# Patient Record
Sex: FEMALE | Race: WHITE | Employment: FULL TIME | URBAN - METROPOLITAN AREA
[De-identification: names, ages, dates, MRNs, and addresses within clinical notes are randomized per-mention and may not be internally consistent; named-entity substitution may affect disease eponyms.]

---

## 2018-07-11 ENCOUNTER — HOSPITAL ENCOUNTER (OUTPATIENT)
Dept: MRI IMAGING | Age: 58
Discharge: HOME | End: 2018-07-11
Attending: INTERNAL MEDICINE
Payer: COMMERCIAL

## 2018-07-11 ENCOUNTER — HOSPITAL ENCOUNTER (OUTPATIENT)
Dept: ULTRASOUND IMAGING | Age: 58
Discharge: HOME | End: 2018-07-11
Attending: INTERNAL MEDICINE
Payer: COMMERCIAL

## 2018-07-11 DIAGNOSIS — M48.00 SPINAL STENOSIS: ICD-10-CM

## 2018-07-11 DIAGNOSIS — R15.9 URINARY AND FECAL INCONTINENCE: ICD-10-CM

## 2018-07-11 DIAGNOSIS — R74.8 ELEVATED LIVER ENZYMES: ICD-10-CM

## 2018-07-11 DIAGNOSIS — R32 URINARY AND FECAL INCONTINENCE: ICD-10-CM

## 2018-07-11 DIAGNOSIS — R79.89 ABNORMAL LIVER FUNCTION TEST: ICD-10-CM

## 2018-07-11 PROCEDURE — 74011250636 HC RX REV CODE- 250/636

## 2018-07-11 PROCEDURE — A9575 INJ GADOTERATE MEGLUMI 0.1ML: HCPCS

## 2018-07-11 PROCEDURE — 76705 ECHO EXAM OF ABDOMEN: CPT

## 2018-07-11 PROCEDURE — 72158 MRI LUMBAR SPINE W/O & W/DYE: CPT

## 2018-07-11 RX ORDER — GADOTERATE MEGLUMINE 376.9 MG/ML
20 INJECTION INTRAVENOUS
Status: COMPLETED | OUTPATIENT
Start: 2018-07-11 | End: 2018-07-11

## 2018-07-11 RX ADMIN — GADOTERATE MEGLUMINE 16 ML: 376.9 INJECTION INTRAVENOUS at 19:45

## 2018-08-03 ENCOUNTER — HOSPITAL ENCOUNTER (OUTPATIENT)
Dept: MAMMOGRAPHY | Age: 58
Discharge: HOME | End: 2018-08-03
Attending: INTERNAL MEDICINE
Payer: COMMERCIAL

## 2018-08-03 DIAGNOSIS — Z12.31 VISIT FOR SCREENING MAMMOGRAM: ICD-10-CM

## 2018-08-03 PROCEDURE — 77063 BREAST TOMOSYNTHESIS BI: CPT

## 2018-09-07 ENCOUNTER — HOSPITAL ENCOUNTER (OUTPATIENT)
Dept: PREADMISSION TESTING | Age: 58
Discharge: HOME | End: 2018-09-07
Payer: COMMERCIAL

## 2018-09-07 VITALS
OXYGEN SATURATION: 98 % | HEART RATE: 80 BPM | BODY MASS INDEX: 26.56 KG/M2 | WEIGHT: 159.4 LBS | SYSTOLIC BLOOD PRESSURE: 124 MMHG | HEIGHT: 65 IN | TEMPERATURE: 98.3 F | RESPIRATION RATE: 16 BRPM | DIASTOLIC BLOOD PRESSURE: 76 MMHG

## 2018-09-07 LAB
ANION GAP SERPL CALC-SCNC: 12 MMOL/L (ref 5–15)
BASOPHILS # BLD: 0.1 K/UL (ref 0–0.1)
BASOPHILS NFR BLD: 1 % (ref 0–1)
BUN SERPL-MCNC: 10 MG/DL (ref 6–20)
BUN/CREAT SERPL: 13 (ref 12–20)
CALCIUM SERPL-MCNC: 8.8 MG/DL (ref 8.5–10.1)
CHLORIDE SERPL-SCNC: 99 MMOL/L (ref 97–108)
CO2 SERPL-SCNC: 30 MMOL/L (ref 21–32)
CREAT SERPL-MCNC: 0.77 MG/DL (ref 0.55–1.02)
DIFFERENTIAL METHOD BLD: ABNORMAL
EOSINOPHIL # BLD: 0.2 K/UL (ref 0–0.4)
EOSINOPHIL NFR BLD: 3 % (ref 0–7)
ERYTHROCYTE [DISTWIDTH] IN BLOOD BY AUTOMATED COUNT: 13.4 % (ref 11.5–14.5)
GLUCOSE SERPL-MCNC: 93 MG/DL (ref 65–100)
HCT VFR BLD AUTO: 40.6 % (ref 35–47)
HGB BLD-MCNC: 13.6 G/DL (ref 11.5–16)
IMM GRANULOCYTES # BLD: 0.1 K/UL (ref 0–0.04)
IMM GRANULOCYTES NFR BLD AUTO: 1 % (ref 0–0.5)
LYMPHOCYTES # BLD: 2.1 K/UL (ref 0.8–3.5)
LYMPHOCYTES NFR BLD: 27 % (ref 12–49)
MCH RBC QN AUTO: 29.8 PG (ref 26–34)
MCHC RBC AUTO-ENTMCNC: 33.5 G/DL (ref 30–36.5)
MCV RBC AUTO: 88.8 FL (ref 80–99)
MONOCYTES # BLD: 0.6 K/UL (ref 0–1)
MONOCYTES NFR BLD: 8 % (ref 5–13)
NEUTS SEG # BLD: 4.9 K/UL (ref 1.8–8)
NEUTS SEG NFR BLD: 61 % (ref 32–75)
NRBC # BLD: 0 K/UL (ref 0–0.01)
NRBC BLD-RTO: 0 PER 100 WBC
PLATELET # BLD AUTO: 354 K/UL (ref 150–400)
PMV BLD AUTO: 9.7 FL (ref 8.9–12.9)
POTASSIUM SERPL-SCNC: 3.7 MMOL/L (ref 3.5–5.1)
RBC # BLD AUTO: 4.57 M/UL (ref 3.8–5.2)
SODIUM SERPL-SCNC: 141 MMOL/L (ref 136–145)
WBC # BLD AUTO: 8 K/UL (ref 3.6–11)

## 2018-09-07 PROCEDURE — 80048 BASIC METABOLIC PNL TOTAL CA: CPT | Performed by: NEUROLOGICAL SURGERY

## 2018-09-07 PROCEDURE — 36415 COLL VENOUS BLD VENIPUNCTURE: CPT | Performed by: NEUROLOGICAL SURGERY

## 2018-09-07 PROCEDURE — 85025 COMPLETE CBC W/AUTO DIFF WBC: CPT | Performed by: NEUROLOGICAL SURGERY

## 2018-09-07 PROCEDURE — 93005 ELECTROCARDIOGRAM TRACING: CPT

## 2018-09-07 RX ORDER — NAPROXEN 500 MG/1
500 TABLET ORAL 2 TIMES DAILY WITH MEALS
COMMUNITY
End: 2018-09-22

## 2018-09-07 RX ORDER — LEVOTHYROXINE SODIUM 50 UG/1
50 TABLET ORAL
COMMUNITY

## 2018-09-07 RX ORDER — TRAMADOL HYDROCHLORIDE 300 MG/1
1 TABLET, FILM COATED, EXTENDED RELEASE ORAL
COMMUNITY
End: 2018-09-22

## 2018-09-07 RX ORDER — VENLAFAXINE HYDROCHLORIDE 150 MG/1
150 TABLET, EXTENDED RELEASE ORAL DAILY
COMMUNITY
End: 2019-05-28

## 2018-09-07 RX ORDER — LOSARTAN POTASSIUM AND HYDROCHLOROTHIAZIDE 25; 100 MG/1; MG/1
1 TABLET ORAL DAILY
COMMUNITY

## 2018-09-07 RX ORDER — ZOLPIDEM TARTRATE 12.5 MG/1
12.5 TABLET, FILM COATED, EXTENDED RELEASE ORAL
COMMUNITY

## 2018-09-07 RX ORDER — POTASSIUM CHLORIDE 750 MG/1
10 TABLET, FILM COATED, EXTENDED RELEASE ORAL
COMMUNITY
End: 2018-09-27 | Stop reason: SDUPTHER

## 2018-09-07 RX ORDER — ZAFIRLUKAST 20 MG/1
20 TABLET, FILM COATED ORAL 2 TIMES DAILY
COMMUNITY

## 2018-09-07 NOTE — PERIOP NOTES
PATIENT GIVEN SURGICAL SITE INFECTION FAQ HANDOUT. PRE-OP INSTRUCTIONS REVIEWED AND PATIENT VERBALIZES UNDERSTANDING OF INSTRUCTIONS. PATIENT HAS BEEN GIVEN THE OPPORTUNITY TO ASK ADDITIONAL QUESTIONS.

## 2018-09-08 LAB
ATRIAL RATE: 72 BPM
BACTERIA SPEC CULT: NORMAL
BACTERIA SPEC CULT: NORMAL
CALCULATED P AXIS, ECG09: 57 DEGREES
CALCULATED R AXIS, ECG10: 52 DEGREES
CALCULATED T AXIS, ECG11: 85 DEGREES
DIAGNOSIS, 93000: NORMAL
P-R INTERVAL, ECG05: 160 MS
Q-T INTERVAL, ECG07: 398 MS
QRS DURATION, ECG06: 92 MS
QTC CALCULATION (BEZET), ECG08: 435 MS
SERVICE CMNT-IMP: NORMAL
VENTRICULAR RATE, ECG03: 72 BPM

## 2018-09-17 ENCOUNTER — ANESTHESIA EVENT (OUTPATIENT)
Dept: SURGERY | Age: 58
DRG: 460 | End: 2018-09-17
Payer: COMMERCIAL

## 2018-09-18 ENCOUNTER — HOSPITAL ENCOUNTER (INPATIENT)
Age: 58
LOS: 4 days | Discharge: HOME HEALTH | DRG: 460 | End: 2018-09-22
Attending: NEUROLOGICAL SURGERY | Admitting: NEUROLOGICAL SURGERY
Payer: COMMERCIAL

## 2018-09-18 ENCOUNTER — ANESTHESIA (OUTPATIENT)
Dept: SURGERY | Age: 58
DRG: 460 | End: 2018-09-18
Payer: COMMERCIAL

## 2018-09-18 ENCOUNTER — APPOINTMENT (OUTPATIENT)
Dept: GENERAL RADIOLOGY | Age: 58
DRG: 460 | End: 2018-09-18
Attending: NEUROLOGICAL SURGERY
Payer: COMMERCIAL

## 2018-09-18 DIAGNOSIS — M48.062 LUMBAR STENOSIS WITH NEUROGENIC CLAUDICATION: Primary | ICD-10-CM

## 2018-09-18 LAB
GLUCOSE BLD STRIP.AUTO-MCNC: 109 MG/DL (ref 65–100)
SERVICE CMNT-IMP: ABNORMAL

## 2018-09-18 PROCEDURE — 77030003029 HC SUT VCRL J&J -B: Performed by: NEUROLOGICAL SURGERY

## 2018-09-18 PROCEDURE — 77030002933 HC SUT MCRYL J&J -A: Performed by: NEUROLOGICAL SURGERY

## 2018-09-18 PROCEDURE — 74011000272 HC RX REV CODE- 272: Performed by: NEUROLOGICAL SURGERY

## 2018-09-18 PROCEDURE — C1713 ANCHOR/SCREW BN/BN,TIS/BN: HCPCS | Performed by: NEUROLOGICAL SURGERY

## 2018-09-18 PROCEDURE — 74011250636 HC RX REV CODE- 250/636

## 2018-09-18 PROCEDURE — 74011250637 HC RX REV CODE- 250/637: Performed by: NEUROLOGICAL SURGERY

## 2018-09-18 PROCEDURE — 77030013079 HC BLNKT BAIR HGGR 3M -A: Performed by: NURSE ANESTHETIST, CERTIFIED REGISTERED

## 2018-09-18 PROCEDURE — 77030019908 HC STETH ESOPH SIMS -A: Performed by: NURSE ANESTHETIST, CERTIFIED REGISTERED

## 2018-09-18 PROCEDURE — 77030008684 HC TU ET CUF COVD -B: Performed by: NURSE ANESTHETIST, CERTIFIED REGISTERED

## 2018-09-18 PROCEDURE — 77030010813: Performed by: NEUROLOGICAL SURGERY

## 2018-09-18 PROCEDURE — 77030006476: Performed by: NEUROLOGICAL SURGERY

## 2018-09-18 PROCEDURE — 77030029099 HC BN WAX SSPC -A: Performed by: NEUROLOGICAL SURGERY

## 2018-09-18 PROCEDURE — 77030030107 HC BLD BN MILL DISP STRY -C: Performed by: NEUROLOGICAL SURGERY

## 2018-09-18 PROCEDURE — 65270000029 HC RM PRIVATE

## 2018-09-18 PROCEDURE — 74011250636 HC RX REV CODE- 250/636: Performed by: ANESTHESIOLOGY

## 2018-09-18 PROCEDURE — 74011000250 HC RX REV CODE- 250: Performed by: NEUROLOGICAL SURGERY

## 2018-09-18 PROCEDURE — 77030032490 HC SLV COMPR SCD KNE COVD -B: Performed by: NEUROLOGICAL SURGERY

## 2018-09-18 PROCEDURE — 77030020782 HC GWN BAIR PAWS FLX 3M -B

## 2018-09-18 PROCEDURE — 74011250636 HC RX REV CODE- 250/636: Performed by: NEUROLOGICAL SURGERY

## 2018-09-18 PROCEDURE — 76210000000 HC OR PH I REC 2 TO 2.5 HR: Performed by: NEUROLOGICAL SURGERY

## 2018-09-18 PROCEDURE — 77030034850: Performed by: NEUROLOGICAL SURGERY

## 2018-09-18 PROCEDURE — 76010000178 HC OR TIME 5.5 TO 6 HR INTENSV-TIER 1: Performed by: NEUROLOGICAL SURGERY

## 2018-09-18 PROCEDURE — 76060000042 HC ANESTHESIA 5.5 TO 6 HR: Performed by: NEUROLOGICAL SURGERY

## 2018-09-18 PROCEDURE — 0SG00AJ FUSION OF LUMBAR VERTEBRAL JOINT WITH INTERBODY FUSION DEVICE, POSTERIOR APPROACH, ANTERIOR COLUMN, OPEN APPROACH: ICD-10-PCS | Performed by: NEUROLOGICAL SURGERY

## 2018-09-18 PROCEDURE — 76001 XR FLUOROSCOPY OVER 60 MINUTES: CPT

## 2018-09-18 PROCEDURE — 77030012035 HC APPL SEAL MICROMYST KT INLC -C: Performed by: NEUROLOGICAL SURGERY

## 2018-09-18 PROCEDURE — 74011000250 HC RX REV CODE- 250

## 2018-09-18 PROCEDURE — 77030004391 HC BUR FLUT MEDT -C: Performed by: NEUROLOGICAL SURGERY

## 2018-09-18 PROCEDURE — 0SB20ZZ EXCISION OF LUMBAR VERTEBRAL DISC, OPEN APPROACH: ICD-10-PCS | Performed by: NEUROLOGICAL SURGERY

## 2018-09-18 PROCEDURE — 77030013951 HC SEAL TISS ADH DURASL KT INLC -G1: Performed by: NEUROLOGICAL SURGERY

## 2018-09-18 PROCEDURE — 77030012406 HC DRN WND PENRS BARD -A: Performed by: NEUROLOGICAL SURGERY

## 2018-09-18 PROCEDURE — 77030002946 HC SUT NRLN J&J -B: Performed by: NEUROLOGICAL SURGERY

## 2018-09-18 PROCEDURE — 82962 GLUCOSE BLOOD TEST: CPT

## 2018-09-18 PROCEDURE — 77030012890

## 2018-09-18 PROCEDURE — 77030003152 HC GRFT COLGN DURAL INLC -H: Performed by: NEUROLOGICAL SURGERY

## 2018-09-18 PROCEDURE — 77030038600 HC TU BPLR IRR DISP STRY -B: Performed by: NEUROLOGICAL SURGERY

## 2018-09-18 PROCEDURE — 77030026438 HC STYL ET INTUB CARD -A: Performed by: NURSE ANESTHETIST, CERTIFIED REGISTERED

## 2018-09-18 PROCEDURE — 77030014647 HC SEAL FBRN TISSL BAXT -D: Performed by: NEUROLOGICAL SURGERY

## 2018-09-18 DEVICE — IMPLANTABLE DEVICE: Type: IMPLANTABLE DEVICE | Site: SPINE LUMBAR | Status: FUNCTIONAL

## 2018-09-18 DEVICE — DURAGEN® PLUS DURAL REGENERATION MATRIX, 2 IN X 2 IN (5 CM X 5 CM)
Type: IMPLANTABLE DEVICE | Site: SPINE LUMBAR | Status: FUNCTIONAL
Brand: DURAGEN® PLUS

## 2018-09-18 DEVICE — ENDCAP SPNL PEDCL THORLUM TI LOK FOR 3D HD CLICK: Type: IMPLANTABLE DEVICE | Site: SPINE LUMBAR | Status: FUNCTIONAL

## 2018-09-18 RX ORDER — PROPOFOL 10 MG/ML
INJECTION, EMULSION INTRAVENOUS AS NEEDED
Status: DISCONTINUED | OUTPATIENT
Start: 2018-09-18 | End: 2018-09-18 | Stop reason: HOSPADM

## 2018-09-18 RX ORDER — ATORVASTATIN CALCIUM 40 MG/1
40 TABLET, FILM COATED ORAL
Status: DISCONTINUED | OUTPATIENT
Start: 2018-09-18 | End: 2018-09-22 | Stop reason: HOSPADM

## 2018-09-18 RX ORDER — MIDAZOLAM HYDROCHLORIDE 1 MG/ML
1 INJECTION, SOLUTION INTRAMUSCULAR; INTRAVENOUS AS NEEDED
Status: DISCONTINUED | OUTPATIENT
Start: 2018-09-18 | End: 2018-09-18 | Stop reason: HOSPADM

## 2018-09-18 RX ORDER — ONDANSETRON 2 MG/ML
INJECTION INTRAMUSCULAR; INTRAVENOUS AS NEEDED
Status: DISCONTINUED | OUTPATIENT
Start: 2018-09-18 | End: 2018-09-18 | Stop reason: HOSPADM

## 2018-09-18 RX ORDER — HYDROMORPHONE HYDROCHLORIDE 2 MG/ML
0.2 INJECTION, SOLUTION INTRAMUSCULAR; INTRAVENOUS; SUBCUTANEOUS
Status: DISCONTINUED | OUTPATIENT
Start: 2018-09-18 | End: 2018-09-18 | Stop reason: HOSPADM

## 2018-09-18 RX ORDER — DIPHENHYDRAMINE HCL 25 MG
25 CAPSULE ORAL
Status: DISCONTINUED | OUTPATIENT
Start: 2018-09-18 | End: 2018-09-22 | Stop reason: HOSPADM

## 2018-09-18 RX ORDER — HYDROMORPHONE HYDROCHLORIDE 2 MG/ML
INJECTION, SOLUTION INTRAMUSCULAR; INTRAVENOUS; SUBCUTANEOUS
Status: COMPLETED
Start: 2018-09-18 | End: 2018-09-18

## 2018-09-18 RX ORDER — SODIUM CHLORIDE 0.9 % (FLUSH) 0.9 %
5-10 SYRINGE (ML) INJECTION AS NEEDED
Status: DISCONTINUED | OUTPATIENT
Start: 2018-09-18 | End: 2018-09-18 | Stop reason: HOSPADM

## 2018-09-18 RX ORDER — ONDANSETRON 2 MG/ML
4 INJECTION INTRAMUSCULAR; INTRAVENOUS
Status: DISPENSED | OUTPATIENT
Start: 2018-09-18 | End: 2018-09-19

## 2018-09-18 RX ORDER — OXYCODONE HYDROCHLORIDE 5 MG/1
5-10 TABLET ORAL
Status: DISCONTINUED | OUTPATIENT
Start: 2018-09-18 | End: 2018-09-20

## 2018-09-18 RX ORDER — DEXAMETHASONE SODIUM PHOSPHATE 4 MG/ML
INJECTION, SOLUTION INTRA-ARTICULAR; INTRALESIONAL; INTRAMUSCULAR; INTRAVENOUS; SOFT TISSUE AS NEEDED
Status: DISCONTINUED | OUTPATIENT
Start: 2018-09-18 | End: 2018-09-18 | Stop reason: HOSPADM

## 2018-09-18 RX ORDER — SODIUM CHLORIDE, SODIUM LACTATE, POTASSIUM CHLORIDE, CALCIUM CHLORIDE 600; 310; 30; 20 MG/100ML; MG/100ML; MG/100ML; MG/100ML
100 INJECTION, SOLUTION INTRAVENOUS CONTINUOUS
Status: DISCONTINUED | OUTPATIENT
Start: 2018-09-18 | End: 2018-09-18 | Stop reason: HOSPADM

## 2018-09-18 RX ORDER — CEFAZOLIN SODIUM/WATER 2 G/20 ML
2 SYRINGE (ML) INTRAVENOUS EVERY 8 HOURS
Status: COMPLETED | OUTPATIENT
Start: 2018-09-18 | End: 2018-09-19

## 2018-09-18 RX ORDER — DIAZEPAM 10 MG/2ML
INJECTION INTRAMUSCULAR
Status: COMPLETED
Start: 2018-09-18 | End: 2018-09-18

## 2018-09-18 RX ORDER — PANTOPRAZOLE SODIUM 40 MG/1
40 TABLET, DELAYED RELEASE ORAL DAILY
Status: DISCONTINUED | OUTPATIENT
Start: 2018-09-19 | End: 2018-09-22 | Stop reason: HOSPADM

## 2018-09-18 RX ORDER — HYDROMORPHONE HYDROCHLORIDE 2 MG/ML
0.2 INJECTION, SOLUTION INTRAMUSCULAR; INTRAVENOUS; SUBCUTANEOUS
Status: COMPLETED | OUTPATIENT
Start: 2018-09-18 | End: 2018-09-18

## 2018-09-18 RX ORDER — ESTRADIOL 1 MG/1
0.5 TABLET ORAL DAILY
Status: DISCONTINUED | OUTPATIENT
Start: 2018-09-19 | End: 2018-09-22 | Stop reason: HOSPADM

## 2018-09-18 RX ORDER — LEVOTHYROXINE SODIUM 50 UG/1
50 TABLET ORAL
Status: DISCONTINUED | OUTPATIENT
Start: 2018-09-23 | End: 2018-09-22 | Stop reason: HOSPADM

## 2018-09-18 RX ORDER — MONTELUKAST SODIUM 10 MG/1
10 TABLET ORAL
Status: DISCONTINUED | OUTPATIENT
Start: 2018-09-18 | End: 2018-09-22 | Stop reason: HOSPADM

## 2018-09-18 RX ORDER — SODIUM CHLORIDE, SODIUM LACTATE, POTASSIUM CHLORIDE, CALCIUM CHLORIDE 600; 310; 30; 20 MG/100ML; MG/100ML; MG/100ML; MG/100ML
100 INJECTION, SOLUTION INTRAVENOUS CONTINUOUS
Status: DISPENSED | OUTPATIENT
Start: 2018-09-18 | End: 2018-09-19

## 2018-09-18 RX ORDER — NALOXONE HYDROCHLORIDE 0.4 MG/ML
0.4 INJECTION, SOLUTION INTRAMUSCULAR; INTRAVENOUS; SUBCUTANEOUS AS NEEDED
Status: DISCONTINUED | OUTPATIENT
Start: 2018-09-18 | End: 2018-09-22 | Stop reason: HOSPADM

## 2018-09-18 RX ORDER — ACETAMINOPHEN 325 MG/1
650 TABLET ORAL EVERY 6 HOURS
Status: DISCONTINUED | OUTPATIENT
Start: 2018-09-18 | End: 2018-09-22 | Stop reason: HOSPADM

## 2018-09-18 RX ORDER — DIAZEPAM 10 MG/2ML
5 INJECTION INTRAMUSCULAR ONCE
Status: COMPLETED | OUTPATIENT
Start: 2018-09-18 | End: 2018-09-18

## 2018-09-18 RX ORDER — SODIUM CHLORIDE 0.9 % (FLUSH) 0.9 %
5-10 SYRINGE (ML) INJECTION AS NEEDED
Status: DISCONTINUED | OUTPATIENT
Start: 2018-09-18 | End: 2018-09-22 | Stop reason: HOSPADM

## 2018-09-18 RX ORDER — MELATONIN
5000 DAILY
Status: DISCONTINUED | OUTPATIENT
Start: 2018-09-19 | End: 2018-09-22 | Stop reason: HOSPADM

## 2018-09-18 RX ORDER — FENTANYL CITRATE 50 UG/ML
INJECTION, SOLUTION INTRAMUSCULAR; INTRAVENOUS AS NEEDED
Status: DISCONTINUED | OUTPATIENT
Start: 2018-09-18 | End: 2018-09-18 | Stop reason: HOSPADM

## 2018-09-18 RX ORDER — DEXMEDETOMIDINE HYDROCHLORIDE 4 UG/ML
INJECTION, SOLUTION INTRAVENOUS AS NEEDED
Status: DISCONTINUED | OUTPATIENT
Start: 2018-09-18 | End: 2018-09-18 | Stop reason: HOSPADM

## 2018-09-18 RX ORDER — FENTANYL CITRATE 50 UG/ML
50 INJECTION, SOLUTION INTRAMUSCULAR; INTRAVENOUS AS NEEDED
Status: DISCONTINUED | OUTPATIENT
Start: 2018-09-18 | End: 2018-09-18 | Stop reason: HOSPADM

## 2018-09-18 RX ORDER — SODIUM CHLORIDE 0.9 % (FLUSH) 0.9 %
5-10 SYRINGE (ML) INJECTION EVERY 8 HOURS
Status: DISCONTINUED | OUTPATIENT
Start: 2018-09-18 | End: 2018-09-18 | Stop reason: HOSPADM

## 2018-09-18 RX ORDER — BISACODYL 5 MG
5 TABLET, DELAYED RELEASE (ENTERIC COATED) ORAL DAILY PRN
Status: DISCONTINUED | OUTPATIENT
Start: 2018-09-18 | End: 2018-09-22 | Stop reason: HOSPADM

## 2018-09-18 RX ORDER — DIPHENHYDRAMINE HYDROCHLORIDE 50 MG/ML
12.5 INJECTION, SOLUTION INTRAMUSCULAR; INTRAVENOUS AS NEEDED
Status: DISCONTINUED | OUTPATIENT
Start: 2018-09-18 | End: 2018-09-18 | Stop reason: HOSPADM

## 2018-09-18 RX ORDER — CEFAZOLIN SODIUM/WATER 2 G/20 ML
2 SYRINGE (ML) INTRAVENOUS
Status: COMPLETED | OUTPATIENT
Start: 2018-09-18 | End: 2018-09-18

## 2018-09-18 RX ORDER — DIAZEPAM 5 MG/1
5 TABLET ORAL
Status: DISCONTINUED | OUTPATIENT
Start: 2018-09-18 | End: 2018-09-22 | Stop reason: HOSPADM

## 2018-09-18 RX ORDER — ZOLPIDEM TARTRATE 10 MG/1
10 TABLET ORAL
Status: DISCONTINUED | OUTPATIENT
Start: 2018-09-18 | End: 2018-09-22 | Stop reason: HOSPADM

## 2018-09-18 RX ORDER — SODIUM CHLORIDE 0.9 % (FLUSH) 0.9 %
5-10 SYRINGE (ML) INJECTION EVERY 8 HOURS
Status: DISCONTINUED | OUTPATIENT
Start: 2018-09-19 | End: 2018-09-22 | Stop reason: HOSPADM

## 2018-09-18 RX ORDER — VENLAFAXINE HYDROCHLORIDE 150 MG/1
150 CAPSULE, EXTENDED RELEASE ORAL
Status: DISCONTINUED | OUTPATIENT
Start: 2018-09-19 | End: 2018-09-22 | Stop reason: HOSPADM

## 2018-09-18 RX ORDER — GLYCOPYRROLATE 0.2 MG/ML
INJECTION INTRAMUSCULAR; INTRAVENOUS AS NEEDED
Status: DISCONTINUED | OUTPATIENT
Start: 2018-09-18 | End: 2018-09-18 | Stop reason: HOSPADM

## 2018-09-18 RX ORDER — FENTANYL CITRATE 50 UG/ML
25 INJECTION, SOLUTION INTRAMUSCULAR; INTRAVENOUS
Status: DISCONTINUED | OUTPATIENT
Start: 2018-09-18 | End: 2018-09-18 | Stop reason: HOSPADM

## 2018-09-18 RX ORDER — MORPHINE SULFATE 4 MG/ML
INJECTION, SOLUTION INTRAMUSCULAR; INTRAVENOUS AS NEEDED
Status: DISCONTINUED | OUTPATIENT
Start: 2018-09-18 | End: 2018-09-18 | Stop reason: HOSPADM

## 2018-09-18 RX ORDER — SODIUM CHLORIDE, SODIUM LACTATE, POTASSIUM CHLORIDE, CALCIUM CHLORIDE 600; 310; 30; 20 MG/100ML; MG/100ML; MG/100ML; MG/100ML
INJECTION, SOLUTION INTRAVENOUS
Status: DISCONTINUED | OUTPATIENT
Start: 2018-09-18 | End: 2018-09-18 | Stop reason: HOSPADM

## 2018-09-18 RX ORDER — MIDAZOLAM HYDROCHLORIDE 1 MG/ML
INJECTION, SOLUTION INTRAMUSCULAR; INTRAVENOUS AS NEEDED
Status: DISCONTINUED | OUTPATIENT
Start: 2018-09-18 | End: 2018-09-18 | Stop reason: HOSPADM

## 2018-09-18 RX ORDER — BUPIVACAINE HYDROCHLORIDE AND EPINEPHRINE 5; 5 MG/ML; UG/ML
INJECTION, SOLUTION EPIDURAL; INTRACAUDAL; PERINEURAL AS NEEDED
Status: DISCONTINUED | OUTPATIENT
Start: 2018-09-18 | End: 2018-09-18 | Stop reason: HOSPADM

## 2018-09-18 RX ORDER — GABAPENTIN 300 MG/1
300 CAPSULE ORAL 2 TIMES DAILY
Status: DISCONTINUED | OUTPATIENT
Start: 2018-09-18 | End: 2018-09-22 | Stop reason: HOSPADM

## 2018-09-18 RX ORDER — HYDROMORPHONE HYDROCHLORIDE 4 MG/1
4 TABLET ORAL
Status: DISCONTINUED | OUTPATIENT
Start: 2018-09-18 | End: 2018-09-19

## 2018-09-18 RX ORDER — PHENYLEPHRINE HCL IN 0.9% NACL 0.4MG/10ML
SYRINGE (ML) INTRAVENOUS AS NEEDED
Status: DISCONTINUED | OUTPATIENT
Start: 2018-09-18 | End: 2018-09-18 | Stop reason: HOSPADM

## 2018-09-18 RX ORDER — KETAMINE HYDROCHLORIDE 10 MG/ML
INJECTION, SOLUTION INTRAMUSCULAR; INTRAVENOUS AS NEEDED
Status: DISCONTINUED | OUTPATIENT
Start: 2018-09-18 | End: 2018-09-18 | Stop reason: HOSPADM

## 2018-09-18 RX ORDER — MIDAZOLAM HYDROCHLORIDE 1 MG/ML
0.5 INJECTION, SOLUTION INTRAMUSCULAR; INTRAVENOUS
Status: DISCONTINUED | OUTPATIENT
Start: 2018-09-18 | End: 2018-09-18 | Stop reason: HOSPADM

## 2018-09-18 RX ORDER — SUCCINYLCHOLINE CHLORIDE 20 MG/ML
INJECTION INTRAMUSCULAR; INTRAVENOUS AS NEEDED
Status: DISCONTINUED | OUTPATIENT
Start: 2018-09-18 | End: 2018-09-18 | Stop reason: HOSPADM

## 2018-09-18 RX ORDER — ROCURONIUM BROMIDE 10 MG/ML
INJECTION, SOLUTION INTRAVENOUS AS NEEDED
Status: DISCONTINUED | OUTPATIENT
Start: 2018-09-18 | End: 2018-09-18 | Stop reason: HOSPADM

## 2018-09-18 RX ORDER — DOCUSATE SODIUM 100 MG/1
100 CAPSULE, LIQUID FILLED ORAL 2 TIMES DAILY
Status: DISCONTINUED | OUTPATIENT
Start: 2018-09-18 | End: 2018-09-22 | Stop reason: HOSPADM

## 2018-09-18 RX ORDER — POTASSIUM CHLORIDE 750 MG/1
20 TABLET, FILM COATED, EXTENDED RELEASE ORAL DAILY
Status: DISCONTINUED | OUTPATIENT
Start: 2018-09-19 | End: 2018-09-22 | Stop reason: HOSPADM

## 2018-09-18 RX ORDER — NEOSTIGMINE METHYLSULFATE 1 MG/ML
INJECTION INTRAVENOUS AS NEEDED
Status: DISCONTINUED | OUTPATIENT
Start: 2018-09-18 | End: 2018-09-18 | Stop reason: HOSPADM

## 2018-09-18 RX ORDER — CYCLOBENZAPRINE HCL 10 MG
10 TABLET ORAL
Status: DISCONTINUED | OUTPATIENT
Start: 2018-09-18 | End: 2018-09-22 | Stop reason: HOSPADM

## 2018-09-18 RX ORDER — LEVOTHYROXINE SODIUM 100 UG/1
100 TABLET ORAL
Status: DISCONTINUED | OUTPATIENT
Start: 2018-09-19 | End: 2018-09-22 | Stop reason: HOSPADM

## 2018-09-18 RX ADMIN — Medication 80 MCG: at 11:09

## 2018-09-18 RX ADMIN — FENTANYL CITRATE 25 MCG: 50 INJECTION, SOLUTION INTRAMUSCULAR; INTRAVENOUS at 14:05

## 2018-09-18 RX ADMIN — HYDROMORPHONE HYDROCHLORIDE 0.2 MG: 2 INJECTION, SOLUTION INTRAMUSCULAR; INTRAVENOUS; SUBCUTANEOUS at 14:15

## 2018-09-18 RX ADMIN — ROCURONIUM BROMIDE 30 MG: 10 INJECTION, SOLUTION INTRAVENOUS at 08:38

## 2018-09-18 RX ADMIN — DIAZEPAM 5 MG: 10 INJECTION INTRAMUSCULAR at 15:00

## 2018-09-18 RX ADMIN — Medication 40 MCG: at 10:20

## 2018-09-18 RX ADMIN — HYDROMORPHONE HYDROCHLORIDE 0.2 MG: 2 INJECTION INTRAMUSCULAR; INTRAVENOUS; SUBCUTANEOUS at 14:15

## 2018-09-18 RX ADMIN — HYDROMORPHONE HYDROCHLORIDE 0.2 MG: 2 INJECTION, SOLUTION INTRAMUSCULAR; INTRAVENOUS; SUBCUTANEOUS at 14:30

## 2018-09-18 RX ADMIN — Medication 2 G: at 12:40

## 2018-09-18 RX ADMIN — Medication 80 MCG: at 09:15

## 2018-09-18 RX ADMIN — ROCURONIUM BROMIDE 20 MG: 10 INJECTION, SOLUTION INTRAVENOUS at 08:06

## 2018-09-18 RX ADMIN — Medication 40 MCG: at 08:56

## 2018-09-18 RX ADMIN — DEXMEDETOMIDINE HYDROCHLORIDE 6 MCG: 4 INJECTION, SOLUTION INTRAVENOUS at 12:39

## 2018-09-18 RX ADMIN — HYDROMORPHONE HYDROCHLORIDE 0.2 MG: 2 INJECTION, SOLUTION INTRAMUSCULAR; INTRAVENOUS; SUBCUTANEOUS at 14:45

## 2018-09-18 RX ADMIN — SODIUM CHLORIDE, SODIUM LACTATE, POTASSIUM CHLORIDE, CALCIUM CHLORIDE: 600; 310; 30; 20 INJECTION, SOLUTION INTRAVENOUS at 10:07

## 2018-09-18 RX ADMIN — MIDAZOLAM HYDROCHLORIDE 2 MG: 1 INJECTION, SOLUTION INTRAMUSCULAR; INTRAVENOUS at 07:56

## 2018-09-18 RX ADMIN — PROPOFOL 150 MG: 10 INJECTION, EMULSION INTRAVENOUS at 08:06

## 2018-09-18 RX ADMIN — FENTANYL CITRATE 100 MCG: 50 INJECTION, SOLUTION INTRAMUSCULAR; INTRAVENOUS at 08:06

## 2018-09-18 RX ADMIN — Medication 40 MCG: at 08:54

## 2018-09-18 RX ADMIN — GABAPENTIN 300 MG: 300 CAPSULE ORAL at 20:10

## 2018-09-18 RX ADMIN — Medication 80 MCG: at 12:56

## 2018-09-18 RX ADMIN — CYCLOBENZAPRINE HYDROCHLORIDE 10 MG: 10 TABLET, FILM COATED ORAL at 16:56

## 2018-09-18 RX ADMIN — DEXMEDETOMIDINE HYDROCHLORIDE 6 MCG: 4 INJECTION, SOLUTION INTRAVENOUS at 12:43

## 2018-09-18 RX ADMIN — ACETAMINOPHEN 650 MG: 325 TABLET ORAL at 16:56

## 2018-09-18 RX ADMIN — MORPHINE SULFATE 4 MG: 4 INJECTION, SOLUTION INTRAMUSCULAR; INTRAVENOUS at 13:11

## 2018-09-18 RX ADMIN — SUCCINYLCHOLINE CHLORIDE 140 MG: 20 INJECTION INTRAMUSCULAR; INTRAVENOUS at 08:06

## 2018-09-18 RX ADMIN — DIAZEPAM 5 MG: 5 INJECTION, SOLUTION INTRAMUSCULAR; INTRAVENOUS at 15:00

## 2018-09-18 RX ADMIN — DEXAMETHASONE SODIUM PHOSPHATE 8 MG: 4 INJECTION, SOLUTION INTRA-ARTICULAR; INTRALESIONAL; INTRAMUSCULAR; INTRAVENOUS; SOFT TISSUE at 08:06

## 2018-09-18 RX ADMIN — FENTANYL CITRATE 25 MCG: 50 INJECTION, SOLUTION INTRAMUSCULAR; INTRAVENOUS at 14:00

## 2018-09-18 RX ADMIN — HYDROMORPHONE HYDROCHLORIDE 0.2 MG: 2 INJECTION, SOLUTION INTRAMUSCULAR; INTRAVENOUS; SUBCUTANEOUS at 15:30

## 2018-09-18 RX ADMIN — SODIUM CHLORIDE, SODIUM LACTATE, POTASSIUM CHLORIDE, AND CALCIUM CHLORIDE 100 ML/HR: 600; 310; 30; 20 INJECTION, SOLUTION INTRAVENOUS at 07:53

## 2018-09-18 RX ADMIN — KETAMINE HYDROCHLORIDE 30 MG: 10 INJECTION, SOLUTION INTRAMUSCULAR; INTRAVENOUS at 08:40

## 2018-09-18 RX ADMIN — ATORVASTATIN CALCIUM 40 MG: 40 TABLET, FILM COATED ORAL at 20:10

## 2018-09-18 RX ADMIN — DEXMEDETOMIDINE HYDROCHLORIDE 8 MCG: 4 INJECTION, SOLUTION INTRAVENOUS at 12:45

## 2018-09-18 RX ADMIN — ROCURONIUM BROMIDE 10 MG: 10 INJECTION, SOLUTION INTRAVENOUS at 10:29

## 2018-09-18 RX ADMIN — FENTANYL CITRATE 150 MCG: 50 INJECTION, SOLUTION INTRAMUSCULAR; INTRAVENOUS at 08:45

## 2018-09-18 RX ADMIN — HYDROMORPHONE HYDROCHLORIDE 4 MG: 4 TABLET ORAL at 18:08

## 2018-09-18 RX ADMIN — HYDROMORPHONE HYDROCHLORIDE 0.2 MG: 2 INJECTION, SOLUTION INTRAMUSCULAR; INTRAVENOUS; SUBCUTANEOUS at 15:15

## 2018-09-18 RX ADMIN — PROPOFOL 50 MG: 10 INJECTION, EMULSION INTRAVENOUS at 13:09

## 2018-09-18 RX ADMIN — DOCUSATE SODIUM 100 MG: 100 CAPSULE, LIQUID FILLED ORAL at 16:56

## 2018-09-18 RX ADMIN — ROCURONIUM BROMIDE 10 MG: 10 INJECTION, SOLUTION INTRAVENOUS at 11:42

## 2018-09-18 RX ADMIN — Medication 40 MCG: at 10:19

## 2018-09-18 RX ADMIN — Medication 40 MCG: at 11:07

## 2018-09-18 RX ADMIN — Medication 2 G: at 08:11

## 2018-09-18 RX ADMIN — SODIUM CHLORIDE, SODIUM LACTATE, POTASSIUM CHLORIDE, CALCIUM CHLORIDE: 600; 310; 30; 20 INJECTION, SOLUTION INTRAVENOUS at 12:18

## 2018-09-18 RX ADMIN — Medication 2 G: at 20:22

## 2018-09-18 RX ADMIN — Medication 80 MCG: at 09:01

## 2018-09-18 RX ADMIN — ROCURONIUM BROMIDE 10 MG: 10 INJECTION, SOLUTION INTRAVENOUS at 09:43

## 2018-09-18 RX ADMIN — KETAMINE HYDROCHLORIDE 20 MG: 10 INJECTION, SOLUTION INTRAMUSCULAR; INTRAVENOUS at 08:17

## 2018-09-18 RX ADMIN — Medication 40 MCG: at 09:27

## 2018-09-18 RX ADMIN — ZOLPIDEM TARTRATE 10 MG: 10 TABLET ORAL at 20:11

## 2018-09-18 RX ADMIN — GLYCOPYRROLATE 0.4 MG: 0.2 INJECTION INTRAMUSCULAR; INTRAVENOUS at 13:17

## 2018-09-18 RX ADMIN — FENTANYL CITRATE 25 MCG: 50 INJECTION, SOLUTION INTRAMUSCULAR; INTRAVENOUS at 13:55

## 2018-09-18 RX ADMIN — MONTELUKAST SODIUM 10 MG: 10 TABLET, FILM COATED ORAL at 20:10

## 2018-09-18 RX ADMIN — FENTANYL CITRATE 25 MCG: 50 INJECTION, SOLUTION INTRAMUSCULAR; INTRAVENOUS at 14:10

## 2018-09-18 RX ADMIN — Medication 80 MCG: at 09:08

## 2018-09-18 RX ADMIN — ROCURONIUM BROMIDE 10 MG: 10 INJECTION, SOLUTION INTRAVENOUS at 10:57

## 2018-09-18 RX ADMIN — NEOSTIGMINE METHYLSULFATE 3 MG: 1 INJECTION INTRAVENOUS at 13:17

## 2018-09-18 RX ADMIN — SODIUM CHLORIDE, POTASSIUM CHLORIDE, SODIUM LACTATE AND CALCIUM CHLORIDE 100 ML/HR: 600; 310; 30; 20 INJECTION, SOLUTION INTRAVENOUS at 14:00

## 2018-09-18 RX ADMIN — ONDANSETRON 4 MG: 2 INJECTION INTRAMUSCULAR; INTRAVENOUS at 12:38

## 2018-09-18 RX ADMIN — ROCURONIUM BROMIDE 10 MG: 10 INJECTION, SOLUTION INTRAVENOUS at 12:10

## 2018-09-18 RX ADMIN — HYDROMORPHONE HYDROCHLORIDE 4 MG: 4 TABLET ORAL at 21:46

## 2018-09-18 RX ADMIN — HYDROMORPHONE HYDROCHLORIDE 0.2 MG: 2 INJECTION, SOLUTION INTRAMUSCULAR; INTRAVENOUS; SUBCUTANEOUS at 15:00

## 2018-09-18 NOTE — PROGRESS NOTES
Problem: Falls - Risk of 
Goal: *Absence of Falls Document Kevin Schneider Fall Risk and appropriate interventions in the flowsheet. Outcome: Progressing Towards Goal 
Fall Risk Interventions: 
Mobility Interventions: Patient to call before getting OOB Medication Interventions: Patient to call before getting OOB Elimination Interventions: Call light in reach, Patient to call for help with toileting needs, Toileting schedule/hourly rounds

## 2018-09-18 NOTE — ROUTINE PROCESS
Patient: Jason Garner MRN: [id number]  SSN: [id number] YOB: 1960  Age: 62 y.o. Sex: female Patient is status post Procedure(s): 
L3-4 FUSION WITH AUTOGRAFT  . Surgeon(s) and Role: Paige Paz MD - Primary Local/Dose/Irrigation:  See intraop meds Peripheral IV 09/18/18 Arm (Active) Airway - Endotracheal Tube 09/18/18 Oral (Active) Dressing/Packing:    
Splint/Cast:  ] Other:

## 2018-09-18 NOTE — PROGRESS NOTES
Primary Nurse Dco Blevins RN and Collins RN performed a dual skin assessment on this patient No impairment noted Navdeep score is 21 S/p Lumber fusion

## 2018-09-18 NOTE — ANESTHESIA PREPROCEDURE EVALUATION
Anesthetic History No history of anesthetic complications Review of Systems / Medical History Patient summary reviewed, nursing notes reviewed and pertinent labs reviewed Pulmonary Sleep apnea Asthma Neuro/Psych Within defined limits Cardiovascular Hypertension GI/Hepatic/Renal 
  
GERD 
 
 
PUD Endo/Other Hypothyroidism Arthritis Other Findings Physical Exam 
 
Airway Mallampati: II 
TM Distance: > 6 cm Neck ROM: normal range of motion Mouth opening: Normal 
 
 Cardiovascular Regular rate and rhythm,  S1 and S2 normal,  no murmur, click, rub, or gallop Dental 
No notable dental hx Pulmonary Breath sounds clear to auscultation Abdominal 
GI exam deferred Other Findings Anesthetic Plan ASA: 3 Anesthesia type: general 
 
 
 
 
Induction: Intravenous Anesthetic plan and risks discussed with: Patient

## 2018-09-18 NOTE — BRIEF OP NOTE
BRIEF OPERATIVE NOTE Date of Procedure: 9/18/2018 Preoperative Diagnosis: LUMBAR STENOSIS 3/4, SPONDYLOLISTHESIS L3/4, PREVIOUS L4-S1 FUSION Postoperative Diagnosis:SAME Procedure(s): 
L3-4 FUSION WITH INTERBODY CAGE, ALLOGRAFT AND AUTOGRAFT; PEDICLE SCREW PLACEMENT AT L3 BILATERAL, EXPLORATION OF PREVIOUS FUSION L4-S1, REMOVAL OF RODS REPLACEMENT WITH NEW RODS  FROM L3-S1, INCIDENTAL DUROTOMY Surgeon(s) and Role: Taylor James MD - Primary Surgical Assistant: Eve Davalos Surgical Staff: 
Circ-1: Adelso Obando RN 
Circ-Relief: Marylen Lewis, RN Radiology Technician: Elton Redman RT; Giovana Chang RT, R Scrub Tech-1: Hosea Shields RN-Relief: Marylen Lewis, RN Surg Asst-1: Nani Germain Event Time In Incision Start 3750 Incision Close 1332 Anesthesia: General  
Estimated Blood Loss: 150cc Specimens: * No specimens in log * Findings: L3/4 instability and stenosis, scarring at caudal aspect of L3/4, good fusion L4-S1 Complications: incidental durotomy, repaired primarily without difficulty Implants:  
Implant Name Type Inv. Item Serial No.  Lot No. LRB No. Used Action  
progenix Graft  [id number]  [id number] N/A 1 Implanted CAP LCK CLICKX F/1-Q HD TI --  - SN/A  CAP LCK CLICKX Z/5-B HD TI --  N/A SYNTHES [place]  N/A 8 Implanted SCR SPNE HD CLICKX3-D 3MM TI --  - SN/A  SCR SPNE HD CLICKX3-D 3MM TI --  N/A SYNTHES [place]  N/A 2 Implanted ERNESTINE SPNE SFT UNIV SPNE [id number] --  - SN/A  ERNESTINE SPNE SFT UNIV SPNE [id number] --  N/A SYNTHES [place]  N/A 2 Implanted  
screw   N/A DEPUY SPINE  N/A 1 Implanted SCR PEDCL 2COR CLICKX [id number] --  - SN/A  SCR PEDCL 2COR CLICKX [id number] --  N/A SYNTHES [place]  N/A 1 Implanted  
duragen     N/A   [id number] N/A 1 Implanted

## 2018-09-18 NOTE — PERIOP NOTES
TRANSFER - OUT REPORT: 
 
Verbal report given to Jasmin on Melony Krabbe  being transferred to [place] [address] for routine progression of care Report consisted of patients Situation, Background, Assessment and  
Recommendations(SBAR). Time Pre op antibiotic [id number], 3675 Anesthesia Stop time: 9822 [id number] Rader Present on Transfer to floor:y Order for Rader on Chart:y 
Discharge Prescriptions with Chart:n Information from the following report(s) SBAR, Kardex, OR Summary, Procedure Summary, Intake/Output, MAR, Recent Results and Med Rec Status was reviewed with the receiving nurse. Opportunity for questions and clarification was provided. Is the patient on 02? YES 
     L/Min 2 Other Is the patient on a monitor? NO Is the nurse transporting with the patient? NO Surgical Waiting Area notified of patient's transfer from PACU? YES The following personal items collected during your admission accompanied patient upon transfer:  
Dental Appliance:   
Vision:   
Hearing Aid:   
Jewelry:   
Clothing: Clothing: Other (comment) (clothing bag to pacu) Other Valuables: Other Valuables: Eyeglasses (glasses to pacu) Valuables sent to safe:

## 2018-09-18 NOTE — OP NOTES
[address]   OPERATIVE REPORT    Reyes Hernandez  MR#: [id number]  : 1960  ACCOUNT #: [de-identified]   DATE OF SERVICE: 2018    PREOPERATIVE DIAGNOSES:   1. L3-L4 stenosis with neurogenic claudication. 2.  L3-L4 spondylolisthesis. 3.  Previous L4-S1 decompression and fusion. POSTOPERATIVE DIAGNOSES:    1. L3-L4 stenosis with neurogenic claudication. 2.  L3-L4 spondylolisthesis. 3.  Previous L4-S1 decompression and fusion. PROCEDURES PERFORMED:  1. L3-L4 laminectomies and foraminotomies  2. L3-L4 fusion with interbody cage (PEEK),  allograft and autograft. 3.  Pedicle screw placement bilaterally at L3.  4.  Exploration of previous fusion, L4-S1.  5.  Removal of rods and replacement with new rods from L3-S1.  6.  Incidental durotomy repaired primarily    SURGEON:  Aurea Nunez MD    ASSISTANT: Radha Flores    ANESTHESIA:  General.    ESTIMATED BLOOD LOSS:  150 mL. COMPLICATIONS:  Incidental durotomy, repaired primarily. SPECIMENS SUBMITTED:  none    IMPLANTS:  A 10 mm PEEK interbody device that was packed with autograft with Progenix, pedicle screws at L3 bilaterally, rods from L3-S1 bilaterally     FINDINGS:  L3-L4 stenosis with scarring from previous fusion, and instability at the L3-L4 level. INDICATIONS FOR SURGERY:  This is a 49-year-old woman who has had 2 previous lumbar fusions. Her last lumbar fusion was 10 years ago at L4-S1. More recently, she has developed back pain with tingling in both lower extremities. In the last several months, she has developed bowel and bladder incontinence. MRI showed stenosis at the L3-L4 level secondary to a bulging disk. Flexion and extension films showed instability at this level. She has failed conservative management and has continued to get worse clinically.     Risks and benefits of surgical decompression and fusion at this level (L3-4) with connection to her previous levels of fusion were discussed in detail. She understood these and agreed to proceed. OPERATIVE COURSE:  The patient was brought to the operating room. She was placed under general endotracheal anesthesia. She was given 2 grams of Ancef within 1 hour prior to incision, and this was written to continue for 24 hours postoperatively. She had SCDs placed, and these were on and functioning during the entire case. After she was placed under general endotracheal anesthesia, she was turned prone onto a Kamran table. We marked her previous incision as well as extending it rostrally to incorporate the L3-L4 level. She was sterilely prepped and draped in standard fashion. I infiltrated the proposed incision with Marcaine with epinephrine prior to incising the skin. I incised the skin with a 10 blade. Hemostasis was obtained with Bovie and bipolar cautery. Dissection was carried down to the spinous process of L3 and the inferior portion of the spinous process of L2, and then in the subperiosteal plane to minimize bleeding along the laminae to the facets at the L3-4 and L2-3 level, taking care to keep the L2/3 facets intact to minimize any instability. There was significant scarring on the caudal aspect at the L3-L4 level. I had good exposure of the L3 lamina. I used Weitlaners for retraction and cerebellars for deeper retraction. I used a Leksell rongeur to remove the spinous process of L3 and to thin the lamina of L3, and then removed the remainder of the lamina after drilling it with a Matchstick. I removed the lamina with a #3 and 4 mm Kerrisons. Again, there was some scarring, but I  was able to release by using anila upbiting curette. I did expose the inferior portion of the L2 lamina. When I had good exposure centrally, I turned my attention laterally, and continued with a wide lateral compression, again using a series of Kerrisons as well as using the FARIDEH De Jesus rongeur. I removed the medial facets at the L3-L4 joint space.   I used the bipolar to bipolar the epidural veins in the lateral recesses. There was significant scarring along the right lateral recess, though I was able to mobilize the thecal sac. After good exposure, I used nerve root retractor and incised the disk space bilaterally using an 11 blade, and then removed disk material with a series of curettes and pituitaries. At one point, I did have an incidental durotomy. This was midline, approximately 5mm. This was closed primarily with 4-0 neurolon. Her dura was thin, but I was able to get watertight closure. I continued with the decompression. Then, I used the scrapers in the Synthes set, first 7 mm and then 9 mm, and continued to perform diskectomy bilaterally again with the scrapers, with pituitaries, and with downbiting curettes. When I had removed the majority of the disk material, I used rasps to prepare the endplates for good fusion surfaces. The bone that I had used from the removal of the spinous process as well as the lamina we saved and mixed this with Progenix. I then used this autograft putty mixture to place inside the disk space. I placed this anteriorly. Next, I turned my attention to the left side and placed a 10 mm PEEK interbody device that was packed with autograft with Progenix. This had good positioning, though it did curve somewhat across midline. I attempted to place an interbody device through the right side. I had a narrow exposure, and I was not able to place the other device as it kept hitting the device already in the disc space. Therefore, I left solitary interbody device in the midline. We did have a small puncture of the nerve root  with some CSF fluid that came out through this small puncture site (samller than the size of an LP needle), but I was able to tamponade this, and it did not require any suture. I then packed the remainder of the disk space with autologous bone, and irrigated copiously. There was good hemostasis.   We then turned our attention to pedicle screw placement bilaterally at the L3 level. We used a Matchstick to make  holes. I used a pedicle probe to find the pedicle. I used a ball point feeler to ensure that there was no breach after the pedicle probe. I tapped each pedicle with a 5.5 mm tap, again making sure that there was no breach with a ballpoint feeler, and then placed on the right a 55 mm long, a 6.5 mm diameter screw and on the left, a 50 mm long, 6.5 mm diameter screw. Both screws had good purchase. I used the Caipiaobao X system, as this is what her previous instrumentation was. I then turned my attention to the previous instrumentation, and exposed all of the screw heads from L4, L5 and S1 bilaterally. I removed the set screws. I removed the rods, and then we placed new rods bilaterally and secured these with set screws. I used the final  for final tightening of these screws. There was good positioning of all instrumentation. The wound again was copiously irrigated with antibiotic irrigation. I used  over 1500 mL of antibiotic irrigation during the course of the case. We used intraoperative x-rays to verify good positioning of all instrumentation. There was good decompression when I finished. I did place a small amount of DuraSeal over the dura over the place where I had sutured, as well as the small pinhole along the nerve root. I also placed Ranjan Elliot, a small paddy over the midline durotomy that I had sutured. I then closed the muscular layer with 0 interrupted Vicryls. Above the muscular layer, I placed a Hemovac drain and then closed with 2-0 interrupted Vicryls for  Rosibel's fascia, 3-0 interrupted Vicryls for the subcutaneous tissue, and 4-0 running subcuticular stitch for the skin. The drain was secured into place with a 2-0 nylon. She tolerated the surgery well, and was taken to the postoperative care unit in stable condition.       MD SHERICE Gonzalez / LN  D: 09/18/2018 13:57     T: 09/18/2018 15:07  JOB #: [id number]

## 2018-09-18 NOTE — PROGRESS NOTES
Pt c/o Numbness to her right inner forearm to last 3 fingers since surgery today. Spoke to [address] [place] [address] advised to wait,it may be due to the way positioned during surgery. No order received.

## 2018-09-18 NOTE — ANESTHESIA POSTPROCEDURE EVALUATION
Post-Anesthesia Evaluation and Assessment Patient: Jacob Ahn MRN: [id number]  SSN: [id number] YOB: 1960  Age: 62 y.o. Sex: female Cardiovascular Function/Vital Signs Visit Vitals  /42  Pulse 94  Temp 36.8 °C (98.2 °F)  Resp 11  
 Ht 5' 4.5\" (1.638 m)  Wt 72.1 kg (159 lb)  SpO2 100%  BMI 26.87 kg/m2 Patient is status post general anesthesia for Procedure(s): 
L3-4 FUSION WITH AUTOGRAFT , INCIDENTAL DUROTOMY. Nausea/Vomiting: None Postoperative hydration reviewed and adequate. Pain: 
Pain Scale 1: Numeric (0 - 10) (09/18/18 1445) Pain Intensity 1: 10 (09/18/18 1445) Managed Neurological Status:  
Neuro (WDL): Within Defined Limits (09/18/18 1349) At baseline Mental Status and Level of Consciousness: Arousable Pulmonary Status:  
O2 Device: Nasal cannula (09/18/18 1350) Adequate oxygenation and airway patent Complications related to anesthesia: None Post-anesthesia assessment completed. No concerns Signed By: Geri Weaver MD   
 September 18, 2018

## 2018-09-18 NOTE — PROGRESS NOTES
TRANSFER - IN REPORT: 
 
Verbal report received from NazRN(name) on Leilani Brittani  being received from [facility]) for routine post - op Report consisted of patients Situation, Background, Assessment and  
Recommendations(SBAR). Information from the following report(s) SBAR, Kardex, OR Summary, Intake/Output and MAR was reviewed with the receiving nurse. Opportunity for questions and clarification was provided. Assessment completed upon patients arrival to unit and care assumed.

## 2018-09-18 NOTE — IP AVS SNAPSHOT
[address] [facility] [address] [place] [address] 
[phone] Patient: Vini Garcia MRN: [id number] EHE:[birth year] About your hospitalization You were admitted on:  September 18, 2018 You last received care in the:  [address] [place] [address] You were discharged on:  September 22, 2018 Why you were hospitalized Your primary diagnosis was:  Not on File Your diagnoses also included:  Lumbar Stenosis With Neurogenic Claudication Follow-up Information Follow up With Details Comments Contact Info [address] [place] [address]   [address] 
1st Floor [facility] [address] 
[phone] Silvio Licea MD   [address] [facility] [address] [facility] 
[phone] Teodoro Washburn MD Schedule an appointment as soon as possible for a visit in 10 days  [address] [place] [address] 
[phone] Discharge Orders None A check trini indicates which time of day the medication should be taken. My Medications START taking these medications Instructions Each Dose to Equal  
 Morning Noon Evening Bedtime HYDROmorphone 2 mg tablet Commonly known as:  DILAUDID Your last dose was: Your next dose is: Take 1-2 Tabs by mouth every four (4) hours as needed. Max Daily Amount: 24 mg.  
 2-4 mg  
    
   
   
   
  
 polyethylene glycol 17 gram packet Commonly known as:  Kathie Kehr Your last dose was: Your next dose is: Take 1 Packet by mouth two (2) times a day. 17 g CONTINUE taking these medications Instructions Each Dose to Equal  
 Morning Noon Evening Bedtime AMBIEN CR 12.5 mg tablet Generic drug:  zolpidem CR Your last dose was: Your next dose is: Take 12.5 mg by mouth nightly as needed for Sleep. 12.5 mg  
    
   
   
   
  
 estradiol 0.5 mg tablet Commonly known as:  ESTRACE Your last dose was: Your next dose is: Take 0.5 mg by mouth daily. 0.5 mg  
    
   
   
   
  
 HYZAAR 100-25 mg per tablet Generic drug:  losartan-hydroCHLOROthiazide Your last dose was: Your next dose is: Take 1 Tab by mouth daily. 1 Tab  
    
   
   
   
  
 LIPITOR 40 mg tablet Generic drug:  atorvastatin Your last dose was: Your next dose is: Take 40 mg by mouth nightly. 40 mg NEURONTIN 300 mg capsule Generic drug:  gabapentin Your last dose was: Your next dose is: Take 300 mg by mouth two (2) times a day. 300 mg  
    
   
   
   
  
 potassium chloride SR 10 mEq tablet Commonly known as:  KLOR-CON 10 Your last dose was: Your next dose is: Take 10 mEq by mouth. Indications: takes caplets 10 mEq PriLOSEC 20 mg capsule Generic drug:  omeprazole Your last dose was: Your next dose is: Take 20 mg by mouth daily. 20 mg  
    
   
   
   
  
 PROAIR HFA 90 mcg/actuation inhaler Generic drug:  albuterol Your last dose was: Your next dose is: Take 1 Puff by inhalation. 1 Puff * levothyroxine 50 mcg tablet Commonly known as:  SYNTHROID Your last dose was: Your next dose is: Take 50 mcg by mouth Daily (before breakfast). Indications: sunday only 50 mcg  
    
   
   
   
  
 * SYNTHROID 100 mcg tablet Generic drug:  levothyroxine Your last dose was: Your next dose is: Take 100 mcg by mouth Daily (before breakfast). Indications: mon through sat  
 100 mcg Venlafaxine 150 mg Tr24 Your last dose was: Your next dose is: Take 150 mg by mouth daily.   
 150 mg  
    
   
   
   
  
 zafirlukast 20 mg tablet Commonly known as:  Daltonmelanie Junior Your last dose was: Your next dose is: Take 20 mg by mouth two (2) times a day. 20 mg  
    
   
   
   
  
 ZyrTEC 10 mg Cap Generic drug:  Cetirizine Your last dose was: Your next dose is: Take 10 mg by mouth nightly. 10 mg  
    
   
   
   
  
 * Notice: This list has 2 medication(s) that are the same as other medications prescribed for you. Read the directions carefully, and ask your doctor or other care provider to review them with you. STOP taking these medications   
 naproxen 500 mg tablet Commonly known as:  NAPROSYN  
   
  
 oxyCODONE IR 5 mg immediate release tablet Commonly known as:  ROXICODONE  
   
  
 traMADol 300 mg Tm24 Where to Get Your Medications Information on where to get these meds will be given to you by the nurse or doctor. ! Ask your nurse or doctor about these medications HYDROmorphone 2 mg tablet  
 polyethylene glycol 17 gram packet Opioid Education Prescription Opioids: What You Need to Know: 
 
 
PCP: Nadya Winslow MD 
 
Follow up appointments 
-follow up with Dr. Homero Dance in 2 weeks. Call [phone] to make an appointment as soon as you get home from the hospital. 
 
When to call your Spine Surgeon: -Signs of infection-if your incision is red; continues to have drainage; drainage has a foul odor or if you have a persistent fever over 101 degrees for 24 hours 
-Nausea or vomiting, severe headache 
-Loss of bowel or bladder function, inability to urinate 
-Changes in sensation in your arms or legs (numbness, tingling, loss of color) -Increased weakness-greater than before your surgery 
-Severe pain or pain not relieved by medications 
-Signs of a blood clot in your leg-calf pain, tenderness, redness, swelling of lower leg When to call your Primary Care Physician: 
-Concerns about medical conditions such as diabetes, high blood pressure, asthma, congestive heart failure 
-Call if blood sugars are elevated, persistent headache or dizziness, coughing or congestion, constipation or diarrhea, burning with urination, abnormal heart rate When to call 911 and go to the nearest emergency room: 
-Acute onset of chest pain, shortness of breath, difficulty breathing Activity 
-You are going home a well person, be as active as possible. Your only exercise should be walking. Start with short frequent walks and increase your walking distance each day. 
-Limit the amount of time you sit to 20-30 minute intervals. Sitting for prolonged periods of time will be uncomfortable for you following surgery. 
-Do NOT lift anything over 5 pounds 
-Do NOT do any straining, twisting or bending 
-When you are in bed, you may lay on your back or on either side. Do NOT lie on your stomach Brace 
-If you have a back brace, you should wear your brace at all times when you are out of bed. Do not wear the brace while in bed or showering. 
-Remember to always wear a cotton t-shirt underneath your brace. 
-Do not bend or twist when your brace is off. Diet 
-Resume usual diet; drink plenty of fluids; eat foods high in fiber 
-It is important to have regular bowel movements.   Pain medications may cause constipation. You may want to take a stool softener (such as Senokot-S or Colace) to prevent constipation. 
 -If constipation occurs, take a laxative (such as Dulcolax tablets, Milk of Magnesia, or a suppository). Laxatives should only be used if the above preventable measures have failed and you still have not had a bowel movement after three days Driving 
-You may not drive or return to work until instructed by your physician. However, you may ride in the car for short periods of time. Incision Care 
-You may take brief showers but do not run the water run directly onto the wound. After showering or bathing, remove the wet dressing and gently blot the wound dry with a soft towel. 
-Do not rub or apply any lotions or ointments to your incision site.  
-Do not soak or scrub your wound 
-Keep a dry dressing (guaze and paper tape) on your incision and have it changed daily for 14 days after surgery; more often if your incision is draining. Have your caregiver wash their hands thoroughly before changing your dressing. 
-You will have absorbable sutures and steristrips (white tape) on your incision. Leave the steristrips on until they fall off. Showering 
-You may shower in approximately 2 days after your surgery.   
-Leave the dressing on during your shower. Do NOT allow the water to run directly onto your dressing. Once you get out of the shower, put on a dry dressing. 
-Reminder- your brace can be removed while showering. Remember to not bend or twist while your brace is off.   
-Do not take a tub bath. Preventing blood clots 
-You have been given T.E.D. stockings to wear. Continue to wear these for 7 days after your discharge. Put them on in the morning and take them off at night.   
-They are used to increase your circulation and prevent blood clots from forming in your legs 
-T. E.D. stockings can be machine washed, temperature not to exceed 160° F (71°C) and machine dried for 15 to 20 minutes, temperature not to exceed 250° F (121°C). Pain management 
-Take pain medication as prescribed; decrease the amount you use as your pain lessens 
-DO not wait until you are in extreme pain to take your medication. 
-Avoid alcoholic beverages while taking pain medication Pain Medication Safety DO: 
-Read the Medication Guide  
-Take your medicine exactly as prescribed  
-Store your medicine away from children and in a safe place  
-Flush unused medicine down the toilet  
-Call your healthcare provider for medical advice about side effects. You may report side effects to FDA at 5-896-FDA-9129.  
-Please be aware that many medications contain Tylenol. We do not want you to over medicate so please read the information below as a guide. Do not take more than 4 Grams of Tylenol in a 24 hour period. (There are 1000 milligrams in one Gram) Percocet contains 325 mg of Tylenol per tablet (do not take more than 12 tablets in 24 hours) Lortab contains 500 mg of Tylenol per tablet (do not take more than 8 tablets in 24 hours) Norco contains 325 mg of Tylenol per tablet (do not take more than 12 tablets in 24 hours). DO NOT: 
-Do not give your medicine to others  
-Do not take medicine unless it was prescribed for you  
-Do not stop taking your medicine without talking to your healthcare provider  
-Do not break, chew, crush, dissolve, or inject your medicine. If you cannot swallow your medicine whole, talk to your healthcare provider. 
-Do not drink alcohol while taking this medicine -Do not take anti-inflammatory medications or aspirin unless instructed by your   physician. Introducing [facility]! Sravanthi Figueroa introduces [employer] patient portal. Now you can access parts of your medical record, email your doctor's office, and request medication refills online. 1. In your internet browser, go to [email or web address] 2. Click on the First Time User? Click Here link in the Sign In box. You will see the New Member Sign Up page. 3. Enter your [employer] Access Code exactly as it appears below. You will not need to use this code after youve completed the sign-up process. If you do not sign up before the expiration date, you must request a new code. · [employer] Access Code: [email or web address] Expires: 9/25/2018  4:36 PM 
 
4. Enter the last four digits of your Social Security Number (xxxx) and Date of Birth (mm/dd/yyyy) as indicated and click Submit. You will be taken to the next sign-up page. 5. Create a [employer] ID. This will be your [employer] login ID and cannot be changed, so think of one that is secure and easy to remember. 6. Create a [employer] password. You can change your password at any time. 7. Enter your Password Reset Question and Answer. This can be used at a later time if you forget your password. 8. Enter your e-mail address. You will receive e-mail notification when new information is available in [address] 9. Click Sign Up. You can now view and download portions of your medical record. 10. Click the Download Summary menu link to download a portable copy of your medical information. If you have questions, please visit the Frequently Asked Questions section of the [employer] website. Remember, [employer] is NOT to be used for urgent needs. For medical emergencies, dial 911. Now available from your iPhone and Android! Introducing Herrera Castano As a Sravanthi Figueroa patient, I wanted to make you aware of our electronic visit tool called Herrera Castano. [employer] 24/7 allows you to connect within minutes with a medical provider 24 hours a day, seven days a week via a mobile device or tablet or logging into a secure website from your computer. You can access [employer] from anywhere in the [place]. A virtual visit might be right for you when you have a simple condition and feel like you just dont want to get out of bed, or cant get away from work for an appointment, when your regular [employer] provider is not available (evenings, weekends or holidays), or when youre out of town and need minor care. Electronic visits cost only $49 and if the [employer] 24/[employer] provider determines a prescription is needed to treat your condition, one can be electronically transmitted to a nearby pharmacy*. Please take a moment to enroll today if you have not already done so. The enrollment process is free and takes just a few minutes. To enroll, please download the [employer] 24/7 bennie to your tablet or phone, or visit [email or web address] to enroll on your computer. And, as an [address] [place] [address] patient with a [employer] account, the results of your visits will be scanned into your electronic medical record and your primary care provider will be able to view the scanned results. We urge you to continue to see your regular [employer] provider for your ongoing medical care. And while your primary care provider may not be the one available when you seek a [facility] [employer] virtual visit, the peace of mind you get from getting a real diagnosis real time can be priceless. For more information on [employer], view our Frequently Asked Questions (FAQs) at [email or web address]. Sincerely, 
 
Obdulia Griffin MD 
Chief Medical Officer Pranav Rutledge *:  certain medications cannot be prescribed via [employer] Providers Seen During Your Hospitalization Provider Specialty Primary office phone Ted Shepherd MD Neurosurgery [phone] Your Primary Care Physician (PCP) Primary Care Physician Office Phone Office Fax Toña Encinas [phone] You are allergic to the following Allergen Reactions Levaquin (Levofloxacin) Hives Itching Lidocaine Hives LIDOCAINE INJECTED FOR SUTURE INSERTION Promethazine Hives Recent Documentation Height Weight BMI OB Status Smoking Status 1.638 m 72.1 kg 26.87 kg/m2 Hysterectomy Never Smoker Emergency Contacts Name Discharge Info Relation Home Work Mobile Leola Chan DISCHARGE CAREGIVER [3] Mother [14] [phone] Patient Belongings The following personal items are in your possession at time of discharge: 
     Visual Aid: Glasses, With patient             Clothing: Other (comment) (clothing bag to pacu)    Other Valuables: Eyeglasses (glasses to pacu) Please provide this summary of care documentation to your next provider. Signatures-by signing, you are acknowledging that this After Visit Summary has been reviewed with you and you have received a copy. Patient Signature:  ____________________________________________________________ Date:  ____________________________________________________________  
  
Aloma Snellen Provider Signature:  ____________________________________________________________ Date:  ____________________________________________________________

## 2018-09-19 ENCOUNTER — HOME HEALTH ADMISSION (OUTPATIENT)
Dept: HOME HEALTH SERVICES | Facility: HOME HEALTH | Age: 58
End: 2018-09-19
Payer: COMMERCIAL

## 2018-09-19 LAB
ANION GAP SERPL CALC-SCNC: 7 MMOL/L (ref 5–15)
BUN SERPL-MCNC: 9 MG/DL (ref 6–20)
BUN/CREAT SERPL: 12 (ref 12–20)
CALCIUM SERPL-MCNC: 7.6 MG/DL (ref 8.5–10.1)
CHLORIDE SERPL-SCNC: 106 MMOL/L (ref 97–108)
CO2 SERPL-SCNC: 31 MMOL/L (ref 21–32)
CREAT SERPL-MCNC: 0.76 MG/DL (ref 0.55–1.02)
GLUCOSE SERPL-MCNC: 131 MG/DL (ref 65–100)
HGB BLD-MCNC: 10.3 G/DL (ref 11.5–16)
POTASSIUM SERPL-SCNC: 3.2 MMOL/L (ref 3.5–5.1)
SODIUM SERPL-SCNC: 144 MMOL/L (ref 136–145)

## 2018-09-19 PROCEDURE — 74011250637 HC RX REV CODE- 250/637: Performed by: NEUROLOGICAL SURGERY

## 2018-09-19 PROCEDURE — 97530 THERAPEUTIC ACTIVITIES: CPT

## 2018-09-19 PROCEDURE — 97161 PT EVAL LOW COMPLEX 20 MIN: CPT

## 2018-09-19 PROCEDURE — G8978 MOBILITY CURRENT STATUS: HCPCS

## 2018-09-19 PROCEDURE — 74011250636 HC RX REV CODE- 250/636: Performed by: NEUROLOGICAL SURGERY

## 2018-09-19 PROCEDURE — 97165 OT EVAL LOW COMPLEX 30 MIN: CPT

## 2018-09-19 PROCEDURE — 65270000029 HC RM PRIVATE

## 2018-09-19 PROCEDURE — 80048 BASIC METABOLIC PNL TOTAL CA: CPT | Performed by: NURSE PRACTITIONER

## 2018-09-19 PROCEDURE — G8979 MOBILITY GOAL STATUS: HCPCS

## 2018-09-19 PROCEDURE — 36415 COLL VENOUS BLD VENIPUNCTURE: CPT | Performed by: NEUROLOGICAL SURGERY

## 2018-09-19 PROCEDURE — 85018 HEMOGLOBIN: CPT | Performed by: NEUROLOGICAL SURGERY

## 2018-09-19 RX ORDER — FAMOTIDINE 20 MG/1
20 TABLET, FILM COATED ORAL 2 TIMES DAILY
Status: DISCONTINUED | OUTPATIENT
Start: 2018-09-19 | End: 2018-09-22 | Stop reason: HOSPADM

## 2018-09-19 RX ORDER — OXYCODONE HYDROCHLORIDE 5 MG/1
5-10 TABLET ORAL
Qty: 50 TAB | Refills: 0 | Status: SHIPPED | OUTPATIENT
Start: 2018-09-19 | End: 2018-09-20

## 2018-09-19 RX ORDER — CALCIUM CARBONATE 200(500)MG
200 TABLET,CHEWABLE ORAL
Status: DISCONTINUED | OUTPATIENT
Start: 2018-09-20 | End: 2018-09-22 | Stop reason: HOSPADM

## 2018-09-19 RX ADMIN — OXYCODONE HYDROCHLORIDE 10 MG: 5 TABLET ORAL at 12:54

## 2018-09-19 RX ADMIN — GABAPENTIN 300 MG: 300 CAPSULE ORAL at 09:15

## 2018-09-19 RX ADMIN — OXYCODONE HYDROCHLORIDE 10 MG: 5 TABLET ORAL at 09:16

## 2018-09-19 RX ADMIN — Medication 2 G: at 05:00

## 2018-09-19 RX ADMIN — VITAMIN D, TAB 1000IU (100/BT) 5000 UNITS: 25 TAB at 09:14

## 2018-09-19 RX ADMIN — Medication 10 ML: at 05:00

## 2018-09-19 RX ADMIN — HYDROMORPHONE HYDROCHLORIDE 4 MG: 4 TABLET ORAL at 05:00

## 2018-09-19 RX ADMIN — ACETAMINOPHEN 650 MG: 325 TABLET ORAL at 12:54

## 2018-09-19 RX ADMIN — OXYCODONE HYDROCHLORIDE 10 MG: 5 TABLET ORAL at 19:10

## 2018-09-19 RX ADMIN — VENLAFAXINE HYDROCHLORIDE 150 MG: 150 CAPSULE, EXTENDED RELEASE ORAL at 09:15

## 2018-09-19 RX ADMIN — Medication 10 ML: at 20:16

## 2018-09-19 RX ADMIN — OXYCODONE HYDROCHLORIDE 10 MG: 5 TABLET ORAL at 15:43

## 2018-09-19 RX ADMIN — FAMOTIDINE 20 MG: 20 TABLET ORAL at 21:15

## 2018-09-19 RX ADMIN — ONDANSETRON 4 MG: 2 INJECTION INTRAMUSCULAR; INTRAVENOUS at 15:47

## 2018-09-19 RX ADMIN — ESTRADIOL 0.5 MG: 1 TABLET ORAL at 09:15

## 2018-09-19 RX ADMIN — ACETAMINOPHEN 650 MG: 325 TABLET ORAL at 19:11

## 2018-09-19 RX ADMIN — DOCUSATE SODIUM 100 MG: 100 CAPSULE, LIQUID FILLED ORAL at 09:15

## 2018-09-19 RX ADMIN — MONTELUKAST SODIUM 10 MG: 10 TABLET, FILM COATED ORAL at 20:16

## 2018-09-19 RX ADMIN — ACETAMINOPHEN 650 MG: 325 TABLET ORAL at 05:00

## 2018-09-19 RX ADMIN — PANTOPRAZOLE SODIUM 40 MG: 40 TABLET, DELAYED RELEASE ORAL at 09:16

## 2018-09-19 RX ADMIN — POTASSIUM CHLORIDE 20 MEQ: 750 TABLET, FILM COATED, EXTENDED RELEASE ORAL at 09:14

## 2018-09-19 RX ADMIN — GABAPENTIN 300 MG: 300 CAPSULE ORAL at 20:15

## 2018-09-19 RX ADMIN — ZOLPIDEM TARTRATE 10 MG: 10 TABLET ORAL at 20:15

## 2018-09-19 RX ADMIN — DOCUSATE SODIUM 100 MG: 100 CAPSULE, LIQUID FILLED ORAL at 19:11

## 2018-09-19 RX ADMIN — LEVOTHYROXINE SODIUM 100 MCG: 100 TABLET ORAL at 09:21

## 2018-09-19 RX ADMIN — ATORVASTATIN CALCIUM 40 MG: 40 TABLET, FILM COATED ORAL at 20:17

## 2018-09-19 RX ADMIN — CYCLOBENZAPRINE HYDROCHLORIDE 10 MG: 10 TABLET, FILM COATED ORAL at 10:43

## 2018-09-19 NOTE — PROGRESS NOTES
Occupational Therapy Note 1320 Chart reviewed, spoke with PT & NP who report patient is on bedrest with HOB flat until noon, RN will increase HOB 30 degrees every hour starting at noon until 90 degrees, if pt tolerates without HA will follow up for OT evaluation later today as able & appropriate. Thank you Kaycee Laureano, OT

## 2018-09-19 NOTE — PROGRESS NOTES
Problem: Mobility Impaired (Adult and Pediatric) Goal: *Acute Goals and Plan of Care (Insert Text) Physical Therapy Goals Initiated 2018 1. Patient will move from supine to sit and sit to supine  and roll side to side in bed with independence within 4 days. 2. Patient will perform sit to stand with modified independence within 4 days. 3. Patient will ambulate with modified independence for 150 feet with the least restrictive device within 4 days. 4. Patient will verbalize and demonstrate understanding of spinal precautions (No bending, lifting greater than 5 lbs, or twisting; log-roll technique; frequent repositioning as instructed) within 4 days. physical Therapy EVALUATION Patient: Jody Monsivais (45 y.o. female) Date: 2018 Primary Diagnosis: LUMBAR STENOSIS, SPONDYLOLISTHESIS Lumbar stenosis with neurogenic claudication Procedure(s) (LRB): 
L3-4 FUSION WITH AUTOGRAFT , INCIDENTAL DUROTOMY (N/A) 1 Day Post-Op Precautions:   Back, Fall, . No bending, no lifting greater than 5 lbs, no twisting, log-roll technique, repositioning every 20-30 min except when sleeping, brace when OOB Pt cleared for therapy this pm per RN, tolerated elevating HOB with no reports of HA 
 
ASSESSMENT : 
Based on the objective data described below, the patient presents with decreased activity tolerance due to orthostatic hypotension, severe back pain, nausea, decreased strength with reported decreased RLE dorsiflexion present pre-op,  standing balance, and impaired functional mobility. Pt denied HA with positional changes. Pt reports she ambulates independently and had several falls in the last year due to tripping from \"dragging her foot(right)\". Pt was provided with education regarding back precautions, log rolling, and donning brace. Pt was familiar with these from previous back surgery. Pt performed log roll transfer to EOB with CGA x 2 and cueing for sequence.   Pt c/o dizziness upon sitting which resolved with rest.  Pt progressed to standing with minimal assist x 2 and immediately c/o dizziness and noted increased palor. Standing BP 80/57, . Pt was assisted back to bed with moderate assistance and reported decreased symptoms except for continued severe back pain. Pt reports she lives alone however her mom is planning to stay with her as long as needed. Pt may benefit from home health pending progress. Patient will benefit from skilled intervention to address the above impairments. Patients rehabilitation potential is considered to be Good Factors which may influence rehabilitation potential include:  
[]         None noted 
[]         Mental ability/status []         Medical condition 
[]         Home/family situation and support systems 
[]         Safety awareness [x]         Pain tolerance/management 
[]         Other: PLAN : 
Recommendations and Planned Interventions: 
[x]           Bed Mobility Training             []    Neuromuscular Re-Education 
[x]           Transfer Training                   [x]    Orthotic/Prosthetic Training 
[x]           Gait Training                         []    Modalities []           Therapeutic Exercises           []    Edema Management/Control 
[]           Therapeutic Activities            [x]    Patient and Family Training/Education 
[]           Other (comment): Frequency/Duration: Patient will be followed by physical therapy  twice daily to address goals. Discharge Recommendations: Home Health and To Be Determined Further Equipment Recommendations for Discharge: to be determined SUBJECTIVE:  
Patient stated I am really dizzy.  OBJECTIVE DATA SUMMARY:  
HISTORY:   
Past Medical History:  
Diagnosis Date  Arthritis DDD  Asthma RESCUE INHALER WHEN NEEDED  Chronic pain CHRONIC BACK AND NECK PAIN  
 GERD (gastroesophageal reflux disease)  Hyperlipemia  Hypertension  Osteopenia  Other ill-defined conditions(799.89) HX: PYELONEPHRITIS AND STONES  
 PUD (peptic ulcer disease)  Sleep apnea USES CPAP  Thyroid disease HYPOTHYROID Past Surgical History:  
Procedure Laterality Date [address] BENIGN MASS RIGHT BREAST  HX BREAST BIOPSY Right 2003 Stereotactic  HX BREAST BIOPSY Right 2005 Surgical excision x2  HX BREAST REDUCTION Bilateral 2004  HX GI    
 COLONOSCOPY/ EGD  HX GI  1980s choleysystectomy  HX GYN  2001  
 hysterectomy  HX HEENT    
 WISDOM TEETH  
 HX OPEN CHOLECYSTECTOMY  HX ORTHOPAEDIC    
 C4, C5, L4, L5, S1 FUSIONS  NEUROLOGICAL PROCEDURE UNLISTED C4, C5, L4, L5, S1 FUSIONS Prior Level of Function/Home Situation:ambulates independently, 3 falls in the last year due to RLE weakness, pt reports she \"drags foot\" and falls occasionally Personal factors and/or comorbidities impacting plan of care: 
 
Home Situation Home Environment: Apartment # Steps to Enter: 0 One/Two Story Residence: One story Living Alone: Yes Support Systems: Parent Patient [facility] Current DME Used/Available at Home: Walker, rolling (has a reacher however father is using it) Tub or Shower Type: Tub/Shower combination EXAMINATION/PRESENTATION/DECISION MAKING:  
Critical Behavior: 
Neurologic State: Alert Orientation Level: Oriented X4 Cognition: Appropriate for age attention/concentration, Appropriate decision making, Follows commands Safety/Judgement: Awareness of environment, Fall prevention, Insight into deficits Skin: dressing intact Range Of Motion: 
AROM: within functional limits Strength:   
Strength: Generally decreased, functional, pt reports RLE weakness, unable to formally test due to decreased pt effort, bilateral dorsiflexion equal due to decreased pt effort ? Tone & Sensation:  
Tone: Normal 
  
 Coordination: 
Coordination: Within functional limits Tracking: Able to track stimulus in all quadrants w/o difficulty Acuity: Within Defined Limits Functional Mobility: 
Bed Mobility: 
Rolling: Contact guard assistance Supine to Sit: Contact guard assistance Sit to Supine: Moderate assistance;Assist x2 (d/t hypotension ) Scooting: Stand-by assistance Donned brace with pt sitting EOB Transfers: 
Sit to Stand: minimal ;Assist x2 Stand to Sit: Contact guard assistance;Assist x2 Balance:  
Sitting: Intact Standing: Impaired Standing - Static: Good Standing - Dynamic : Fair Ambulation/Gait Training: 
 deferred due to hypotension Functional Measure: 
Tinetti test: 
 
Sitting Balance: 1 Arises: 1 Attempts to Rise: 1 Immediate Standing Balance: 1 Standing Balance: 1 Nudged: 0 Eyes Closed: 0 Turn 360 Degrees - Continuous/Discontinuous: 0 Turn 360 Degrees - Steady/Unsteady: 0 Sitting Down: 1 Balance Score: 6 Indication of Gait: 0 
R Step Length/Height: 0 
L Step Length/Height: 0 
R Foot Clearance: 0 
L Foot Clearance: 0 Step Symmetry: 0 Step Continuity: 0 Path: 0 Trunk: 0 Walking Time: 0 Gait Score: 0 Total Score: 6 Tinetti Test and G-code impairment scale: 
Percentage of Impairment CH 
 
0% 
 CI 
 
1-19% CJ 
 
20-39% CK 
 
40-59% CL 
 
60-79% CM 
 
80-99% CN  
 
100% Tinetti Score 0-28 28 23-27 17-22 12-16 6-11 1-5 0 Tinetti Tool Score Risk of Falls 
<19 = High Fall Risk 19-24 = Moderate Fall Risk 25-28 = Low Fall Risk Tinetti ME. Performance-Oriented Assessment of Mobility Problems in Elderly Patients. Muñoz 66; [id number]. (Scoring Description: PT Bulletin Feb. 10, 1993) Older adults: Ruma Cuellar et al, 2009; n = [address] Right Hemisp[place] elderly evaluated with ABC, SHANTELL, ADL, and IADL) · Mean SHANTELL score for males aged 69-68 years = 26.21(3.40) · Mean SHANTELL score for females age 69-68 years = 25.16(4.30) · Mean SHANTELL score for males over 80 years = 23.29(6.02) · Mean SHANTELL score for females over 80 years = 17.20(8.32) G codes: In compliance with CMSs Claims Based Outcome Reporting, the following G-code set was chosen for this patient based on their primary functional limitation being treated: The outcome measure chosen to determine the severity of the functional limitation was the Tinetti with a score of 6/28 which was correlated with the impairment scale. ? Mobility - Walking and Moving Around:  
  - CURRENT STATUS: CL - 60%-79% impaired, limited or restricted  - GOAL STATUS: CK - 40%-59% impaired, limited or restricted  - D/C STATUS:  ---------------To be determined--------------- Physical Therapy Evaluation Charge Determination History Examination Presentation Decision-Making MEDIUM  Complexity : 1-2 comorbidities / personal factors will impact the outcome/ POC  MEDIUM Complexity : 3 Standardized tests and measures addressing body structure, function, activity limitation and / or participation in recreation  MEDIUM Complexity : Evolving with changing characteristics  MEDIUM Complexity : FOTO score of 26-74 Based on the above components, the patient evaluation is determined to be of the following complexity level: MEDIUM Pain: 
Pain Scale 1: Numeric (0 - 10) Pain Intensity 1: 10 
Pain Location 1: Back Pain Orientation 1: Mid 
Pain Description 1: Aching Pain Intervention(s) 1: Medication (see MAR) Activity Tolerance:  
Poor tolerance due to orthostatic hypotension, palor, c/o nausea Please refer to the flowsheet for vital signs taken during this treatment. Vitals:  
 09/19/18 1514 09/19/18 1516 09/19/18 1524 09/19/18 1526 BP: 141/73 109/59 (!) 80/57 143/75 BP 1 Location: Left arm Left leg Left arm Left arm BP Patient Position: Supine Sitting Standing Supine; Post activity Pulse: 94 89 100 88 Resp:      
Temp:      
SpO2:      
Weight:      
Height:      
 
After treatment:  
[]         Patient left in no apparent distress sitting up in chair 
[x]         Patient left in no apparent distress in bed 
[x]         Call bell left within reach [x]         Nursing notified 
[]         Caregiver present 
[]         Bed alarm activated COMMUNICATION/EDUCATION:  
The patients plan of care was discussed with: Registered Nurse. [x]         Fall prevention education was provided and the patient/caregiver indicated understanding. []         Patient/family have participated as able in goal setting and plan of care. [x]         Patient/family agree to work toward stated goals and plan of care. []         Patient understands intent and goals of therapy, but is neutral about his/her participation. []         Patient is unable to participate in goal setting and plan of care. Thank you for this referral. 
Thomas Aldana Time Calculation: 20 mins

## 2018-09-19 NOTE — PROGRESS NOTES
POD 1 
afeb VSS HV - 80cc Hgb - 10.3 No complaints Full strength Incision without erythema, edema or drainage S/p: L3/4 decompression with revision of previous fusion to include L3-S1, incidental durotomy Doing well Keep flat until noon then gradually increase HOB 
PT when able to be OOB 
D/c drain when output less than 30cc/shift

## 2018-09-19 NOTE — PROGRESS NOTES
Care Management Interventions PCP Verified by CM: Yes 
Palliative Care Criteria Met (RRAT>21 & CHF Dx)?: No 
Transition of Care Consult (CM Consult): Home Health [address] Yes MyChart Signup: No 
Discharge Durable Medical Equipment: No 
Physical Therapy Consult: Yes Occupational Therapy Consult: Yes Speech Therapy Consult: No 
Current Support Network: Own Home Confirm Follow Up Transport: Family Plan discussed with Pt/Family/Caregiver: Yes Freedom of Choice Offered: Yes [employer] Information Provided?: No 
Discharge Location Discharge Placement: Home with home health Reason for Admission:   L3-4 FUSION WITH AUTOGRAFT , INCIDENTAL DUROTOMY RRAT Score:     0 Plan for utilizing home health: [address] pending Likelihood of Readmission:  low Transition of Care Plan:     Chart reviewed for transitions of care, discussed patient during rounds. Patient admitted for the above surgery, is to lay flat until noon and will then work with therapy. Cm met with patient to explain role and offer support. Patient is alert and oriented x4, confirmed that she lives alone in a one level home. Her [de-identified]year old mother will be staying with her for a couple of weeks at discharge. Cm discussed how she may need home health at discharge and patient was agreeable to this with no preference of an agency. Cm will send referral to [address] if needed.  
[employer]

## 2018-09-19 NOTE — PROGRESS NOTES
Problem: Self Care Deficits Care Plan (Adult) Goal: *Acute Goals and Plan of Care (Insert Text) Occupational Therapy Goals Initiated 9/19/2018 1. Patient will perform lower body dressing with supervision/set-up using AE PRN within 7 days. 2.  Patient will perform toilet transfer with supervision/set-up using most appropriate DME within 7 days. 3.  Patient will grooming at the sink with modified independence within 7 days. 4.  Patient will don/doff back brace at independence within 7 days. 5.  Patient will verbalize/demonstrate 3/3 back precautions during ADL tasks without cues within 7 days. Occupational Therapy EVALUATION Patient: Yue Buenrostro (27 y.o. female) Date: 9/19/2018 Primary Diagnosis: LUMBAR STENOSIS, SPONDYLOLISTHESIS Lumbar stenosis with neurogenic claudication Procedure(s) (LRB): 
L3-4 FUSION WITH AUTOGRAFT , INCIDENTAL DUROTOMY (N/A) 1 Day Post-Op Precautions: Back, Fall (3 falls in the last year due to RLE weakness ), Quick Draw brace with OOB mobility ASSESSMENT : 
Based on the objective data described below, the patient presents with up to Mod A for upper body ADLs, Max-Total A for lower body ADLs, and CGA-Mod A x2 for functional mobility s/p fusion with incidental durotomy POD #1. PTA, patient IND, living alone with family support nearby, hx of multiple spinal surgeries. Now presents with orthostatic hypotension & dizziness with activity, post-op pain, spinal precautions, decreased functional mobility, generalized weakness, & decreased BLE ROM. Patient received in supine, agreeable to therapy, completing bed mobility with SBA-CGA, slight dizziness reported with tranisiton, improved with rest break (BP WNL). Quick Draw brace donned dependently d/t pain, patient able to stand with CGA x2 and unable to tailor sit d/t pain Increased dizziness reported, returned to EOB, BP 80/57.  Able to briefly sidestep up the bed, returned to supine with Mod A x2 d/t pain, BP improved with return to supine, RN aware. Anticipate patient will progress well with improved hemodynamic stability, recommend HHOT vs. TBD as patient will have mother staying with her upon returning home. Patient will benefit from skilled intervention to address the above impairments. Patients rehabilitation potential is considered to be Good Factors which may influence rehabilitation potential include:  
[]             None noted []             Mental ability/status []             Medical condition []             Home/family situation and support systems []             Safety awareness []             Pain tolerance/management 
[]             Other: PLAN : 
Recommendations and Planned Interventions: 
[x]               Self Care Training                  [x]        Therapeutic Activities [x]               Functional Mobility Training    []        Cognitive Retraining 
[x]               Therapeutic Exercises           [x]        Endurance Activities [x]               Balance Training                   []        Neuromuscular Re-Education []               Visual/Perceptual Training     [x]   Home Safety Training 
[x]               Patient Education                 [x]        Family Training/Education []               Other (comment): Frequency/Duration: Patient will be followed by occupational therapy 5 times a week to address goals. Discharge Recommendations: Home Health vs. TBD Further Equipment Recommendations for Discharge: TBD, may benefit from lower body AE SUBJECTIVE:  
Patient stated Oh I'm a pro at the log roll.  OBJECTIVE DATA SUMMARY:  
HISTORY:  
Past Medical History:  
Diagnosis Date  Arthritis DDD  Asthma RESCUE INHALER WHEN NEEDED  Chronic pain CHRONIC BACK AND NECK PAIN  
 GERD (gastroesophageal reflux disease)  Hyperlipemia  Hypertension  Osteopenia  Other ill-defined conditions(343.10) HX: PYELONEPHRITIS AND STONES  
 PUD (peptic ulcer disease)  Sleep apnea USES CPAP  Thyroid disease HYPOTHYROID Past Surgical History:  
Procedure Laterality Date [address] BENIGN MASS RIGHT BREAST  HX BREAST BIOPSY Right 2003 Stereotactic  HX BREAST BIOPSY Right 2005 Surgical excision x2  HX BREAST REDUCTION Bilateral 2004  HX GI    
 COLONOSCOPY/ EGD  HX GI  1980s choleysystectomy  HX GYN  2001  
 hysterectomy  HX HEENT    
 WISDOM TEETH  
 HX OPEN CHOLECYSTECTOMY  HX ORTHOPAEDIC    
 C4, C5, L4, L5, S1 FUSIONS  NEUROLOGICAL PROCEDURE UNLISTED C4, C5, L4, L5, S1 FUSIONS Prior Level of Function/Environment/Context: IND at baseline, hx of multiple spinal surgeries. Mother will stay with her and assist as long as she is needed. Has a reacher but her father is using it, no other DME. Home Situation Home Environment: Apartment # Steps to Enter: 0 One/Two Story Residence: One story Living Alone: Yes Support Systems: Parent Patient Expects to be Discharged to[de-identified] [facility] Current DME Used/Available at Home: Walker, rolling (has a reacher however father is using it) Tub or Shower Type: Tub/Shower combination Hand dominance: Right EXAMINATION OF PERFORMANCE DEFICITS: 
Cognitive/Behavioral Status: 
Neurologic State: Alert Orientation Level: Oriented X4 Cognition: Appropriate for age attention/concentration; Appropriate decision making; Follows commands Perception: Appears intact Perseveration: No perseveration noted Safety/Judgement: Awareness of environment; Fall prevention; Insight into deficits Skin: Appears intact Edema: None noted in BUEs, slight abdominal swelling Hearing: 
  
 
Vision/Perceptual:   
Tracking: Able to track stimulus in all quadrants w/o difficulty Acuity: Within Defined Limits Range of Motion: In [address] AROM: Generally decreased, functional 
 Strength: In [address] [facility] [address] Strength: Generally decreased, functional 
  
  
  
  
 
Coordination: 
Coordination: Within functional limits Fine Motor Skills-Upper: Left Intact; Right Intact Gross Motor Skills-Upper: Left Intact; Right Intact Tone & Sensation: In [address] [facility] Tone: Normal 
  
  
  
  
  
  
  
 
Balance: 
Sitting: Intact Standing: Impaired Standing - Static: Good Standing - Dynamic : Fair Functional Mobility and Transfers for ADLs: 
Bed Mobility: 
Rolling: Contact guard assistance (Simultaneous filing. User may not have seen previous data.) Supine to Sit: Contact guard assistance (Simultaneous filing. User may not have seen previous data.) Sit to Supine: Moderate assistance;Assist x2 (d/t hypotension Simultaneous filing. User may not have seen previous data.) Scooting: Stand-by assistance (Simultaneous filing. User may not have seen previous data.) Transfers: 
Sit to Stand: Contact guard assistance;Assist x2 (Simultaneous filing. User may not have seen previous data.) Stand to Sit: Contact guard assistance;Assist x2 (Simultaneous filing. User may not have seen previous data.) Bed to Chair:  (deferred d/t hypotension) Toilet Transfer : Minimum assistance; Adaptive equipment (inferred to [facility] per functional mobility & hypotension) ADL Assessment: 
Feeding: Independent Oral Facial Hygiene/Grooming: Supervision (seated d/t pain & hypotension) Bathing: Moderate assistance (for reach to BLEs) Upper Body Dressing: Maximum assistance (including brace; A d/t pain & hypotension) Lower Body Dressing: Maximum assistance (d/t reach to BLEs, pain & hypotension) Toileting: Maximum assistance (d/t pain & hypotension) ADL Intervention and task modifications: 
  
Upper Body Dressing Assistance Orthotics(Brace): Total assistance (dependent) (familiar with brace, incr A d/t pain) Cues: Physical assistance;Verbal cues provided Lower Body Dressing Assistance Socks: Total assistance (dependent); Compensatory technique training (unable to tailor sit d/t pain ) Leg Crossed Method Used: No 
Position Performed: Seated edge of bed Cues: Verbal cues provided;Visual cues provided Toileting Toileting Assistance: Total assistance(dependent) (pat) Cognitive Retraining Safety/Judgement: Awareness of environment; Fall prevention; Insight into deficits Therapeutic Exercise: 
Sit<>stand x2, sidestepping up the bed Functional Measure: 
Barthel Index: 
 
Bathin Bladder: 0 Bowels: 5 Groomin Dressin Feeding: 10 Mobility: 0 Stairs: 0 Toilet Use: 0 Transfer (Bed to Chair and Back): 5 Total: 30 Barthel and G-code impairment scale: 
Percentage of impairment CH 
0% CI 
1-19% CJ 
20-39% CK 
40-59% CL 
60-79% CM 
80-99% CN 
100% Barthel Score 0-100 100 99-80 79-60 59-40 20-39 1-19 
 0 Barthel Score 0-20 20 17-19 13-16 9-12 5-8 1-4 0 The Barthel ADL Index: Guidelines 1. The index should be used as a record of what a patient does, not as a record of what a patient could do. 2. The main aim is to establish degree of independence from any help, physical or verbal, however minor and for whatever reason. 3. The need for supervision renders the patient not independent. 4. A patient's performance should be established using the best available evidence. Asking the patient, friends/relatives and nurses are the usual sources, but direct observation and common sense are also important. However direct testing is not needed. 5. Usually the patient's performance over the preceding 24-48 hours is important, but occasionally longer periods will be relevant. 6. Middle categories imply that the patient supplies over 50 per cent of the effort. 7. Use of aids to be independent is allowed. Zeynep Hernandez., Barthel, D.W. (0003). Functional evaluation: the Barthel Index. [address] [facility] (14)2. CECY Ramsey, Radha Chávez, Belen Louise., Wanda [facility], [address] [facility] [address] (1999). Measuring the change indisability after inpatient rehabilitation; comparison of the responsiveness of the Barthel Index and Functional Johnson Measure. Journal of Neurology, Neurosurgery, and Psychiatry, 66(4), 405-030. GHASSAN Arteaga, BRODIE Adams, & Benjamín Simeon M.A. (2004.) Assessment of post-stroke quality of life in cost-effectiveness studies: The usefulness of the Barthel Index and the EuroQoL-5D. [facility], 13, 257-20 G codes: In compliance with CMSs Claims Based Outcome Reporting, the following G-code set was chosen for this patient based on their primary functional limitation being treated: The outcome measure chosen to determine the severity of the functional limitation was the Barthel Index with a score of 30/100 which was correlated with the impairment scale. ? Self Care:  
  - CURRENT STATUS: CL - 60%-79% impaired, limited or restricted  - GOAL STATUS: CK - 40%-59% impaired, limited or restricted  - D/C STATUS:  ---------------To be determined--------------- Occupational Therapy Evaluation Charge Determination History Examination Decision-Making LOW Complexity : Brief history review  MEDIUM Complexity : 3-5 performance deficits relating to physical, cognitive , or psychosocial skils that result in activity limitations and / or participation restrictions LOW Complexity : No comorbidities that affect functional and no verbal or physical assistance needed to complete eval tasks Based on the above components, the patient evaluation is determined to be of the following complexity level: LOW Pain: 
Pain Scale 1: Numeric (0 - 10) Pain Intensity 1: 10 
Pain Location 1: Back Pain Orientation 1: Mid 
Pain Description 1: Aching Pain Intervention(s) 1: Medication (see MAR) Activity Tolerance:  
Fair-Poor d/t pain & hypotension Please refer to the flowsheet for vital signs taken during this treatment. After treatment:  
[] Patient left in no apparent distress sitting up in chair 
[x] Patient left in no apparent distress in bed 
[x] Call bell left within reach [x] Nursing notified 
[x] Caregiver present 
[] Bed alarm activated COMMUNICATION/EDUCATION:  
The patients plan of care was discussed with: Physical Therapist and Registered Nurse. [x] Home safety education was provided and the patient/caregiver indicated understanding. [x] Patient/family have participated as able in goal setting and plan of care. [x] Patient/family agree to work toward stated goals and plan of care. [] Patient understands intent and goals of therapy, but is neutral about his/her participation. [] Patient is unable to participate in goal setting and plan of care. This patients plan of care is appropriate for delegation to [facility]. Thank you for this referral. 
Omid Gray OT Time Calculation: 22 mins

## 2018-09-19 NOTE — DISCHARGE INSTRUCTIONS
After Hospital Care Plan:  Discharge Instructions Lumbar Fusion Surgery   Dr. Clarita Walton    Patient Name: Sara Klein    Date of procedure: 9/18/2018      Date of discharge: 9/19/2018    Procedure: Procedure(s):  L3-4 FUSION WITH AUTOGRAFT , INCIDENTAL DUROTOMY      PCP: Mary Lou Sapp MD    Follow up appointments  -follow up with Dr. Clarita Walton in 2 weeks. Call [phone] to make an appointment as soon as you get home from the hospital.    When to call your Spine Surgeon:  -Signs of infection-if your incision is red; continues to have drainage; drainage has a foul odor or if you have a persistent fever over 101 degrees for 24 hours  -Nausea or vomiting, severe headache  -Loss of bowel or bladder function, inability to urinate  -Changes in sensation in your arms or legs (numbness, tingling, loss of color)  -Increased weakness-greater than before your surgery  -Severe pain or pain not relieved by medications  -Signs of a blood clot in your leg-calf pain, tenderness, redness, swelling of lower leg    When to call your Primary Care Physician:  -Concerns about medical conditions such as diabetes, high blood pressure, asthma, congestive heart failure  -Call if blood sugars are elevated, persistent headache or dizziness, coughing or congestion, constipation or diarrhea, burning with urination, abnormal heart rate    When to call 911 and go to the nearest emergency room:  -Acute onset of chest pain, shortness of breath, difficulty breathing    Activity  -You are going home a well person, be as active as possible. Your only exercise should be walking. Start with short frequent walks and increase your walking distance each day.  -Limit the amount of time you sit to 20-30 minute intervals.   Sitting for prolonged periods of time will be uncomfortable for you following surgery.  -Do NOT lift anything over 5 pounds  -Do NOT do any straining, twisting or bending  -When you are in bed, you may lay on your back or on either side.  Do NOT lie on your stomach    Brace  -If you have a back brace, you should wear your brace at all times when you are out of bed. Do not wear the brace while in bed or showering.  -Remember to always wear a cotton t-shirt underneath your brace.  -Do not bend or twist when your brace is off. Diet  -Resume usual diet; drink plenty of fluids; eat foods high in fiber  -It is important to have regular bowel movements. Pain medications may cause constipation. You may want to take a stool softener (such as Senokot-S or Colace) to prevent constipation.   -If constipation occurs, take a laxative (such as Dulcolax tablets, Milk of Magnesia, or a suppository). Laxatives should only be used if the above preventable measures have failed and you still have not had a bowel movement after three days    Driving  -You may not drive or return to work until instructed by your physician. However, you may ride in the car for short periods of time. Incision Care  -You may take brief showers but do not run the water run directly onto the wound. After showering or bathing, remove the wet dressing and gently blot the wound dry with a soft towel.  -Do not rub or apply any lotions or ointments to your incision site.   -Do not soak or scrub your wound  -Keep a dry dressing (guaze and paper tape) on your incision and have it changed daily for 14 days after surgery; more often if your incision is draining. Have your caregiver wash their hands thoroughly before changing your dressing.  -You will have absorbable sutures and steristrips (white tape) on your incision. Leave the steristrips on until they fall off. Showering  -You may shower in approximately 2 days after your surgery.    -Leave the dressing on during your shower. Do NOT allow the water to run directly onto your dressing. Once you get out of the shower, put on a dry dressing.  -Reminder- your brace can be removed while showering.  Remember to not bend or twist while your brace is off.    -Do not take a tub bath. Preventing blood clots  -You have been given T.E.D. stockings to wear. Continue to wear these for 7 days after your discharge. Put them on in the morning and take them off at night.    -They are used to increase your circulation and prevent blood clots from forming in your legs  -T. E.D. stockings can be machine washed, temperature not to exceed 160° F (71°C) and machine dried for 15 to 20 minutes, temperature not to exceed 250° F (121°C). Pain management  -Take pain medication as prescribed; decrease the amount you use as your pain lessens  -DO not wait until you are in extreme pain to take your medication.  -Avoid alcoholic beverages while taking pain medication    Pain Medication Safety  DO:  -Read the Medication Guide   -Take your medicine exactly as prescribed   -Store your medicine away from children and in a safe place   -Flush unused medicine down the toilet   -Call your healthcare provider for medical advice about side effects. You may report side effects to FDA at 7-599-FDA-0169.   -Please be aware that many medications contain Tylenol. We do not want you to over medicate so please read the information below as a guide. Do not take more than 4 Grams of Tylenol in a 24 hour period. (There are 1000 milligrams in one Gram)                                                                                                                                                                                                                                                                                                                                                                  Percocet contains 325 mg of Tylenol per tablet (do not take more than 12 tablets in 24 hours)  Lortab contains 500 mg of Tylenol per tablet (do not take more than 8 tablets in 24 hours)  Norco contains 325 mg of Tylenol per tablet (do not take more than 12 tablets in 24 hours).   DO NOT:  -Do not give your medicine to others   -Do not take medicine unless it was prescribed for you   -Do not stop taking your medicine without talking to your healthcare provider   -Do not break, chew, crush, dissolve, or inject your medicine. If you cannot swallow your medicine whole, talk to your healthcare provider.  -Do not drink alcohol while taking this medicine  -Do not take anti-inflammatory medications or aspirin unless instructed by your   physician.

## 2018-09-19 NOTE — PROGRESS NOTES
Problem: Falls - Risk of 
Goal: *Absence of Falls Document Arianna Galvan Fall Risk and appropriate interventions in the flowsheet. Outcome: Progressing Towards Goal 
Fall Risk Interventions: 
Mobility Interventions: Patient to call before getting OOB Medication Interventions: Teach patient to arise slowly, Patient to call before getting OOB Elimination Interventions: Call light in reach

## 2018-09-19 NOTE — PROGRESS NOTES
Orthopedic Spine Progress Note Carlos AGACNP-BC Work Cell: [phone] Post Op Day: 1 Day Post-Op September 19, 2018 9:02 AM  
 
Catarino Leal HPI:  
  
Catarino Leal is a 62 y.o. female with history of sleep apnea, asthma, hypertension, hyperlipidemia, cervical stenosis, and lumbar stenosis. She has underwent two prior cervical fusions and 2 prior lumbar fusions in the past including a L5-S1 fusion and L4-5 fusion with Dr. Bon Curtis. She began to have severe lower extremity radiculopathy earlier this year. She was experiencing periods of bladder and bowel continence. Imaging demonstrated a herniated disc, facet hypertrophy, and instability above her prior fusion at L3-4. After a discussion of the risks, benefits, and alternatives, Catarino Leal consented to undergo a  Procedure(s): 
L3-4 FUSION WITH AUTOGRAFT , INCIDENTAL DUROTOMY Subjective  
  
Patient lying on right side, flat in bed. Small dural tear was repaired intraoperatively. Patient to remain flat in bed until noon and we will start raising her HOB at that point. Drain can be removed (40 cc bloody drainage overnight). She does complain of some RUE numbness from elbow down which likely is due to positioning. She has full rom and strength in this arm. Legs antigravity. No paresthesias. She denies headache, dizziness, vision changes, cp, cough, abdominal pain, f/c, or n/v. 
  
 
 
 
Objective:  
 
Physical Exam: 
General:  Alert and oriented. No acute distress. Respiratory:  Breathing unlabored. Abdomen:  
Extremities: Soft, non-tender, non-distended No evidence of cyanosis. Pulses palpable in both upper and lower extremities. Neurologic: 
 
 
 
Musculoskeletal:  No new motor deficits. Speech clear and linear. Conjugate gaze. Sensation intact to light touch, deep pressure. .  Knee extension, dorsiflexion, plantarflexion, great toe extension 5/5 bilaterally. No clonus. Calves soft, nontender upon palpation and with passive stretch. Moves both upper and lower extremities. Incision:  Lumbar incision TYREE, dry and intact. Dermabond intact. No fluid collection, induration, redness or swelling along incision. Vital Signs:   
Patient Vitals for the past 8 hrs: 
 BP Temp Pulse Resp SpO2  
18 0755 111/66 98.9 °F (37.2 °C) 73 16 97 % Temp (24hrs), Av.6 °F (37 °C), Min:98.2 °F (36.8 °C), Max:99 °F (37.2 °C) Intake/Output: 
  
 1901 -  0700 In: 3000 [I.V.:3000] Out: 2629 [Urine:2925; Drains:80] Pain Control:  
Pain Assessment Pain Scale 1: Numeric (0 - 10) Pain Intensity 1: 8 Pain Onset 1: post op Pain Location 1: Back Pain Orientation 1: Mid 
Pain Description 1: Aching Pain Intervention(s) 1: Medication (see MAR) LAB:   
Recent Labs  
   18 
 0252 HGB  10.3* Lab Results Component Value Date/Time Sodium 141 2018 12:01 PM  
 Potassium 3.7 2018 12:01 PM  
 Chloride 99 2018 12:01 PM  
 CO2 30 2018 12:01 PM  
 Glucose 93 2018 12:01 PM  
 BUN 10 2018 12:01 PM  
 Creatinine 0.77 2018 12:01 PM  
 Calcium 8.8 2018 12:01 PM  
 
 
PT/OT:  
Gait:    
            
 
 
Assessment/Plan  
  
1. 1 Day Post-Op Procedure(s): 
L3-4 FUSION WITH AUTOGRAFT , INCIDENTAL DUROTOMY 
 -Flat in bed until noon then slowly raise HOB in 30 degree increments monitoring for headache. Can be OOB with nursing or PT once tolerating HOB at 90 degrees 
 -Lumbar brace when OOB (family to bring in) 
 -Pain control- APAP, PRN dilaudid, PRN flexeril  
 -D/c stewart in afternoon once mobilized 
 -Encourage Incentive Spirometer 
 -[employer]. Bowel regimen with stool softener + laxative -VTE Prophylaxes - TEDS & SCDs 2. Hypothyroidism 
 -Synthroid 50 mcg on , 100 mcg daily Monday through Saturday 3. Asthma, chronic 
 -recently exacerbated by weather 
 -PRN rescue nebs 
 -continue Singulair 4. RC 
 -CPAP 5. HLD 
 - statin Patient seen and examined with Dr. Earlene Morales who agrees with A/P outlined above Discharge To:  Anticipate readiness for discharge home on POD#3 Signed By: Ace Silva NP

## 2018-09-19 NOTE — PROGRESS NOTES
Physical Therapy Chart reviewed, spoke with NP, pt is on bedrest with HOB flat until noon, RN will increase HOB 30 degrees every hour starting at noon until 90 degrees, if pt tolerates without HA will follow up for PT evaluation later today Mariya Dawson PT

## 2018-09-20 LAB — HGB BLD-MCNC: 9.8 G/DL (ref 11.5–16)

## 2018-09-20 PROCEDURE — 94760 N-INVAS EAR/PLS OXIMETRY 1: CPT

## 2018-09-20 PROCEDURE — 74011000250 HC RX REV CODE- 250: Performed by: NURSE PRACTITIONER

## 2018-09-20 PROCEDURE — 36415 COLL VENOUS BLD VENIPUNCTURE: CPT | Performed by: NEUROLOGICAL SURGERY

## 2018-09-20 PROCEDURE — 97535 SELF CARE MNGMENT TRAINING: CPT

## 2018-09-20 PROCEDURE — 65270000029 HC RM PRIVATE

## 2018-09-20 PROCEDURE — 74011250637 HC RX REV CODE- 250/637: Performed by: NURSE PRACTITIONER

## 2018-09-20 PROCEDURE — 74011250637 HC RX REV CODE- 250/637: Performed by: NEUROLOGICAL SURGERY

## 2018-09-20 PROCEDURE — 97530 THERAPEUTIC ACTIVITIES: CPT

## 2018-09-20 PROCEDURE — 85018 HEMOGLOBIN: CPT | Performed by: NEUROLOGICAL SURGERY

## 2018-09-20 PROCEDURE — 97116 GAIT TRAINING THERAPY: CPT

## 2018-09-20 RX ORDER — POLYETHYLENE GLYCOL 3350 17 G/17G
17 POWDER, FOR SOLUTION ORAL 2 TIMES DAILY
Qty: 30 PACKET | Refills: 0 | Status: SHIPPED | OUTPATIENT
Start: 2018-09-20 | End: 2018-10-14

## 2018-09-20 RX ORDER — HYDROMORPHONE HYDROCHLORIDE 2 MG/1
2-4 TABLET ORAL
Status: DISCONTINUED | OUTPATIENT
Start: 2018-09-20 | End: 2018-09-22 | Stop reason: HOSPADM

## 2018-09-20 RX ORDER — POLYETHYLENE GLYCOL 3350 17 G/17G
17 POWDER, FOR SOLUTION ORAL 2 TIMES DAILY
Status: DISCONTINUED | OUTPATIENT
Start: 2018-09-20 | End: 2018-09-22 | Stop reason: HOSPADM

## 2018-09-20 RX ORDER — HYDROMORPHONE HYDROCHLORIDE 2 MG/1
2-4 TABLET ORAL
Qty: 50 TAB | Refills: 0 | Status: SHIPPED | OUTPATIENT
Start: 2018-09-20 | End: 2018-10-14

## 2018-09-20 RX ADMIN — ATORVASTATIN CALCIUM 40 MG: 40 TABLET, FILM COATED ORAL at 21:02

## 2018-09-20 RX ADMIN — LOSARTAN POTASSIUM: 50 TABLET, FILM COATED ORAL at 08:11

## 2018-09-20 RX ADMIN — POLYETHYLENE GLYCOL 3350 17 G: 17 POWDER, FOR SOLUTION ORAL at 17:11

## 2018-09-20 RX ADMIN — ESTRADIOL 0.5 MG: 1 TABLET ORAL at 08:11

## 2018-09-20 RX ADMIN — DOCUSATE SODIUM 100 MG: 100 CAPSULE, LIQUID FILLED ORAL at 17:11

## 2018-09-20 RX ADMIN — FAMOTIDINE 20 MG: 20 TABLET ORAL at 17:11

## 2018-09-20 RX ADMIN — GABAPENTIN 300 MG: 300 CAPSULE ORAL at 08:10

## 2018-09-20 RX ADMIN — PANTOPRAZOLE SODIUM 40 MG: 40 TABLET, DELAYED RELEASE ORAL at 08:11

## 2018-09-20 RX ADMIN — GABAPENTIN 300 MG: 300 CAPSULE ORAL at 21:02

## 2018-09-20 RX ADMIN — DOCUSATE SODIUM 100 MG: 100 CAPSULE, LIQUID FILLED ORAL at 08:10

## 2018-09-20 RX ADMIN — LEVOTHYROXINE SODIUM 100 MCG: 100 TABLET ORAL at 08:13

## 2018-09-20 RX ADMIN — CYCLOBENZAPRINE HYDROCHLORIDE 10 MG: 10 TABLET, FILM COATED ORAL at 20:00

## 2018-09-20 RX ADMIN — MONTELUKAST SODIUM 10 MG: 10 TABLET, FILM COATED ORAL at 21:02

## 2018-09-20 RX ADMIN — CALCIUM CARBONATE (ANTACID) CHEW TAB 500 MG 200 MG: 500 CHEW TAB at 13:22

## 2018-09-20 RX ADMIN — HYDROMORPHONE HYDROCHLORIDE 4 MG: 2 TABLET ORAL at 22:57

## 2018-09-20 RX ADMIN — ACETAMINOPHEN 650 MG: 325 TABLET ORAL at 06:00

## 2018-09-20 RX ADMIN — ACETAMINOPHEN 650 MG: 325 TABLET ORAL at 00:10

## 2018-09-20 RX ADMIN — ZOLPIDEM TARTRATE 10 MG: 10 TABLET ORAL at 21:02

## 2018-09-20 RX ADMIN — CYCLOBENZAPRINE HYDROCHLORIDE 10 MG: 10 TABLET, FILM COATED ORAL at 13:24

## 2018-09-20 RX ADMIN — HYDROMORPHONE HYDROCHLORIDE 4 MG: 2 TABLET ORAL at 13:22

## 2018-09-20 RX ADMIN — Medication 10 ML: at 06:00

## 2018-09-20 RX ADMIN — POTASSIUM CHLORIDE 20 MEQ: 750 TABLET, FILM COATED, EXTENDED RELEASE ORAL at 08:11

## 2018-09-20 RX ADMIN — FAMOTIDINE 20 MG: 20 TABLET ORAL at 08:11

## 2018-09-20 RX ADMIN — Medication 10 ML: at 21:03

## 2018-09-20 RX ADMIN — ACETAMINOPHEN 650 MG: 325 TABLET ORAL at 13:00

## 2018-09-20 RX ADMIN — VENLAFAXINE HYDROCHLORIDE 150 MG: 150 CAPSULE, EXTENDED RELEASE ORAL at 08:10

## 2018-09-20 RX ADMIN — CALCIUM CARBONATE (ANTACID) CHEW TAB 500 MG 200 MG: 500 CHEW TAB at 08:11

## 2018-09-20 RX ADMIN — VITAMIN D, TAB 1000IU (100/BT) 5000 UNITS: 25 TAB at 08:11

## 2018-09-20 RX ADMIN — OXYCODONE HYDROCHLORIDE 10 MG: 5 TABLET ORAL at 09:50

## 2018-09-20 RX ADMIN — POLYETHYLENE GLYCOL 3350 17 G: 17 POWDER, FOR SOLUTION ORAL at 11:00

## 2018-09-20 RX ADMIN — HYDROMORPHONE HYDROCHLORIDE 4 MG: 2 TABLET ORAL at 17:54

## 2018-09-20 RX ADMIN — CALCIUM CARBONATE (ANTACID) CHEW TAB 500 MG 200 MG: 500 CHEW TAB at 17:11

## 2018-09-20 RX ADMIN — ACETAMINOPHEN 650 MG: 325 TABLET ORAL at 20:00

## 2018-09-20 RX ADMIN — OXYCODONE HYDROCHLORIDE 10 MG: 5 TABLET ORAL at 06:00

## 2018-09-20 NOTE — PROGRESS NOTES
Problem: Self Care Deficits Care Plan (Adult) Goal: *Acute Goals and Plan of Care (Insert Text) Occupational Therapy Goals Initiated 9/19/2018 1. Patient will perform lower body dressing with supervision/set-up using AE PRN within 7 days. 2.  Patient will perform toilet transfer with supervision/set-up using most appropriate DME within 7 days. 3.  Patient will grooming at the sink with modified independence within 7 days. 4.  Patient will don/doff back brace at independence within 7 days. 5.  Patient will verbalize/demonstrate 3/3 back precautions during ADL tasks without cues within 7 days. Occupational Therapy TREATMENT Patient: Padmini Head (84 y.o. female) Date: 9/20/2018 Diagnosis: LUMBAR STENOSIS, SPONDYLOLISTHESIS Lumbar stenosis with neurogenic claudication <principal problem not specified> Procedure(s) (LRB): 
L3-4 FUSION WITH AUTOGRAFT , INCIDENTAL DUROTOMY (N/A) 2 Days Post-Op Precautions: Back, Fall (3 falls in the last year due to RLE weakness ) Chart, occupational therapy assessment, plan of care, and goals were reviewed. ASSESSMENT: 
Patient received in supine, reporting pain but agreeable to therapy. Improved bed mobility with SPV and bed rail use. Sitting EOB, improved BLE ROM this session (decreased pain & dizziness from last session), able to tailor sit to doff/don socks with Mod I, utilizing reacher & SBA to doff/don pajama pants with cues for technique. Returned to supine with SPV, all needs met. Patient progressing well, continue to recommend [address] [place] [address]'[place] upon d/c. Progression toward goals: 
[x]       Improving appropriately and progressing toward goals 
[]       Improving slowly and progressing toward goals 
[]       Not making progress toward goals and plan of care will be adjusted PLAN: 
Patient continues to benefit from skilled intervention to address the above impairments. Continue treatment per established plan of care. Discharge Recommendations:  Home Health Further Equipment Recommendations for Discharge:  Bonnie Maciel SUBJECTIVE:  
Patient stated My mom said she couldn't fine the reacher my dad borrowed.  OBJECTIVE DATA SUMMARY:  
Cognitive/Behavioral Status: 
Neurologic State: Alert Orientation Level: Oriented X4 Cognition: Appropriate for age attention/concentration; Appropriate decision making; Appropriate safety awareness; Follows commands Perception: Appears intact Perseveration: No perseveration noted Safety/Judgement: Awareness of environment; Fall prevention Functional Mobility and Transfers for ADLs: 
Bed Mobility: 
Rolling: Supervision Supine to Sit: Supervision Sit to Supine: Supervision Transfers: 
Sit to Stand: Stand-by assistance Balance: 
Sitting: Intact Standing: Impaired Standing - Static: Good Standing - Dynamic : Fair ADL Intervention: 
  
 
  
 
  
 
  
 
Upper Body Dressing Assistance Orthotics(Brace): Supervision/set-up Lower Body Dressing Assistance Dressing Assistance: Stand-by assistance Underpants: Compensatory technique training Pants With Elastic Waist: Stand-by assistance; Compensatory technique training Socks: Modified independent; Compensatory technique training Leg Crossed Method Used: Yes Position Performed: Seated edge of bed;Standing Cues: Verbal cues provided;Visual cues provided Adaptive Equipment Used: Reacher Cognitive Retraining Safety/Judgement: Awareness of environment; Fall prevention Therapeutic Exercises:  
Sit<>stand x3 with SBA Pain: 
Pain Scale 1: Numeric (0 - 10) Pain Intensity 1: 5 Pain Intervention(s) 1: Rest 
 
Activity Tolerance:  
Good Please refer to the flowsheet for vital signs taken during this treatment. After treatment:  
[] Patient left in no apparent distress sitting up in chair 
[x] Patient left in no apparent distress in bed 
[x] Call bell left within reach [x] Nursing notified [] Caregiver present 
[] Bed alarm activated COMMUNICATION/COLLABORATION:  
The patients plan of care was discussed with: Physical Therapist and Registered Nurse Laya Pal OT Time Calculation: 27 mins

## 2018-09-20 NOTE — PROGRESS NOTES
Problem: Mobility Impaired (Adult and Pediatric) Goal: *Acute Goals and Plan of Care (Insert Text) Physical Therapy Goals Initiated 9/19/2018 1. Patient will move from supine to sit and sit to supine  and roll side to side in bed with independence within 4 days. 2. Patient will perform sit to stand with modified independence within 4 days. 3. Patient will ambulate with modified independence for 150 feet with the least restrictive device within 4 days. 4. Patient will verbalize and demonstrate understanding of spinal precautions (No bending, lifting greater than 5 lbs, or twisting; log-roll technique; frequent repositioning as instructed) within 4 days. physical Therapy TREATMENT Patient: Jody Monsivais (54 y.o. female) Date: 9/20/2018 Diagnosis: LUMBAR STENOSIS, SPONDYLOLISTHESIS Lumbar stenosis with neurogenic claudication <principal problem not specified> Procedure(s) (LRB): 
L3-4 FUSION WITH AUTOGRAFT , INCIDENTAL DUROTOMY (N/A) 2 Days Post-Op Precautions: Back, Fall (3 falls in the last year due to RLE weakness ) Chart, physical therapy assessment, plan of care and goals were reviewed. ASSESSMENT: 
Pt agreeable to therapy. Pt was able to increase mobility tolerance. Pt did require min A for sit>stand and gait. Pt had increase pain with mobility. Pt had improved BP with positional changes. Will continue to progress as able. Supine 114/57 HR91 Seated 109/59 HR 93 Standing 119/66 HR 93 SpO2 98% on room air Progression toward goals: 
[x]      Improving appropriately and progressing toward goals 
[]      Improving slowly and progressing toward goals 
[]      Not making progress toward goals and plan of care will be adjusted PLAN: 
Patient continues to benefit from skilled intervention to address the above impairments. Continue treatment per established plan of care. Discharge Recommendations:  Home Health vs TBD due to lives alone unsure of support Further Equipment Recommendations for Discharge:  TBD none? SUBJECTIVE:  
Patient stated Vipin Black had a lot of gas pain.  The patient stated 3/3 back precautions. Reviewed all 3 with patient. OBJECTIVE DATA SUMMARY:  
Critical Behavior: 
Neurologic State: Alert Orientation Level: Oriented X4 Cognition: Appropriate decision making, Follows commands, Appropriate for age attention/concentration Safety/Judgement: Awareness of environment, Fall prevention, Insight into deficits Functional Mobility Training: 
Bed Mobility: 
Log Rolling: Stand-by assistance Supine to Sit: Stand-by assistance Brace donned with  moderate assistance Transfers: 
Sit to Stand: Minimum assistance Stand to Sit: Contact guard assistance Balance: 
Sitting: Intact Standing: Impaired Standing - Static: Good Standing - Dynamic : Fair Ambulation/Gait Training: 
Distance (ft): 60 Feet (ft) Assistive Device: Brace/Splint;Gait belt Ambulation - Level of Assistance: Minimal assistance Gait Abnormalities: Antalgic;Decreased step clearance Base of Support: Narrowed Speed/Shaniqua: Slow Step Length: Left shortened;Right shortened Stairs: Therapeutic Exercises:  
 
Pain: 
Pain Scale 1: Numeric (0 - 10) Pain Intensity 1: 5 Pain Intervention(s) 1: Rest 
Activity Tolerance:  
Limited Please refer to the flowsheet for vital signs taken during this treatment. After treatment:  
[x]  Patient left in no apparent distress sitting up in chair 
[]  Patient left in no apparent distress in bed 
[x]  Call bell left within reach [x]  Nursing notified 
[]  Caregiver present 
[]  Bed alarm activated COMMUNICATION/COLLABORATION:  
The patients plan of care was discussed with: Registered Nurse Viola Bhakta PTA Time Calculation: 24 mins

## 2018-09-20 NOTE — PROGRESS NOTES
Neurosurgery Spine Progress Note Bryan Mulligan, AGACNP-BC Work Cell: [phone] Post Op Day: 2 Day Post-Op September 20, 2018 9:02 AM  
 
Pari Reyes HPI:  
  
Pari Reyes is a 62 y.o. female with history of sleep apnea, asthma, hypertension, hyperlipidemia, cervical stenosis, and lumbar stenosis. She has underwent two prior cervical fusions and 2 prior lumbar fusions in the past including a L5-S1 fusion and L4-5 fusion with Dr. Juve Reynolds. She began to have severe lower extremity radiculopathy earlier this year. She was experiencing periods of bladder and bowel continence. Imaging demonstrated a herniated disc, facet hypertrophy, and instability above her prior fusion at L3-4. After a discussion of the risks, benefits, and alternatives, Pari Reyes consented to undergo a  Procedure(s): 
L3-4 FUSION WITH AUTOGRAFT , INCIDENTAL DUROTOMY Subjective  
  
Patient reports a mild headache overnight but this was not positional in nature. Had stabbing back pain overnight as well as some abdominal fullness. Improved this am. She is tolerating diet and passing gas. Drain out 9/19. Legs antigravity. No paresthesias. She denies current headache, dizziness, vision changes, cp, cough, abdominal pain, f/c, or n/v. 
  
 
 
 
Objective:  
 
Physical Exam: 
General:  Alert and oriented. No acute distress. Respiratory:  Breathing unlabored. Abdomen:  
Extremities: Soft, non-tender, non-distended No evidence of cyanosis. Pulses palpable in both upper and lower extremities. Neurologic: 
 
 
 
Musculoskeletal:  No new motor deficits. Speech clear and linear. Conjugate gaze. Sensation intact to light touch, deep pressure. .  Knee extension, dorsiflexion, plantarflexion, great toe extension 5/5 bilaterally. No clonus. Calves soft, nontender upon palpation and with passive stretch. Moves both upper and lower extremities. Incision:  Lumbar incision TYREE, dry and intact. Dermabond intact.   No fluid collection, induration, redness or swelling along incision. Vital Signs:   
Patient Vitals for the past 8 hrs: 
 BP Temp Pulse Resp SpO2  
18 0801 137/76 99.1 °F (37.3 °C) (!) 102 16 95 % 18 0338 128/71 98.4 °F (36.9 °C) (!) 104 16 96 % Temp (24hrs), Av.3 °F (37.4 °C), Min:98.4 °F (36.9 °C), Max:100.2 °F (37.9 °C) Intake/Output: 
  
 1901 -  0700 In: -  
Out: 6355 [CFCKB:4104; Drains:100] Pain Control:  
Pain Assessment Pain Scale 1: Numeric (0 - 10) Pain Intensity 1: 5 Pain Onset 1: post op Pain Location 1: Back Pain Orientation 1: Mid 
Pain Description 1: Burning Pain Intervention(s) 1: Rest 
 
LAB:   
Recent Labs  
   18 
 0229 HGB  9.8* Lab Results Component Value Date/Time Sodium 144 2018 10:15 AM  
 Potassium 3.2 (L) 2018 10:15 AM  
 Chloride 106 2018 10:15 AM  
 CO2 31 2018 10:15 AM  
 Glucose 131 (H) 2018 10:15 AM  
 BUN 9 2018 10:15 AM  
 Creatinine 0.76 2018 10:15 AM  
 Calcium 7.6 (L) 2018 10:15 AM  
 
 
PT/OT:  
Gait:    
            
 
 
Assessment/Plan  
  
1. 2 Days Post-Op Procedure(s): 
L3-4 FUSION WITH AUTOGRAFT , INCIDENTAL DUROTOMY 
 -Continue PT/OT 
 -Lumbar brace when OOB (family to bring in) 
 -Pain control- APAP, PRN dilaudid, PRN flexeril  
 -D/c stewart  
 -Encourage Incentive Spirometer 
 -Tolerating diet. Bowel regimen with stool softener + laxative -VTE Prophylaxes - TEDS & SCDs 2. Hypothyroidism 
 -Synthroid 50 mcg on , 100 mcg daily Monday through Saturday 3. Asthma, chronic 
 -recently exacerbated by weather 
 -PRN rescue nebs 
 -continue Singulair 4. RC 
 -CPAP- family to bring in 
 
5. HLD 
 - statin Patient seen and examined with Dr. Bhavin Sheppard who agrees with A/P outlined above Discharge To:  Possibly tomorrow pending progress with PT/OT Signed By: Mark Dykes NP

## 2018-09-20 NOTE — PROGRESS NOTES
POD 2 
afeb VSS Hgb - 9.8 
+ flatus, no BM 
C/o abdominal distension, improving Full strength Incision without erythema, edema or drainage S/p: L3/4 decompression with revision of previous fusion to include L3-S1, incidental durotomy Doing well PT and OOB Plan will be home with home health and PT when she is ready

## 2018-09-20 NOTE — PROGRESS NOTES
Problem: Mobility Impaired (Adult and Pediatric) Goal: *Acute Goals and Plan of Care (Insert Text) Physical Therapy Goals Initiated 9/19/2018 1. Patient will move from supine to sit and sit to supine  and roll side to side in bed with independence within 4 days. 2. Patient will perform sit to stand with modified independence within 4 days. 3. Patient will ambulate with modified independence for 150 feet with the least restrictive device within 4 days. 4. Patient will verbalize and demonstrate understanding of spinal precautions (No bending, lifting greater than 5 lbs, or twisting; log-roll technique; frequent repositioning as instructed) within 4 days. physical Therapy TREATMENT Patient: Juliano Fermin (6 y.o. female) Date: 9/20/2018 Diagnosis: LUMBAR STENOSIS, SPONDYLOLISTHESIS Lumbar stenosis with neurogenic claudication <principal problem not specified> Procedure(s) (LRB): 
L3-4 FUSION WITH AUTOGRAFT , INCIDENTAL DUROTOMY (N/A) 2 Days Post-Op Precautions: Back, Fall (3 falls in the last year due to RLE weakness ) Chart, physical therapy assessment, plan of care and goals were reviewed. ASSESSMENT: 
Pt was able to maintain mobility tolerance. Pt is requiring assistance with transfers and gait. Pt was min A with gait and HHA. Pt may benefit from rolling walker to assist with balance. Pt lives alone in an apartment. She is planning on having her mom assist at discharge. Pt is hopeful to discharge Saturday. Pt could benefit from HHPT at discharge. Progression toward goals: 
[x]    Improving appropriately and progressing toward goals 
[]    Improving slowly and progressing toward goals 
[]    Not making progress toward goals and plan of care will be adjusted PLAN: 
Patient continues to benefit from skilled intervention to address the above impairments. Continue treatment per established plan of care. Discharge Recommendations:  Home Health Further Equipment Recommendations for Discharge: Owns rolling walker SUBJECTIVE:  
Patient stated I just laid down.  OBJECTIVE DATA SUMMARY:  
Critical Behavior: 
Neurologic State: Alert Orientation Level: Oriented X4 Cognition: Appropriate decision making, Follows commands, Appropriate for age attention/concentration Safety/Judgement: Awareness of environment, Fall prevention, Insight into deficits Functional Mobility Training: 
Bed Mobility: 
Rolling: Stand-by assistance Supine to Sit: Stand-by assistance 
  
 sit to supine min A Transfers: 
Sit to Stand: Minimum assistance Stand to Sit: Contact guard assistance Balance: 
Sitting: Intact Standing: Impaired Standing - Static: Good Standing - Dynamic : Fair Ambulation/Gait Training: 
Distance (ft): 60 Feet (ft) Assistive Device: Brace/Splint;Gait belt Ambulation - Level of Assistance: Minimal assistance Gait Abnormalities: Antalgic;Decreased step clearance Base of Support: Narrowed Speed/Shaniqua: Slow Step Length: Left shortened;Right shortened Stairs: 
  
  
   
 
Neuro Re-Education: 
 
Therapeutic Exercises:  
 
Pain: 
Pain Scale 1: Numeric (0 - 10) Pain Intensity 1: 5 Pain Intervention(s) 1: Rest 
Activity Tolerance:  
Limited Please refer to the flowsheet for vital signs taken during this treatment. After treatment:  
[]    Patient left in no apparent distress sitting up in chair 
[x]    Patient left in no apparent distress in bed 
[x]    Call bell left within reach [x]    Nursing notified 
[]    Caregiver present 
[]    Bed alarm activated COMMUNICATION/COLLABORATION:  
The patients plan of care was discussed with: Registered Nurse Sukhjinder Snell PTA Time Calculation: 17 mins

## 2018-09-21 PROCEDURE — 97116 GAIT TRAINING THERAPY: CPT | Performed by: PHYSICAL THERAPIST

## 2018-09-21 PROCEDURE — 65270000029 HC RM PRIVATE

## 2018-09-21 PROCEDURE — 97535 SELF CARE MNGMENT TRAINING: CPT

## 2018-09-21 PROCEDURE — 74011000250 HC RX REV CODE- 250: Performed by: NURSE PRACTITIONER

## 2018-09-21 PROCEDURE — 74011250637 HC RX REV CODE- 250/637: Performed by: NURSE PRACTITIONER

## 2018-09-21 PROCEDURE — 74011250637 HC RX REV CODE- 250/637: Performed by: NEUROLOGICAL SURGERY

## 2018-09-21 RX ADMIN — HYDROMORPHONE HYDROCHLORIDE 4 MG: 2 TABLET ORAL at 12:08

## 2018-09-21 RX ADMIN — ACETAMINOPHEN 650 MG: 325 TABLET ORAL at 18:14

## 2018-09-21 RX ADMIN — Medication 10 ML: at 07:00

## 2018-09-21 RX ADMIN — PANTOPRAZOLE SODIUM 40 MG: 40 TABLET, DELAYED RELEASE ORAL at 08:34

## 2018-09-21 RX ADMIN — CALCIUM CARBONATE (ANTACID) CHEW TAB 500 MG 200 MG: 500 CHEW TAB at 08:33

## 2018-09-21 RX ADMIN — POTASSIUM CHLORIDE 20 MEQ: 750 TABLET, FILM COATED, EXTENDED RELEASE ORAL at 08:35

## 2018-09-21 RX ADMIN — VITAMIN D, TAB 1000IU (100/BT) 5000 UNITS: 25 TAB at 08:33

## 2018-09-21 RX ADMIN — ESTRADIOL 0.5 MG: 1 TABLET ORAL at 08:34

## 2018-09-21 RX ADMIN — ZOLPIDEM TARTRATE 10 MG: 10 TABLET ORAL at 22:00

## 2018-09-21 RX ADMIN — CALCIUM CARBONATE (ANTACID) CHEW TAB 500 MG 200 MG: 500 CHEW TAB at 16:46

## 2018-09-21 RX ADMIN — DOCUSATE SODIUM 100 MG: 100 CAPSULE, LIQUID FILLED ORAL at 18:14

## 2018-09-21 RX ADMIN — LEVOTHYROXINE SODIUM 100 MCG: 100 TABLET ORAL at 07:05

## 2018-09-21 RX ADMIN — ACETAMINOPHEN 650 MG: 325 TABLET ORAL at 07:00

## 2018-09-21 RX ADMIN — GABAPENTIN 300 MG: 300 CAPSULE ORAL at 22:00

## 2018-09-21 RX ADMIN — DOCUSATE SODIUM 100 MG: 100 CAPSULE, LIQUID FILLED ORAL at 08:35

## 2018-09-21 RX ADMIN — HYDROMORPHONE HYDROCHLORIDE 4 MG: 2 TABLET ORAL at 20:32

## 2018-09-21 RX ADMIN — CYCLOBENZAPRINE HYDROCHLORIDE 10 MG: 10 TABLET, FILM COATED ORAL at 07:00

## 2018-09-21 RX ADMIN — POLYETHYLENE GLYCOL 3350 17 G: 17 POWDER, FOR SOLUTION ORAL at 18:13

## 2018-09-21 RX ADMIN — GABAPENTIN 300 MG: 300 CAPSULE ORAL at 07:00

## 2018-09-21 RX ADMIN — VENLAFAXINE HYDROCHLORIDE 150 MG: 150 CAPSULE, EXTENDED RELEASE ORAL at 08:33

## 2018-09-21 RX ADMIN — MONTELUKAST SODIUM 10 MG: 10 TABLET, FILM COATED ORAL at 22:00

## 2018-09-21 RX ADMIN — CYCLOBENZAPRINE HYDROCHLORIDE 10 MG: 10 TABLET, FILM COATED ORAL at 18:13

## 2018-09-21 RX ADMIN — FAMOTIDINE 20 MG: 20 TABLET ORAL at 18:13

## 2018-09-21 RX ADMIN — HYDROMORPHONE HYDROCHLORIDE 4 MG: 2 TABLET ORAL at 03:13

## 2018-09-21 RX ADMIN — FAMOTIDINE 20 MG: 20 TABLET ORAL at 08:34

## 2018-09-21 RX ADMIN — HYDROMORPHONE HYDROCHLORIDE 4 MG: 2 TABLET ORAL at 16:46

## 2018-09-21 RX ADMIN — ACETAMINOPHEN 650 MG: 325 TABLET ORAL at 03:14

## 2018-09-21 RX ADMIN — POLYETHYLENE GLYCOL 3350 17 G: 17 POWDER, FOR SOLUTION ORAL at 08:36

## 2018-09-21 RX ADMIN — ATORVASTATIN CALCIUM 40 MG: 40 TABLET, FILM COATED ORAL at 22:00

## 2018-09-21 RX ADMIN — Medication 10 ML: at 14:21

## 2018-09-21 RX ADMIN — CALCIUM CARBONATE (ANTACID) CHEW TAB 500 MG 200 MG: 500 CHEW TAB at 12:08

## 2018-09-21 RX ADMIN — ACETAMINOPHEN 650 MG: 325 TABLET ORAL at 12:08

## 2018-09-21 NOTE — PROGRESS NOTES
POD 3 Headache last night Improved today Tmax 100.2,  Tachycardic at times + flatus, no BM Abdomen soft Full strength LE Incision without erythema, edema or drainage S/p: L3/4 decompression with revision of previous fusion to include L3-S1, incidental durotomy Low grade temp last night - if recurs she may need CBC Encourage incentive spirometry Headache improved today - will follow closely, no signs of CSF leak Continue encourage OOB

## 2018-09-21 NOTE — PROGRESS NOTES
Neurosurgery Spine Progress Note [facility] [place] 
[phone] Post Op Day: 3 Day Post-Op September 21, 2018 9:02 AM  
 
Yue Buenrostro HPI:  
  
Yue Buenrostro is a 62 y.o. female with history of sleep apnea, asthma, hypertension, hyperlipidemia, cervical stenosis, and lumbar stenosis. She has underwent two prior cervical fusions and 2 prior lumbar fusions in the past including a L5-S1 fusion and L4-5 fusion with Dr. Radha Salgado. She began to have severe lower extremity radiculopathy earlier this year. She was experiencing periods of bladder and bowel continence. Imaging demonstrated a herniated disc, facet hypertrophy, and instability above her prior fusion at L3-4. After a discussion of the risks, benefits, and alternatives, Yue Buenrostro consented to undergo a  Procedure(s): 
L3-4 FUSION WITH AUTOGRAFT , INCIDENTAL DUROTOMY Subjective  
  
Patient reports pain is better today than yesterday. She has not had a bowel movement yet. She is tolerating diet and passing gas. She had a low grade fever overnight. No fever since then. Drain out 9/19. Legs antigravity. No paresthesias. She denies current headache, dizziness, vision changes, cp, cough, abdominal pain, f/c, or n/v. 
  
 
 
 
Objective:  
 
Physical Exam: 
General:  Alert and oriented. No acute distress. Respiratory:  Breathing unlabored. Lungs CTA BL Abdomen:  
Extremities: Soft, non-tender, non-distended. Hypoactive bowel sounds No evidence of cyanosis. Pulses palpable in both upper and lower extremities. Neurologic: 
 
 
 
Musculoskeletal:  No new motor deficits. Speech clear and linear. Conjugate gaze. Sensation intact to light touch, deep pressure. .  Knee extension, dorsiflexion, plantarflexion, great toe extension 5/5 bilaterally. No clonus. Calves soft, nontender upon palpation and with passive stretch. Moves both upper and lower extremities. Incision:  Lumbar incision TYREE, dry and intact. Dermabond intact.   No fluid collection, induration, redness or swelling along incision. Vital Signs:   
Patient Vitals for the past 8 hrs: 
 BP Temp Pulse Resp SpO2  
18 0831 109/70 98.1 °F (36.7 °C) 86 16 91 % Temp (24hrs), Av.9 °F (37.2 °C), Min:98.1 °F (36.7 °C), Max:100.2 °F (37.9 °C) Intake/Output: 
  
 1901 -  0700 In: -  
Out: [address] [Urine:3680] Pain Control:  
Pain Assessment Pain Scale 1: Numeric (0 - 10) Pain Intensity 1: 4 Pain Onset 1: post op Pain Location 1: Back Pain Orientation 1: Lower Pain Description 1: Sharp, Burning Pain Intervention(s) 1: Medication (see MAR) LAB:   
Recent Labs  
   18 
 0229 HGB  9.8* Lab Results Component Value Date/Time Sodium 144 2018 10:15 AM  
 Potassium 3.2 (L) 2018 10:15 AM  
 Chloride 106 2018 10:15 AM  
 CO2 31 2018 10:15 AM  
 Glucose 131 (H) 2018 10:15 AM  
 BUN 9 2018 10:15 AM  
 Creatinine 0.76 2018 10:15 AM  
 Calcium 7.6 (L) 2018 10:15 AM  
 
 
PT/OT:  
Gait:  Gait Base of Support: Narrowed Speed/Shaniqua: Pace decreased (<100 feet/min) Step Length: Left shortened, Right shortened Gait Abnormalities: Decreased step clearance Ambulation - Level of Assistance: Stand-by assistance Distance (ft): 150 Feet (ft) Assistive Device: Brace/Splint, Gait belt, Walker, rolling Assessment/Plan  
  
1. 2 Days Post-Op Procedure(s): 
L3-4 FUSION WITH AUTOGRAFT , INCIDENTAL DUROTOMY 
 -Continue PT/OT 
 -Lumbar brace when OOB (family to bring in) 
 -Pain control- APAP, PRN dilaudid, PRN flexeril  
 -Encourage Incentive Spirometer 
 -Tolerating diet. Bowel regimen with stool softener + laxative. Can add magnesium citrate PRN 
 -VTE Prophylaxes - TEDS & SCDs 2. Hypothyroidism 
 -Synthroid 50 mcg on , 100 mcg daily Monday through Saturday 3. Asthma, chronic 
 -recently exacerbated by weather 
 -PRN rescue nebs 
 -continue Singulair 4. RC -CPAP- family to bring in 
 
5. HLD 
 - statin Will discuss plan with Dr. Homero Dance. Discharge To:  TBD. Pt likely discharge to home in am. Case management setting up home health.  
 
Signed By: Arnel Calix NP

## 2018-09-21 NOTE — PROGRESS NOTES
Problem: Mobility Impaired (Adult and Pediatric) Goal: *Acute Goals and Plan of Care (Insert Text) Physical Therapy Goals Initiated 9/19/2018 1. Patient will move from supine to sit and sit to supine  and roll side to side in bed with independence within 4 days. 2. Patient will perform sit to stand with modified independence within 4 days. 3. Patient will ambulate with modified independence for 150 feet with the least restrictive device within 4 days. 4. Patient will verbalize and demonstrate understanding of spinal precautions (No bending, lifting greater than 5 lbs, or twisting; log-roll technique; frequent repositioning as instructed) within 4 days. physical Therapy TREATMENT Patient: Jody Monsivais (89 y.o. female) Date: 9/21/2018 Diagnosis: LUMBAR STENOSIS, SPONDYLOLISTHESIS Lumbar stenosis with neurogenic claudication <principal problem not specified> Procedure(s) (LRB): 
L3-4 FUSION WITH AUTOGRAFT , INCIDENTAL DUROTOMY (N/A) 3 Days Post-Op Precautions: Back, Fall (3 falls in the last year due to RLE weakness ) Chart, physical therapy assessment, plan of care and goals were reviewed. ASSESSMENT: 
Patient is resting in bed on arrival.  Agreeable to ambulate. Independent with bed mobility doing the log roll. Good sitting balance. Independent with putting on the quick draw brace. Preferred to ambulate today with a RW so a RW was provided and she ambulated 150 feet slowly. Steady gait with no balance loss. She returned to supine to rest as she had been up earlier for breakfast.  Progressing well. She anticipates discharge tomorrow. She doesn't have any steps, has a RW and is to get [facility]. Progression toward goals: 
[x]      Improving appropriately and progressing toward goals 
[]      Improving slowly and progressing toward goals 
[]      Not making progress toward goals and plan of care will be adjusted PLAN: 
Patient continues to benefit from skilled intervention to address the above impairments. Continue treatment per established plan of care. Discharge Recommendations:  Home Health Further Equipment Recommendations for Discharge:  none SUBJECTIVE:  
Patient stated I am going home tomorrow.  The patient stated 3/3 back precautions. Reviewed all 3 with patient. OBJECTIVE DATA SUMMARY:  
Critical Behavior: 
Neurologic State: Alert Orientation Level: Oriented X4 Cognition: Appropriate for age attention/concentration, Appropriate decision making, Appropriate safety awareness, Follows commands Safety/Judgement: Awareness of environment, Fall prevention Functional Mobility Training: 
Bed Mobility: 
Log Rolling: Independent Supine to Sit: Independent Sit to Supine: Independent Scooting: Independent Brace donned with  supervision/set-up Transfers: 
Sit to Stand: Independent Stand to Sit: Independent Balance: 
Sitting: Intact Standing: Intact; With support Standing - Static: Constant support;Good Standing - Dynamic : Good Ambulation/Gait Training: 
Distance (ft): 150 Feet (ft) Assistive Device: Brace/Splint;Gait belt;Walker, rolling Ambulation - Level of Assistance: Stand-by assistance Gait Abnormalities: Decreased step clearance Base of Support: Narrowed Speed/Shaniqua: Pace decreased (<100 feet/min) Step Length: Left shortened;Right shortened Slow teady gait with a RW. Pain: 
Pain Scale 1: Numeric (0 - 10) Pain Intensity 1: 4 Pain Intervention(s) 1: Medication (see MAR) Activity Tolerance:  
Good. Please refer to the flowsheet for vital signs taken during this treatment. After treatment:  
[]  Patient left in no apparent distress sitting up in chair 
[x]  Patient left in no apparent distress in bed 
[x]  Call bell left within reach [x]  Nursing notified 
[]  Caregiver present 
[]  Bed alarm activated COMMUNICATION/COLLABORATION:  
 The patients plan of care was discussed with: Registered Nurse Key Esteban, PT Time Calculation: 18 mins

## 2018-09-21 NOTE — PROGRESS NOTES
Spiritual Care Partner Volunteer visited patient in Rm [id number] on 9/21/18. Documented by: Chaplain Ruiz MDiv, MACE 
[phone] ([id number])

## 2018-09-21 NOTE — PROGRESS NOTES
Problem: Mobility Impaired (Adult and Pediatric) Goal: *Acute Goals and Plan of Care (Insert Text) Physical Therapy Goals Initiated 9/19/2018 1. Patient will move from supine to sit and sit to supine  and roll side to side in bed with independence within 4 days. 2. Patient will perform sit to stand with modified independence within 4 days. 3. Patient will ambulate with modified independence for 150 feet with the least restrictive device within 4 days. 4. Patient will verbalize and demonstrate understanding of spinal precautions (No bending, lifting greater than 5 lbs, or twisting; log-roll technique; frequent repositioning as instructed) within 4 days. physical Therapy TREATMENT Patient: Silver Cornell (20 y.o. female) Date: 9/21/2018 Precautions: Back, Fall (3 falls in the last year due to RLE weakness ) Chart, physical therapy assessment, plan of care and goals were reviewed. ASSESSMENT: 
Patient reports she is not able to get a RW for home use so an order for a RW was placed this afternoon. She is independent with bed mobility. Ambulated 200 feet slowly in 5 minutes with a RW. Independent with putting on the quick draw brace. She lives in an apt and doesn't have steps. Her mother is present and will be with her on discharge. Cooperative and motivated. She has been getting up to the bathroom with nursing. She anticipates discharge in the morning. Progression toward goals: 
[x]      Improving appropriately and progressing toward goals 
[]      Improving slowly and progressing toward goals 
[]      Not making progress toward goals and plan of care will be adjusted PLAN: 
Patient continues to benefit from skilled intervention to address the above impairments. Continue treatment per established plan of care. Discharge Recommendations:  Home Health Further Equipment Recommendations for Discharge:  rolling walker SUBJECTIVE:  
 Patient stated I can't trust my brother to do anything and I don't know if we have a walker.  The patient stated 3/3 back precautions. Reviewed all 3 with patient. OBJECTIVE DATA SUMMARY:  
Functional Mobility Training: 
Bed Mobility: 
Log Rolling: Independent Supine to Sit: Independent Sit to Supine: Independent Scooting: Independent Brace donned with  supervision/set-up Transfers: 
Sit to Stand: Independent Stand to Sit: Independent Ambulation/Gait Training: 
Distance (ft): 200 Feet (ft) Assistive Device: Walker, rolling;Gait belt;Brace/Splint Ambulation - Level of Assistance: Stand-by assistance Gait Abnormalities: Decreased step clearance Base of Support: Narrowed Speed/Shaniqua: Pace decreased (<100 feet/min) Step Length: Right shortened;Left shortened Slow gait. Steady. Uses a RW. Pain: 
Pain Scale 1: Numeric (0 - 10) Pain Intensity 1: 2 Pain Intervention(s) 1: Medication (see MAR) Activity Tolerance:  
Good. Please refer to the flowsheet for vital signs taken during this treatment. After treatment:  
[]  Patient left in no apparent distress sitting up in chair 
[x]  Patient left in no apparent distress in bed 
[x]  Call bell left within reach [x]  Nursing notified 
[x]  Caregiver present 
[]  Bed alarm activated COMMUNICATION/COLLABORATION:  
The patients plan of care was discussed with: Registered Nurse Sid Carlson, PT Time Calculation: 17 mins

## 2018-09-21 NOTE — PROGRESS NOTES
Problem: Self Care Deficits Care Plan (Adult) Goal: *Acute Goals and Plan of Care (Insert Text) Occupational Therapy Goals Initiated 9/19/2018 1. Patient will perform lower body dressing with supervision/set-up using AE PRN within 7 days. 2.  Patient will perform toilet transfer with supervision/set-up using most appropriate DME within 7 days. 3.  Patient will grooming at the sink with modified independence within 7 days. 4.  Patient will don/doff back brace at independence within 7 days. 5.  Patient will verbalize/demonstrate 3/3 back precautions during ADL tasks without cues within 7 days. Occupational Therapy TREATMENT Patient: Adis Camp (4 y.o. female) Date: 9/21/2018 Diagnosis: LUMBAR STENOSIS, SPONDYLOLISTHESIS Lumbar stenosis with neurogenic claudication <principal problem not specified> Procedure(s) (LRB): 
L3-4 FUSION WITH AUTOGRAFT , INCIDENTAL DUROTOMY (N/A) 3 Days Post-Op Precautions: Back, Fall (3 falls in the last year due to RLE weakness ) No bending, no lifting greater than 5 lbs, no twisting, log-roll technique, repositioning every 20-30 min except when sleeping, Quickdraw brace when OOB Chart, occupational therapy assessment, plan of care, and goals were reviewed. ASSESSMENT: 
Patient was received supine in bed agreeable to participate in OT intervention. Patient verbalized 3/3 spinal precautions and demonstrated log roll technique for sit > supine transfer x supervision with verbal cues from OT for patient to push up from bed with BUE to obtain midline position. Patient donned pants using reacher demonstrating competence with AE. Patient was SBA for functional mobility to bathroom and for transfer <> toilet. Patient completed grooming tasks standing at sink x CGA secondary to patient reporting unsteadiness and requiring sink for support.  OT educated patient on home safety and on pressure relief techniques for spine. OT also educated patient on BUE exercise however patient reporting pain with activity. Recommend continued HHOT services to maximize safety and independence with ADL tasks. Progression toward goals: 
[x]          Improving appropriately and progressing toward goals 
[]          Improving slowly and progressing toward goals 
[]          Not making progress toward goals and plan of care will be adjusted PLAN: 
Patient continues to benefit from skilled intervention to address the above impairments. Continue treatment per established plan of care. Discharge Recommendations:  Home Health Further Equipment Recommendations for Discharge:  rolling walker, reacher, sockaid, LH sponge, sockaid, LH shoe horn SUBJECTIVE:  
Patient stated I feel like if I let go of the sink I wouldn't be stable.  The patient stated 3/3 back precautions. Reviewed all 3 with patient. OBJECTIVE DATA SUMMARY:  
Cognitive/Behavioral Status: 
Neurologic State: Alert Orientation Level: Oriented X4 Cognition: Poor safety awareness, Appropriate decision making Safety/Judgement: Awareness of environment Functional Mobility and Transfers for ADLs: 
Bed Mobility: 
Rolling: Independent Supine to Sit: Supervision (verbal cues for utilizing BUE to obtain midline position) Sit to Supine: Independent Scooting: Independent Transfers: 
Sit to Stand: Modified independent (with increased time for safety) Functional Transfers Bathroom Mobility: Minimum assistance Toilet Transfer : Stand-by assistance Cues: Verbal cues provided Balance: 
Sitting: High guard; Without support Standing: Impaired; Without support Standing - Static: Constant support;Good Standing - Dynamic : Fair ADL Intervention and Instruction: 
  
 
Grooming (requiring CGA intermittently due to decreased dynamic standing balance) Grooming Assistance: Stand-by assistance (standing at sink w/ decreased dynamic standing balance) Washing Face: Stand-by assistance Brushing Teeth: Stand-by assistance Brushing/Combing Hair: Stand-by assistance Cues: Verbal cues provided Lower Body Dressing Assistance Pants With Elastic Waist: Modified independent Leg Crossed Method Used: Yes Adaptive Equipment Used: Reacher Toileting Bladder Hygiene: Supervision/set-up Clothing Management: Supervision/set-up Cues: Verbal cues provided Cognitive Retraining Safety/Judgement: Awareness of environment Dressing brace: Patient instructed and demonstrated to don/doff velcro on brace using dominant side, keeping non-dominant side intact. Patient instructed and demonstrated in meantime of being able to stand with back against wall to don/doff brace, to don/doff seated using lap and bed/chair surface to support brace while manipulating. Dressing lower body: Patient instructed to don brace first and on the benefits to remain seated to don all clothing to increase independence with precautions and pain management. Home safety: Patient instructed and indicated understanding on home modifications and safety (raise height of ADL objects, appropriate height of chair surfaces, recliner safety, change of floor surfaces, clear pathways) to increase independence and fall prevention. Standing: Patient instructed and indicated understanding to walk up to sink/counter top/surfaces to increase adherence to back precautions and fall prevention. Patient instructed to increase amount of time standing in order to increase independence and tolerance with ADLs. During prolonged standing, can open cabinet door or place foot on stool to decrease spinal pressure/increase pain. Patient instructed and indicated understanding the benefits of maintaining activity tolerance, functional mobility, and independence with self care tasks during acute stay  to ensure safe return home and to baseline.  Encouraged patient to increase frequency and duration OOB, not sitting longer than 30 mins without marching/walking with staff, be out of bed for all meals, perform daily ADLs (as approved by RN/MD regarding bathing etc), and performing functional mobility to/from bathroom. Patient instruction and indicated understanding on body mechanics, ergonomics and gravitational force on the spine during different body positions to plan activities in prep for return home to complete instrumental ADLs and back to work safely. Therapeutic Exercises:  
Patient instructed on the benefits shoulder exercises to increase independence with ADLs, active ROM, and strength of spine. Patient attempted BUE exercise however reported increased pain with activity and terminated. Pain: 
Pain Scale 1: Numeric (0 - 10) Pain Intensity 1: 2 Pain Intervention(s) 1: Medication (see MAR) Activity Tolerance: VSS Please refer to the flowsheet for vital signs taken during this treatment. After treatment:  
[] Patient left in no apparent distress sitting up in chair 
[x] Patient left in no apparent distress in bed 
[x] Call bell left within reach [x] Nursing notified 
[x] Caregiver present 
[] Bed alarm activated COMMUNICATION/COLLABORATION:  
The patients plan of care was discussed with: Physical Therapist and Registered Nurse Cleo Tsai Time Calculation: 30 mins

## 2018-09-22 VITALS
WEIGHT: 159 LBS | DIASTOLIC BLOOD PRESSURE: 79 MMHG | BODY MASS INDEX: 26.49 KG/M2 | OXYGEN SATURATION: 97 % | RESPIRATION RATE: 16 BRPM | SYSTOLIC BLOOD PRESSURE: 128 MMHG | TEMPERATURE: 99.6 F | HEART RATE: 97 BPM | HEIGHT: 65 IN

## 2018-09-22 PROCEDURE — 74011250637 HC RX REV CODE- 250/637: Performed by: NURSE PRACTITIONER

## 2018-09-22 PROCEDURE — 74011250637 HC RX REV CODE- 250/637: Performed by: NEUROLOGICAL SURGERY

## 2018-09-22 PROCEDURE — 97535 SELF CARE MNGMENT TRAINING: CPT

## 2018-09-22 PROCEDURE — 74011000250 HC RX REV CODE- 250: Performed by: NURSE PRACTITIONER

## 2018-09-22 RX ADMIN — CALCIUM CARBONATE (ANTACID) CHEW TAB 500 MG 200 MG: 500 CHEW TAB at 09:07

## 2018-09-22 RX ADMIN — PANTOPRAZOLE SODIUM 40 MG: 40 TABLET, DELAYED RELEASE ORAL at 09:08

## 2018-09-22 RX ADMIN — Medication 10 ML: at 02:07

## 2018-09-22 RX ADMIN — ACETAMINOPHEN 650 MG: 325 TABLET ORAL at 02:04

## 2018-09-22 RX ADMIN — POLYETHYLENE GLYCOL 3350 17 G: 17 POWDER, FOR SOLUTION ORAL at 09:07

## 2018-09-22 RX ADMIN — VENLAFAXINE HYDROCHLORIDE 150 MG: 150 CAPSULE, EXTENDED RELEASE ORAL at 09:09

## 2018-09-22 RX ADMIN — Medication 10 ML: at 06:05

## 2018-09-22 RX ADMIN — FAMOTIDINE 20 MG: 20 TABLET ORAL at 09:08

## 2018-09-22 RX ADMIN — ESTRADIOL 0.5 MG: 1 TABLET ORAL at 09:09

## 2018-09-22 RX ADMIN — POTASSIUM CHLORIDE 20 MEQ: 750 TABLET, FILM COATED, EXTENDED RELEASE ORAL at 09:08

## 2018-09-22 RX ADMIN — CALCIUM CARBONATE (ANTACID) CHEW TAB 500 MG 200 MG: 500 CHEW TAB at 11:14

## 2018-09-22 RX ADMIN — VITAMIN D, TAB 1000IU (100/BT) 5000 UNITS: 25 TAB at 09:08

## 2018-09-22 RX ADMIN — LEVOTHYROXINE SODIUM 100 MCG: 100 TABLET ORAL at 06:04

## 2018-09-22 RX ADMIN — ACETAMINOPHEN 650 MG: 325 TABLET ORAL at 06:04

## 2018-09-22 RX ADMIN — CYCLOBENZAPRINE HYDROCHLORIDE 10 MG: 10 TABLET, FILM COATED ORAL at 09:16

## 2018-09-22 RX ADMIN — LOSARTAN POTASSIUM: 50 TABLET, FILM COATED ORAL at 09:08

## 2018-09-22 RX ADMIN — HYDROMORPHONE HYDROCHLORIDE 4 MG: 2 TABLET ORAL at 06:04

## 2018-09-22 RX ADMIN — HYDROMORPHONE HYDROCHLORIDE 4 MG: 2 TABLET ORAL at 11:14

## 2018-09-22 RX ADMIN — DOCUSATE SODIUM 100 MG: 100 CAPSULE, LIQUID FILLED ORAL at 09:09

## 2018-09-22 RX ADMIN — GABAPENTIN 300 MG: 300 CAPSULE ORAL at 07:18

## 2018-09-22 RX ADMIN — HYDROMORPHONE HYDROCHLORIDE 4 MG: 2 TABLET ORAL at 02:04

## 2018-09-22 NOTE — PROGRESS NOTES
Problem: Self Care Deficits Care Plan (Adult) Goal: *Acute Goals and Plan of Care (Insert Text) Occupational Therapy Goals Initiated 9/19/2018 1. Patient will perform lower body dressing with supervision/set-up using AE PRN within 7 days. 2.  Patient will perform toilet transfer with supervision/set-up using most appropriate DME within 7 days. 3.  Patient will grooming at the sink with modified independence within 7 days. 4.  Patient will don/doff back brace at independence within 7 days. 5.  Patient will verbalize/demonstrate 3/3 back precautions during ADL tasks without cues within 7 days. Occupational Therapy TREATMENT Patient: Sara Klein (58 y.o. female) Date: 9/22/2018 Diagnosis: LUMBAR STENOSIS, SPONDYLOLISTHESIS Lumbar stenosis with neurogenic claudication <principal problem not specified> Procedure(s) (LRB): 
L3-4 FUSION WITH AUTOGRAFT , INCIDENTAL DUROTOMY (N/A) 4 Days Post-Op Precautions: Back, Fall (3 falls in the last year due to RLE weakness ) Chart, occupational therapy assessment, plan of care, and goals were reviewed. ASSESSMENT: 
Patient received in bed agreeable to dressing activity. Mod I with log roll technique to sit EOB. Demonstrated ability to tailor sit on both sides and educated patient on dressing RLE first (weaker side). Donned pants, underpants, and socks with mod I tailor sit and standing once to pull up pants/underpants. Donned brace independently in sitting. Patient completed chair transfer without RW with supervision. States she will use RW for longer distances, demonstrated good balance throughout. Recalled 3/3 back precautions. Patient planning d/c home today and with no further acute OT needs at this time.   
 
Recommend with nursing patient to complete as able in order to maintain strength, endurance and independence: ADLs with supervision/setup, OOB to chair 3x/day and mobilizing to the bathroom for toileting with 1 assist. Thank you for your assistance. Progression toward goals: 
[x]       Improving appropriately and progressing toward goals 
[]       Improving slowly and progressing toward goals 
[]       Not making progress toward goals and plan of care will be adjusted PLAN: 
Patient continues to benefit from skilled intervention to address the above impairments. Continue treatment per established plan of care. Discharge Recommendations:  Home Health and none Further Equipment Recommendations for Discharge:  None SUBJECTIVE:  
Patient stated I'm ready to go! Deepika Camejo OBJECTIVE DATA SUMMARY:  
Cognitive/Behavioral Status: 
Neurologic State: Alert Orientation Level: Oriented X4 Cognition: Appropriate decision making; Appropriate for age attention/concentration; Appropriate safety awareness Functional Mobility and Transfers for ADLs: 
Bed Mobility: 
Supine to Sit: Modified independent Transfers: 
Sit to Stand: Supervision Balance: 
Sitting: Intact; Without support Standing: Intact; Without support Standing - Static: Good Standing - Dynamic : Good ADL Intervention: 
  
 
  
 
  
 
  
 
Upper Body Dressing Assistance Orthotics(Brace): Independent Lower Body Dressing Assistance Dressing Assistance: Modified independent Neuro Re-Education: 
  
  
  
Therapeutic Exercises:  
 
Pain: 
Pain Scale 1: Numeric (0 - 10) Pain Intensity 1: 6 Pain Location 1: Back Pain Description 1: Aching Pain Intervention(s) 1: Medication (see MAR) Activity Tolerance: VSS Please refer to the flowsheet for vital signs taken during this treatment. After treatment:  
[x] Patient left in no apparent distress sitting up in chair 
[] Patient left in no apparent distress in bed 
[x] Call bell left within reach [x] Nursing notified 
[] Caregiver present 
[] Bed alarm activated COMMUNICATION/COLLABORATION:  
The patients plan of care was discussed with: Physical Therapist 
 
Queta Felix Time Calculation: 12 mins

## 2018-09-22 NOTE — PROGRESS NOTES
Physical Therapy Patient up in chair, dressed and ready to DC home. States she feels comfortable with amb with RW and does not have any stairs to enter or navigate at home. Patient does not wish to ambulate with PT at this time. However, patient is safe to DC from a mobility standpoint.  
Jasper Burk, PT, DPT

## 2018-09-23 ENCOUNTER — HOME CARE VISIT (OUTPATIENT)
Dept: SCHEDULING | Facility: HOME HEALTH | Age: 58
End: 2018-09-23
Payer: COMMERCIAL

## 2018-09-23 VITALS
SYSTOLIC BLOOD PRESSURE: 118 MMHG | DIASTOLIC BLOOD PRESSURE: 78 MMHG | TEMPERATURE: 98 F | OXYGEN SATURATION: 96 % | HEART RATE: 75 BPM | RESPIRATION RATE: 16 BRPM

## 2018-09-23 PROCEDURE — G0151 HHCP-SERV OF PT,EA 15 MIN: HCPCS

## 2018-09-25 ENCOUNTER — HOME CARE VISIT (OUTPATIENT)
Dept: SCHEDULING | Facility: HOME HEALTH | Age: 58
End: 2018-09-25
Payer: COMMERCIAL

## 2018-09-25 VITALS
DIASTOLIC BLOOD PRESSURE: 72 MMHG | SYSTOLIC BLOOD PRESSURE: 122 MMHG | HEART RATE: 82 BPM | OXYGEN SATURATION: 95 % | RESPIRATION RATE: 16 BRPM | TEMPERATURE: 98.3 F

## 2018-09-25 PROCEDURE — G0151 HHCP-SERV OF PT,EA 15 MIN: HCPCS

## 2018-09-27 ENCOUNTER — HOME CARE VISIT (OUTPATIENT)
Dept: SCHEDULING | Facility: HOME HEALTH | Age: 58
End: 2018-09-27
Payer: COMMERCIAL

## 2018-09-27 VITALS
TEMPERATURE: 98.3 F | SYSTOLIC BLOOD PRESSURE: 118 MMHG | HEART RATE: 86 BPM | DIASTOLIC BLOOD PRESSURE: 80 MMHG | RESPIRATION RATE: 16 BRPM | OXYGEN SATURATION: 98 %

## 2018-09-27 VITALS
TEMPERATURE: 97.8 F | DIASTOLIC BLOOD PRESSURE: 78 MMHG | OXYGEN SATURATION: 94 % | HEART RATE: 99 BPM | RESPIRATION RATE: 18 BRPM | SYSTOLIC BLOOD PRESSURE: 120 MMHG

## 2018-09-27 PROCEDURE — G0151 HHCP-SERV OF PT,EA 15 MIN: HCPCS

## 2018-09-27 PROCEDURE — G0152 HHCP-SERV OF OT,EA 15 MIN: HCPCS

## 2018-10-01 ENCOUNTER — HOME CARE VISIT (OUTPATIENT)
Dept: HOME HEALTH SERVICES | Facility: HOME HEALTH | Age: 58
End: 2018-10-01
Payer: COMMERCIAL

## 2018-10-02 NOTE — DISCHARGE SUMMARY
[facility] 2. SUMMARY    Name:Chantel ZAPATA  MR#: [id number]  : 1960  ACCOUNT #: [de-identified]   ADMIT DATE: 2018  DISCHARGE DATE: 2018    REASON FOR ADMISSION:  L3-4 lumbar stenosis and spondylolisthesis requiring surgical decompression and stabilization. PROCEDURES DURING HOSPITAL COURSE:  L3-4 laminectomy, foraminotomy, decompression and fusion and extension of fusion to S1.    HOSPITAL COURSE:  The patient underwent a lumbar fusion on . She did very well postoperatively and she did have a dural tear intraoperatively that was repaired primarily. She was kept flat for 24 hours and gradually her head of bed was increased. She did not have any signs of headache, did not have any signs of spinal fluid leak. She was seen by physical therapy and occupational therapy during her hospital stay. She was discharged home after being determined cleared from physical therapy and occupational therapy. She is scheduled to follow up with myself in 2 weeks. DISCHARGE MEDICATIONS:  Dilaudid 2 mg 1-2 tablets q.4 hours, MiraLax packet 2 times a day, Accolate 20 mg 2 times a day, Hyzaar 100/25 mg every day, venlafaxine 150 mg every day, Synthroid 50 mcg every day, Ambien CR 12.5 mg at bedtime, Klor-Con, Zyrtec 10 mg every day, Prilosec 20 mg p.o. every day, Estrace 0.5 mg p.o. every day, Lipitor 40 mg p.o. at bedtime, Neurontin 300 mg b.i.d., Synthroid 100 mcg every day, oxycodone IR 1-2 tablets q.4 hours p.r.n., Roxicodone 5 mg 1-2 tablets q.4 hours.     DISPOSITION:  MD SHERICE Kim/BERNARD  D: 10/01/2018 17:37     T: 10/01/2018 21:43  JOB #: [id number]

## 2018-10-03 ENCOUNTER — HOME CARE VISIT (OUTPATIENT)
Dept: SCHEDULING | Facility: HOME HEALTH | Age: 58
End: 2018-10-03
Payer: COMMERCIAL

## 2018-10-03 VITALS
TEMPERATURE: 98.3 F | RESPIRATION RATE: 16 BRPM | OXYGEN SATURATION: 97 % | DIASTOLIC BLOOD PRESSURE: 70 MMHG | HEART RATE: 93 BPM | SYSTOLIC BLOOD PRESSURE: 118 MMHG

## 2018-10-03 PROCEDURE — G0151 HHCP-SERV OF PT,EA 15 MIN: HCPCS

## 2018-10-04 ENCOUNTER — HOME CARE VISIT (OUTPATIENT)
Dept: SCHEDULING | Facility: HOME HEALTH | Age: 58
End: 2018-10-04
Payer: COMMERCIAL

## 2018-10-04 VITALS
HEART RATE: 84 BPM | TEMPERATURE: 98.3 F | OXYGEN SATURATION: 95 % | RESPIRATION RATE: 16 BRPM | SYSTOLIC BLOOD PRESSURE: 122 MMHG | DIASTOLIC BLOOD PRESSURE: 68 MMHG

## 2018-10-04 VITALS
OXYGEN SATURATION: 98 % | TEMPERATURE: 97.4 F | SYSTOLIC BLOOD PRESSURE: 130 MMHG | HEART RATE: 68 BPM | DIASTOLIC BLOOD PRESSURE: 70 MMHG | RESPIRATION RATE: 18 BRPM

## 2018-10-04 PROCEDURE — G0152 HHCP-SERV OF OT,EA 15 MIN: HCPCS

## 2018-10-04 PROCEDURE — G0151 HHCP-SERV OF PT,EA 15 MIN: HCPCS

## 2018-10-14 ENCOUNTER — HOSPITAL ENCOUNTER (INPATIENT)
Age: 58
LOS: 6 days | Discharge: HOME | DRG: 030 | End: 2018-10-20
Attending: EMERGENCY MEDICINE | Admitting: NEUROLOGICAL SURGERY
Payer: COMMERCIAL

## 2018-10-14 DIAGNOSIS — M54.50 LOW BACK PAIN WITHOUT SCIATICA, UNSPECIFIED BACK PAIN LATERALITY, UNSPECIFIED CHRONICITY: ICD-10-CM

## 2018-10-14 DIAGNOSIS — G97.1 SPINAL HEADACHE: Primary | ICD-10-CM

## 2018-10-14 PROBLEM — M54.9 BACK PAIN: Status: ACTIVE | Noted: 2018-10-14

## 2018-10-14 LAB
ALBUMIN SERPL-MCNC: 4 G/DL (ref 3.5–5)
ALBUMIN/GLOB SERPL: 1.3 {RATIO} (ref 1.1–2.2)
ALP SERPL-CCNC: 85 U/L (ref 45–117)
ALT SERPL-CCNC: 50 U/L (ref 12–78)
ANION GAP SERPL CALC-SCNC: 10 MMOL/L (ref 5–15)
APTT PPP: 28.7 SEC (ref 22.1–32)
AST SERPL-CCNC: 38 U/L (ref 15–37)
BASOPHILS # BLD: 0.1 K/UL (ref 0–0.1)
BASOPHILS NFR BLD: 1 % (ref 0–1)
BILIRUB SERPL-MCNC: 0.3 MG/DL (ref 0.2–1)
BUN SERPL-MCNC: 9 MG/DL (ref 6–20)
BUN/CREAT SERPL: 11 (ref 12–20)
CALCIUM SERPL-MCNC: 8.8 MG/DL (ref 8.5–10.1)
CHLORIDE SERPL-SCNC: 103 MMOL/L (ref 97–108)
CO2 SERPL-SCNC: 26 MMOL/L (ref 21–32)
CREAT SERPL-MCNC: 0.83 MG/DL (ref 0.55–1.02)
DIFFERENTIAL METHOD BLD: NORMAL
EOSINOPHIL # BLD: 0.4 K/UL (ref 0–0.4)
EOSINOPHIL NFR BLD: 4 % (ref 0–7)
ERYTHROCYTE [DISTWIDTH] IN BLOOD BY AUTOMATED COUNT: 13 % (ref 11.5–14.5)
GLOBULIN SER CALC-MCNC: 3.2 G/DL (ref 2–4)
GLUCOSE SERPL-MCNC: 116 MG/DL (ref 65–100)
HCT VFR BLD AUTO: 40.2 % (ref 35–47)
HGB BLD-MCNC: 13.4 G/DL (ref 11.5–16)
IMM GRANULOCYTES # BLD: 0 K/UL (ref 0–0.04)
IMM GRANULOCYTES NFR BLD AUTO: 0 % (ref 0–0.5)
INR PPP: 1 (ref 0.9–1.1)
LYMPHOCYTES # BLD: 1.6 K/UL (ref 0.8–3.5)
LYMPHOCYTES NFR BLD: 18 % (ref 12–49)
MCH RBC QN AUTO: 29.6 PG (ref 26–34)
MCHC RBC AUTO-ENTMCNC: 33.3 G/DL (ref 30–36.5)
MCV RBC AUTO: 88.9 FL (ref 80–99)
MONOCYTES # BLD: 0.6 K/UL (ref 0–1)
MONOCYTES NFR BLD: 6 % (ref 5–13)
NEUTS SEG # BLD: 6.7 K/UL (ref 1.8–8)
NEUTS SEG NFR BLD: 72 % (ref 32–75)
NRBC # BLD: 0 K/UL (ref 0–0.01)
NRBC BLD-RTO: 0 PER 100 WBC
PLATELET # BLD AUTO: 252 K/UL (ref 150–400)
PMV BLD AUTO: 10.2 FL (ref 8.9–12.9)
POTASSIUM SERPL-SCNC: 3.6 MMOL/L (ref 3.5–5.1)
PROT SERPL-MCNC: 7.2 G/DL (ref 6.4–8.2)
PROTHROMBIN TIME: 10.3 SEC (ref 9–11.1)
RBC # BLD AUTO: 4.52 M/UL (ref 3.8–5.2)
SODIUM SERPL-SCNC: 139 MMOL/L (ref 136–145)
THERAPEUTIC RANGE,PTTT: NORMAL SECS (ref 58–77)
WBC # BLD AUTO: 9.3 K/UL (ref 3.6–11)

## 2018-10-14 PROCEDURE — 85730 THROMBOPLASTIN TIME PARTIAL: CPT | Performed by: EMERGENCY MEDICINE

## 2018-10-14 PROCEDURE — 85610 PROTHROMBIN TIME: CPT | Performed by: EMERGENCY MEDICINE

## 2018-10-14 PROCEDURE — 96360 HYDRATION IV INFUSION INIT: CPT

## 2018-10-14 PROCEDURE — 85025 COMPLETE CBC W/AUTO DIFF WBC: CPT | Performed by: EMERGENCY MEDICINE

## 2018-10-14 PROCEDURE — 99284 EMERGENCY DEPT VISIT MOD MDM: CPT

## 2018-10-14 PROCEDURE — 74011250636 HC RX REV CODE- 250/636: Performed by: EMERGENCY MEDICINE

## 2018-10-14 PROCEDURE — 74011250636 HC RX REV CODE- 250/636: Performed by: NEUROLOGICAL SURGERY

## 2018-10-14 PROCEDURE — 74011250637 HC RX REV CODE- 250/637: Performed by: NEUROLOGICAL SURGERY

## 2018-10-14 PROCEDURE — 80053 COMPREHEN METABOLIC PANEL: CPT | Performed by: EMERGENCY MEDICINE

## 2018-10-14 PROCEDURE — 65270000029 HC RM PRIVATE

## 2018-10-14 PROCEDURE — 36415 COLL VENOUS BLD VENIPUNCTURE: CPT | Performed by: EMERGENCY MEDICINE

## 2018-10-14 RX ORDER — OXYCODONE AND ACETAMINOPHEN 5; 325 MG/1; MG/1
1-2 TABLET ORAL
Status: DISCONTINUED | OUTPATIENT
Start: 2018-10-14 | End: 2018-10-19

## 2018-10-14 RX ORDER — DEXTROSE, SODIUM CHLORIDE, AND POTASSIUM CHLORIDE 5; .45; .15 G/100ML; G/100ML; G/100ML
75 INJECTION INTRAVENOUS CONTINUOUS
Status: DISCONTINUED | OUTPATIENT
Start: 2018-10-14 | End: 2018-10-17

## 2018-10-14 RX ADMIN — DEXTROSE MONOHYDRATE, SODIUM CHLORIDE, AND POTASSIUM CHLORIDE 75 ML/HR: 50; 4.5; 1.49 INJECTION, SOLUTION INTRAVENOUS at 23:33

## 2018-10-14 RX ADMIN — OXYCODONE AND ACETAMINOPHEN 1 TABLET: 5; 325 TABLET ORAL at 23:38

## 2018-10-14 RX ADMIN — SODIUM CHLORIDE 1000 ML: 900 INJECTION, SOLUTION INTRAVENOUS at 20:10

## 2018-10-14 NOTE — ED TRIAGE NOTES
Pt arrives via ems from home. Pt reports recent spinal surgery. pt currently wearing a back brace. Patches were applied for leaking spinal fluid. Pt c/o extreme back pain when sitting up, headache, and high blood pressure. EMS reports 178/83 RA 98, 18 R, 103 HR, A&Ox4. [facility]

## 2018-10-14 NOTE — ED NOTES
Pt reports falling out of bed when log rolling to get up on Wednesday morning. Pt states the pain and headaches started Friday.

## 2018-10-15 ENCOUNTER — APPOINTMENT (OUTPATIENT)
Dept: MRI IMAGING | Age: 58
DRG: 030 | End: 2018-10-15
Attending: NEUROLOGICAL SURGERY
Payer: COMMERCIAL

## 2018-10-15 PROCEDURE — 72158 MRI LUMBAR SPINE W/O & W/DYE: CPT

## 2018-10-15 PROCEDURE — 74011250636 HC RX REV CODE- 250/636: Performed by: NEUROLOGICAL SURGERY

## 2018-10-15 PROCEDURE — 74011250637 HC RX REV CODE- 250/637: Performed by: NEUROLOGICAL SURGERY

## 2018-10-15 PROCEDURE — 65270000029 HC RM PRIVATE

## 2018-10-15 PROCEDURE — A9575 INJ GADOTERATE MEGLUMI 0.1ML: HCPCS | Performed by: NEUROLOGICAL SURGERY

## 2018-10-15 RX ORDER — GADOTERATE MEGLUMINE 376.9 MG/ML
15 INJECTION INTRAVENOUS
Status: COMPLETED | OUTPATIENT
Start: 2018-10-15 | End: 2018-10-15

## 2018-10-15 RX ORDER — ATORVASTATIN CALCIUM 40 MG/1
40 TABLET, FILM COATED ORAL
Status: DISCONTINUED | OUTPATIENT
Start: 2018-10-15 | End: 2018-10-20 | Stop reason: HOSPADM

## 2018-10-15 RX ORDER — VENLAFAXINE HYDROCHLORIDE 150 MG/1
150 CAPSULE, EXTENDED RELEASE ORAL DAILY
Status: DISCONTINUED | OUTPATIENT
Start: 2018-10-15 | End: 2018-10-20 | Stop reason: HOSPADM

## 2018-10-15 RX ORDER — LEVOTHYROXINE SODIUM 100 UG/1
100 TABLET ORAL
Status: DISCONTINUED | OUTPATIENT
Start: 2018-10-15 | End: 2018-10-20 | Stop reason: HOSPADM

## 2018-10-15 RX ORDER — VENLAFAXINE HYDROCHLORIDE 150 MG/1
150 CAPSULE, EXTENDED RELEASE ORAL DAILY
COMMUNITY
Start: 2018-08-31

## 2018-10-15 RX ORDER — CETIRIZINE HCL 10 MG
10 TABLET ORAL DAILY
Status: DISCONTINUED | OUTPATIENT
Start: 2018-10-15 | End: 2018-10-20 | Stop reason: HOSPADM

## 2018-10-15 RX ORDER — ZOLPIDEM TARTRATE 5 MG/1
5 TABLET ORAL
Status: DISCONTINUED | OUTPATIENT
Start: 2018-10-15 | End: 2018-10-20 | Stop reason: HOSPADM

## 2018-10-15 RX ORDER — LEVOTHYROXINE SODIUM 50 UG/1
50 TABLET ORAL
Status: DISCONTINUED | OUTPATIENT
Start: 2018-10-21 | End: 2018-10-20 | Stop reason: HOSPADM

## 2018-10-15 RX ORDER — POTASSIUM CHLORIDE 750 MG/1
10 TABLET, FILM COATED, EXTENDED RELEASE ORAL DAILY
Status: DISCONTINUED | OUTPATIENT
Start: 2018-10-15 | End: 2018-10-20 | Stop reason: HOSPADM

## 2018-10-15 RX ORDER — ALBUTEROL SULFATE 90 UG/1
1 AEROSOL, METERED RESPIRATORY (INHALATION)
Status: DISCONTINUED | OUTPATIENT
Start: 2018-10-15 | End: 2018-10-15 | Stop reason: CLARIF

## 2018-10-15 RX ORDER — PANTOPRAZOLE SODIUM 40 MG/1
40 TABLET, DELAYED RELEASE ORAL
Status: DISCONTINUED | OUTPATIENT
Start: 2018-10-16 | End: 2018-10-20 | Stop reason: HOSPADM

## 2018-10-15 RX ORDER — GABAPENTIN 300 MG/1
300 CAPSULE ORAL 2 TIMES DAILY
Status: DISCONTINUED | OUTPATIENT
Start: 2018-10-15 | End: 2018-10-20 | Stop reason: HOSPADM

## 2018-10-15 RX ORDER — ALBUTEROL SULFATE 0.83 MG/ML
1.25 SOLUTION RESPIRATORY (INHALATION)
Status: DISCONTINUED | OUTPATIENT
Start: 2018-10-15 | End: 2018-10-20 | Stop reason: HOSPADM

## 2018-10-15 RX ORDER — ESTRADIOL 1 MG/1
0.5 TABLET ORAL DAILY
Status: DISCONTINUED | OUTPATIENT
Start: 2018-10-15 | End: 2018-10-20 | Stop reason: HOSPADM

## 2018-10-15 RX ORDER — OMEPRAZOLE 20 MG/1
20 CAPSULE, DELAYED RELEASE ORAL DAILY
Status: DISCONTINUED | OUTPATIENT
Start: 2018-10-15 | End: 2018-10-15 | Stop reason: CLARIF

## 2018-10-15 RX ORDER — CYCLOBENZAPRINE HCL 10 MG
10 TABLET ORAL
Status: DISCONTINUED | OUTPATIENT
Start: 2018-10-15 | End: 2018-10-20 | Stop reason: HOSPADM

## 2018-10-15 RX ADMIN — GADOTERATE MEGLUMINE 15 ML: 376.9 INJECTION INTRAVENOUS at 08:00

## 2018-10-15 RX ADMIN — CETIRIZINE HYDROCHLORIDE 10 MG: 10 TABLET, FILM COATED ORAL at 11:37

## 2018-10-15 RX ADMIN — GABAPENTIN 300 MG: 300 CAPSULE ORAL at 21:10

## 2018-10-15 RX ADMIN — LOSARTAN POTASSIUM: 50 TABLET, FILM COATED ORAL at 11:37

## 2018-10-15 RX ADMIN — GADOTERATE MEGLUMINE 15 ML: 376.9 INJECTION INTRAVENOUS at 07:00

## 2018-10-15 RX ADMIN — POTASSIUM CHLORIDE 10 MEQ: 750 TABLET, FILM COATED, EXTENDED RELEASE ORAL at 11:35

## 2018-10-15 RX ADMIN — VENLAFAXINE HYDROCHLORIDE 150 MG: 150 CAPSULE, EXTENDED RELEASE ORAL at 11:37

## 2018-10-15 RX ADMIN — OXYCODONE AND ACETAMINOPHEN 1 TABLET: 5; 325 TABLET ORAL at 12:56

## 2018-10-15 RX ADMIN — GABAPENTIN 300 MG: 300 CAPSULE ORAL at 11:37

## 2018-10-15 RX ADMIN — OXYCODONE AND ACETAMINOPHEN 1 TABLET: 5; 325 TABLET ORAL at 18:08

## 2018-10-15 RX ADMIN — ESTRADIOL 0.5 MG: 1 TABLET ORAL at 11:36

## 2018-10-15 RX ADMIN — LEVOTHYROXINE SODIUM 100 MCG: 100 TABLET ORAL at 11:53

## 2018-10-15 RX ADMIN — OXYCODONE AND ACETAMINOPHEN 1 TABLET: 5; 325 TABLET ORAL at 23:40

## 2018-10-15 RX ADMIN — ZOLPIDEM TARTRATE 5 MG: 5 TABLET ORAL at 22:50

## 2018-10-15 RX ADMIN — OXYCODONE AND ACETAMINOPHEN 1 TABLET: 5; 325 TABLET ORAL at 07:06

## 2018-10-15 RX ADMIN — ATORVASTATIN CALCIUM 40 MG: 40 TABLET, FILM COATED ORAL at 21:10

## 2018-10-15 NOTE — H&P
[address] HISTORY AND PHYSICAL Name:CANDELARIO ZAPATA 
MR#: [id number] : 1960 ACCOUNT #: [de-identified] ADMIT DATE: 10/14/2018 REASON FOR ADMISSION:  Spinal fluid leak. HISTORY OF PRESENT ILLNESS:  This is a 60-year-old woman who underwent L3-4 lumbar fusion with exploration of previous L4-S1 fusion on 2018 by myself. She did have an incidental durotomy during surgery, which was repaired primarily. She did very well postoperatively. She did not have any signs of CSF leak and had been doing well at home. Two nights ago, she had an incident when she fell out of bed. She said she landed quite hard on the floor. Forty-eight hours later, she started having positional headaches. She says as long as she is lying flat, she does not have any headache. As soon as she sits up or tries to stand, she has a severe pounding headache in the front of her head. Over the weekend, she tried to lie flat, rested, took plenty of fluids, but continued to have severe positional headaches. She was not complaining of any back pain and had no radicular symptoms, no bowel or bladder dysfunction. She has not had any drainage from her wound. I asked her to be admitted to the hospital.  We had an MRI performed overnight that revealed a large fluid collection posterior to the dural sac. PAST MEDICAL HISTORY:  Thyroid disorder, GERD, hypercholesterolemia, degenerative disk disease. ALLERGIES:  LIDOCAINE  (NOT MARCAINE), LEVAQUIN. FAMILY HISTORY:  Positive for diabetes, coronary artery disease, hypertension. SOCIAL HISTORY:  She is single. Occasional alcohol use, no tobacco use. 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 PAST SURGICAL HISTORY:  Lumbar fusion as previously mentioned on 2018, previous lumbar fusion in , previous lumbar fusion approximately 20 years ago, ACDF, breast biopsy. MEDICATIONS PRIOR TO ADMISSION:  Albuterol inhaler, Lipitor 40 mg p.o. at bedtime, Zyrtec 10 mg p.o. daily, Flexeril 10 mg p.o. t.i.d. p.r.n., Estrace 0.5 mg daily, Neurontin 300 mg p.o. b.i.d., Synthroid 100 mcg p.o. daily, Synthroid 50 mcg p.o. daily Sundays only, losartan/hydrochlorothiazide 100/25 mg p.o. daily, omeprazole 20 mg p.o. daily, Percocet 5/325 one tablet p.o. q.4 hours p.r.n. pain, potassium chloride 10 mEq daily, venlafaxine 150 mg p.o. daily, Accolate 20 mg p.o. b.i.d., Ambien 12.5 mg p.o. at bedtime p.r.n. REVIEW OF SYSTEMS:  Positive for headaches. No vision changes, hearing difficulty, chest pain, shortness of breath, abdominal pain, urinary dysfunction, numbness, muscle spasm, skin eruption. PHYSICAL EXAMINATION:  Temperature 98.9, blood pressure 127/72, pulse 74. This is a well-developed, well-nourished woman who is examined lying in the hospital bed. She is alert. She is oriented x3, normal affect, fluent speech. Conjugate gaze, symmetric face. Full strength in bilateral upper extremities, deltoids, biceps, triceps, wrist extensors, hand intrinsics. Full strength in bilateral lower extremities, hip flexors, quadriceps, tibialis anterior, gastrocnemius, EHL. Her incision is well healed. There is no palpable fluid collection beneath it. There is no erythema. IMAGING STUDIES:  She has an MRI of the lumbar spine that was performed yesterday evening. This shows a large fluid collection, 7.5 x 11.6 x 3.4 cm, posterior to the dural sac. There is no evidence of abnormal enhancement. ASSESSMENT AND PLAN:  This is a 49-year-old woman who underwent a lumbar fusion on 09/18/2018. She was doing well up until several days ago when she had fall out of bed. I believe she now has a cerebrospinal fluid leak. Given her history and the MRI findings, we discussed further treatment options.   Given that this is a large fluid collection and she is very symptomatic, I think the best course is surgical exploration with possible placement of a lumbar drain. I have discussed the risks and benefits with her. She understands these and has agreed to proceed. MD SHERICE Raymundo/ALIVIA 
D: 10/15/2018 09:49    
T: 10/15/2018 10:23 
JOB #: [id number]

## 2018-10-15 NOTE — PROGRESS NOTES
Admission Medication Reconciliation: 
 
Information obtained from:  Patient/RxQuery Comments/Recommendations: Updated PTA meds/reviewed patient's allergies. 1)  Patient was alert during interview and seemed to be a good historian. She noted minor changes in the PTA med list since her last discharge from [facility] 
 
2)  Medication changes (since last review): Adjusted 
-Zyretic (was 10mg nightly, patient takes daily in AM) Removed -Miralax 3) Synthroid  
-Takes 100 mcg Monday through Saturday 
-Takes 50 mcg on Sundays 4) Patient took her morning medications today. Last reported doses are noted  below. Allergies:  Levaquin [levofloxacin]; Lidocaine; and Promethazine Significant PMH/Disease States:  
Past Medical History:  
Diagnosis Date  Arthritis DDD  Asthma RESCUE INHALER WHEN NEEDED  Chronic pain CHRONIC BACK AND NECK PAIN  
 GERD (gastroesophageal reflux disease)  Hyperlipemia  Hypertension  Osteopenia  Other ill-defined conditions(799.89) HX: PYELONEPHRITIS AND STONES  
 PUD (peptic ulcer disease)  Sleep apnea USES CPAP  Thyroid disease HYPOTHYROID Chief Complaint for this Admission: Chief Complaint Patient presents with  Post-Op Problem Prior to Admission Medications:  
Prior to Admission Medications Prescriptions Last Dose Informant Patient Reported? Taking? Cetirizine (ZYRTEC) 10 mg Cap 10/14/2018 at Unknown time  Yes Yes Sig: Take 10 mg by mouth daily. Venlafaxine 150 mg tr24 10/13/2018 at Unknown time  Yes Yes Sig: Take 150 mg by mouth daily. albuterol (PROVENTIL HFA, VENTOLIN HFA, PROAIR HFA) 90 mcg/actuation inhaler   Yes Yes Sig: Take 1 Puff by inhalation every four (4) hours as needed for Wheezing. atorvastatin (LIPITOR) 40 mg tablet 10/13/2018 at Unknown time  Yes Yes Sig: Take 40 mg by mouth nightly. cyclobenzaprine (FLEXERIL) 10 mg tablet   Yes Yes Sig: Take 10 mg by mouth three (3) times daily as needed for Muscle Spasm(s). estradiol (ESTRACE) 0.5 mg tablet 10/14/2018 at Unknown time  Yes Yes Sig: Take 0.5 mg by mouth daily. gabapentin (NEURONTIN) 300 mg capsule 10/14/2018 at Unknown time  Yes Yes Sig: Take 300 mg by mouth two (2) times a day. levothyroxine (SYNTHROID) 100 mcg tablet 10/13/2018 at Unknown time  Yes Yes Sig: Take 100 mcg by mouth Daily (before breakfast). Indications: mon through sat  
levothyroxine (SYNTHROID) 50 mcg tablet 10/14/2018 at Unknown time  Yes Yes Sig: Take 50 mcg by mouth Daily (before breakfast). Indications: sunday only  
losartan-hydroCHLOROthiazide (HYZAAR) 100-25 mg per tablet 10/14/2018 at Unknown time  Yes Yes Sig: Take 1 Tab by mouth daily. omeprazole (PRILOSEC) 20 mg capsule 10/14/2018 at Unknown time  Yes Yes Sig: Take 20 mg by mouth daily. oxyCODONE-acetaminophen (PERCOCET) 5-325 mg per tablet   Yes Yes Sig: Take 1 Tab by mouth every four (4) hours as needed for Pain.  
potassium chloride (KLOR-CON) 10 mEq tablet 10/14/2018 at Unknown time  Yes Yes Sig: Take 10 mEq by mouth daily. zafirlukast (ACCOLATE) 20 mg tablet 10/14/2018 at Unknown time  Yes Yes Sig: Take 20 mg by mouth two (2) times a day. zolpidem CR (AMBIEN CR) 12.5 mg tablet   Yes Yes Sig: Take 12.5 mg by mouth nightly as needed for Sleep. Facility-Administered Medications: None

## 2018-10-15 NOTE — PROGRESS NOTES
MRI was called in regards to the STAT MRI that was ordered for 2000 8/14/18. RN could not get a hold of a staff member. 9601- MRI was called. MRI to be done this AM. 5941- left for MRI

## 2018-10-15 NOTE — ROUTINE PROCESS
TRANSFER - OUT REPORT: 
 
Verbal report given to Panchito Limon RN(name) on Desire Burn  being transferred to Spine(unit) for routine progression of care Report consisted of patients Situation, Background, Assessment and  
Recommendations(SBAR). Information from the following report(s) SBAR, Kardex, Intake/Output, MAR and Recent Results was reviewed with the receiving nurse. Lines:  
Peripheral IV 10/14/18 Left Forearm (Active) Site Assessment Clean, dry, & intact 10/14/2018  8:11 PM  
Phlebitis Assessment 0 10/14/2018  8:11 PM  
Infiltration Assessment 0 10/14/2018  8:11 PM  
Dressing Status Clean, dry, & intact 10/14/2018  8:11 PM  
Hub Color/Line Status Pink;Capped;Flushed;Patent 10/14/2018  8:11 PM  
Action Taken Blood drawn 10/14/2018  8:11 PM  
  
 
Opportunity for questions and clarification was provided.

## 2018-10-15 NOTE — PROGRESS NOTES
TRANSFER - IN REPORT: 
 
Verbal report received from TAHMINA jacobs(name) on Derek Diane  being received from ED(unit) for routine progression of care Report consisted of patients Situation, Background, Assessment and  
Recommendations(SBAR). Information from the following report(s) SBAR, Kardex, Procedure Summary, Intake/Output, MAR, Accordion and Recent Results was reviewed with the receiving nurse. Opportunity for questions and clarification was provided. Assessment completed upon patients arrival to unit and care assumed. Primary Nurse Bk Wills RN and RN, RN performed a dual skin assessment on this patient No impairment noted other than surgical scar to back Navdeep score is 20

## 2018-10-15 NOTE — ED PROVIDER NOTES
HPI  
 
  63y F here with PERKINS. About 3 weeks ago she had spinal fusion surgery in the lumbar spine. During surgery there was a dural tear which was repaired. She was kept in the hospital a few extra days and was doing well. Overall recovery has gone quite well for her until 4 days ago when she had an accidental fall out of bed. About 2 days after this, she started to have bad HA's that are positional in nature. If she is flat, there is no HA. If she sits up, she starts to have a bad HA and some associated dizziness. She has a hx of migraine HA's and this does not feel the same to her. No fever. No drainage from her incision site. No vomiting. Taking PO well. No focal numbness or weakness. No neck stiffness. No rash. She spoke with her neurosurgeon tonight who is aware she is coming to the ED. Past Medical History:  
Diagnosis Date  Arthritis DDD  Asthma RESCUE INHALER WHEN NEEDED  Chronic pain CHRONIC BACK AND NECK PAIN  
 GERD (gastroesophageal reflux disease)  Hyperlipemia  Hypertension  Osteopenia  Other ill-defined conditions(079.01) HX: PYELONEPHRITIS AND STONES  
 PUD (peptic ulcer disease)  Sleep apnea USES CPAP  Thyroid disease HYPOTHYROID Past Surgical History:  
Procedure Later[facility] Date [facility] BENIGN MASS RIGHT BREAST  HX BREAST BIOPSY Right 2003 Stereotactic  HX BREAST BIOPSY Right 2005 Surgical excision x2  HX BREAST REDUCTION Bilateral 2004  HX GI    
 COLONOSCOPY/ EGD  HX GI  1980s choleysystectomy  HX GYN  2001  
 hysterectomy  HX HEENT    
 WISDOM TEETH  
 HX OPEN CHOLECYSTECTOMY  HX ORTHOPAEDIC    
 C4, C5, L4, L5, S1 FUSIONS  NEUROLOGICAL PROCEDURE UNLISTED C4, C5, L4, L5, S1 FUSIONS Family History:  
Problem Relation Age of Onset  Diabetes Mother TYPE II  
 Hypertension Mother  Breast Cancer Mother 78  Arthritis Mother  Hypertension Father  Coronary Artery Disease Father  Hypertension Brother  Diabetes Maternal Grandmother  Hypertension Maternal Grandmother  Hypertension Maternal Grandfather  Cancer Maternal Grandfather COLON CA  
 Hypertension Paternal Grandmother  Hypertension Paternal Grandfather  Hypertension Brother  Hypertension Brother Social History Social History  Marital status: SINGLE Spouse name: N/A  
 Number of children: N/A  
 Years of education: N/A Occupational History  Not on file. Social History Main Topics  Smoking status: Never Smoker  Smokeless tobacco: Never Used  Alcohol use Yes Comment: OCCASIONAL   
 Drug use: No  
 Sexual activity: Not on file Other Topics Concern  Not on file Social History Narrative ALLERGIES: Levaquin [levofloxacin]; Lidocaine; and Promethazine Review of Systems Review of Systems Constitutional: (-) weight loss. HEENT: (-) stiff neck Eyes: (-) discharge. Respiratory: (-) cough. Cardiovascular: (-) syncope. Gastrointestinal: (-) blood in stool. Genitourinary: (-) hematuria. Musculoskeletal: (-) myalgias. Neurological: (-) seizure. Skin: (-) petechiae Lymph/Immunologic: (-) enlarged lymph nodes All other systems reviewed and are negative. Vitals:  
 10/14/18 1849 BP: 178/89 Pulse: 100 Resp: 18 Temp: 98 °F (36.7 °C) SpO2: 98% Weight: 82.9 kg (182 lb 12.2 oz) Height: 5' 4\" (1.626 m) Physical Exam Nursing note and vitals reviewed. Constitutional: oriented to person, place, and time. appears well-developed and well-nourished. No distress. Head: Normocephalic and atraumatic. Sclera anicteric Nose: No rhinorrhea Mouth/Throat: Oropharynx is clear and moist. Pharynx normal 
Eyes: Conjunctivae are normal. Pupils are equal, round, and reactive to light. Right eye exhibits no discharge. Left eye exhibits no discharge. Neck: Painless normal range of motion. Neck supple. No LAD. Cardiovascular: Normal rate, regular rhythm, normal heart sounds and intact distal pulses. Exam reveals no gallop and no friction rub. No murmur heard. Pulmonary/Chest:  No respiratory distress. No wheezes. No rales. No rhonchi. No increased work of breathing. No accessory muscle use. Good air exchange throughout. Abdominal: soft, non-tender, no rebound or guarding. No hepatosplenomegaly. Normal bowel sounds throughout. Back: no tenderness to palpation, no deformities, no CVA tenderness. Incision site is c/d/i. Extremities/Musculoskeletal: Normal range of motion. no tenderness. No edema. Distal extremities are neurovasc intact. Lymphadenopathy:   No adenopathy. Neurological:  Alert and oriented to person, place, and time. Coordination normal. CN 2-12 intact. Motor and sensory function intact. Skin: Skin is warm and dry. No rash noted. No pallor. MDM 63y F here with PERKINS's. Sounds like a spinal HA. Potentially injured where she had a dural tear. I spoke with her neurosurgeon who is going to admit and do further testing to determine the etiology of her HA's. ED Course Procedures

## 2018-10-16 ENCOUNTER — ANESTHESIA (OUTPATIENT)
Dept: SURGERY | Age: 58
DRG: 030 | End: 2018-10-16
Payer: COMMERCIAL

## 2018-10-16 ENCOUNTER — ANESTHESIA EVENT (OUTPATIENT)
Dept: SURGERY | Age: 58
DRG: 030 | End: 2018-10-16
Payer: COMMERCIAL

## 2018-10-16 ENCOUNTER — APPOINTMENT (OUTPATIENT)
Dept: GENERAL RADIOLOGY | Age: 58
DRG: 030 | End: 2018-10-16
Attending: NEUROLOGICAL SURGERY
Payer: COMMERCIAL

## 2018-10-16 PROCEDURE — 77030002933 HC SUT MCRYL J&J -A: Performed by: NEUROLOGICAL SURGERY

## 2018-10-16 PROCEDURE — 77030012035 HC APPL SEAL MICROMYST KT INLC -C: Performed by: NEUROLOGICAL SURGERY

## 2018-10-16 PROCEDURE — 74011000250 HC RX REV CODE- 250

## 2018-10-16 PROCEDURE — 76001 XR FLUOROSCOPY OVER 60 MINUTES: CPT

## 2018-10-16 PROCEDURE — 74011250636 HC RX REV CODE- 250/636

## 2018-10-16 PROCEDURE — 65660000000 HC RM CCU STEPDOWN

## 2018-10-16 PROCEDURE — 77030032490 HC SLV COMPR SCD KNE COVD -B: Performed by: NEUROLOGICAL SURGERY

## 2018-10-16 PROCEDURE — 77030014647 HC SEAL FBRN TISSL BAXT -D: Performed by: NEUROLOGICAL SURGERY

## 2018-10-16 PROCEDURE — 77030029099 HC BN WAX SSPC -A: Performed by: NEUROLOGICAL SURGERY

## 2018-10-16 PROCEDURE — 77030034850: Performed by: NEUROLOGICAL SURGERY

## 2018-10-16 PROCEDURE — 77030010813: Performed by: NEUROLOGICAL SURGERY

## 2018-10-16 PROCEDURE — 76210000017 HC OR PH I REC 1.5 TO 2 HR: Performed by: NEUROLOGICAL SURGERY

## 2018-10-16 PROCEDURE — 009T00Z DRAINAGE OF SPINAL MENINGES WITH DRAINAGE DEVICE, OPEN APPROACH: ICD-10-PCS | Performed by: NEUROLOGICAL SURGERY

## 2018-10-16 PROCEDURE — 77030019908 HC STETH ESOPH SIMS -A: Performed by: NURSE ANESTHETIST, CERTIFIED REGISTERED

## 2018-10-16 PROCEDURE — 77030008684 HC TU ET CUF COVD -B: Performed by: NURSE ANESTHETIST, CERTIFIED REGISTERED

## 2018-10-16 PROCEDURE — 76060000037 HC ANESTHESIA 3 TO 3.5 HR: Performed by: NEUROLOGICAL SURGERY

## 2018-10-16 PROCEDURE — 77030003029 HC SUT VCRL J&J -B: Performed by: NEUROLOGICAL SURGERY

## 2018-10-16 PROCEDURE — 74011000272 HC RX REV CODE- 272: Performed by: NEUROLOGICAL SURGERY

## 2018-10-16 PROCEDURE — 77030004826 HC CATH CSF INLC -C: Performed by: NEUROLOGICAL SURGERY

## 2018-10-16 PROCEDURE — 00NY0ZZ RELEASE LUMBAR SPINAL CORD, OPEN APPROACH: ICD-10-PCS | Performed by: NEUROLOGICAL SURGERY

## 2018-10-16 PROCEDURE — 74011000250 HC RX REV CODE- 250: Performed by: NEUROLOGICAL SURGERY

## 2018-10-16 PROCEDURE — 74011250636 HC RX REV CODE- 250/636: Performed by: ANESTHESIOLOGY

## 2018-10-16 PROCEDURE — 76010000172 HC OR TIME 2.5 TO 3 HR INTENSV-TIER 1: Performed by: NEUROLOGICAL SURGERY

## 2018-10-16 PROCEDURE — 77030013951 HC SEAL TISS ADH DURASL KT INLC -G1: Performed by: NEUROLOGICAL SURGERY

## 2018-10-16 PROCEDURE — 74011250637 HC RX REV CODE- 250/637: Performed by: NEUROLOGICAL SURGERY

## 2018-10-16 PROCEDURE — 77030013079 HC BLNKT BAIR HGGR 3M -A: Performed by: NURSE ANESTHETIST, CERTIFIED REGISTERED

## 2018-10-16 PROCEDURE — 77030026438 HC STYL ET INTUB CARD -A: Performed by: NURSE ANESTHETIST, CERTIFIED REGISTERED

## 2018-10-16 PROCEDURE — 74011250636 HC RX REV CODE- 250/636: Performed by: NEUROLOGICAL SURGERY

## 2018-10-16 PROCEDURE — 77030004391 HC BUR FLUT MEDT -C: Performed by: NEUROLOGICAL SURGERY

## 2018-10-16 PROCEDURE — 77030038600 HC TU BPLR IRR DISP STRY -B: Performed by: NEUROLOGICAL SURGERY

## 2018-10-16 RX ORDER — NALOXONE HYDROCHLORIDE 0.4 MG/ML
0.4 INJECTION, SOLUTION INTRAMUSCULAR; INTRAVENOUS; SUBCUTANEOUS AS NEEDED
Status: DISCONTINUED | OUTPATIENT
Start: 2018-10-16 | End: 2018-10-20 | Stop reason: HOSPADM

## 2018-10-16 RX ORDER — SODIUM CHLORIDE 0.9 % (FLUSH) 0.9 %
5-10 SYRINGE (ML) INJECTION AS NEEDED
Status: DISCONTINUED | OUTPATIENT
Start: 2018-10-16 | End: 2018-10-16 | Stop reason: HOSPADM

## 2018-10-16 RX ORDER — SODIUM CHLORIDE, SODIUM LACTATE, POTASSIUM CHLORIDE, CALCIUM CHLORIDE 600; 310; 30; 20 MG/100ML; MG/100ML; MG/100ML; MG/100ML
75 INJECTION, SOLUTION INTRAVENOUS CONTINUOUS
Status: DISCONTINUED | OUTPATIENT
Start: 2018-10-16 | End: 2018-10-16 | Stop reason: HOSPADM

## 2018-10-16 RX ORDER — LIDOCAINE HYDROCHLORIDE 10 MG/ML
0.1 INJECTION, SOLUTION EPIDURAL; INFILTRATION; INTRACAUDAL; PERINEURAL AS NEEDED
Status: DISCONTINUED | OUTPATIENT
Start: 2018-10-16 | End: 2018-10-16 | Stop reason: HOSPADM

## 2018-10-16 RX ORDER — SUCCINYLCHOLINE CHLORIDE 20 MG/ML
INJECTION INTRAMUSCULAR; INTRAVENOUS AS NEEDED
Status: DISCONTINUED | OUTPATIENT
Start: 2018-10-16 | End: 2018-10-16 | Stop reason: HOSPADM

## 2018-10-16 RX ORDER — CEFAZOLIN SODIUM 1 G/3ML
INJECTION, POWDER, FOR SOLUTION INTRAMUSCULAR; INTRAVENOUS AS NEEDED
Status: DISCONTINUED | OUTPATIENT
Start: 2018-10-16 | End: 2018-10-16 | Stop reason: HOSPADM

## 2018-10-16 RX ORDER — FENTANYL CITRATE 50 UG/ML
INJECTION, SOLUTION INTRAMUSCULAR; INTRAVENOUS AS NEEDED
Status: DISCONTINUED | OUTPATIENT
Start: 2018-10-16 | End: 2018-10-16 | Stop reason: HOSPADM

## 2018-10-16 RX ORDER — MIDAZOLAM HYDROCHLORIDE 1 MG/ML
1 INJECTION, SOLUTION INTRAMUSCULAR; INTRAVENOUS AS NEEDED
Status: DISCONTINUED | OUTPATIENT
Start: 2018-10-16 | End: 2018-10-16 | Stop reason: HOSPADM

## 2018-10-16 RX ORDER — ONDANSETRON 2 MG/ML
4 INJECTION INTRAMUSCULAR; INTRAVENOUS AS NEEDED
Status: DISCONTINUED | OUTPATIENT
Start: 2018-10-16 | End: 2018-10-16 | Stop reason: HOSPADM

## 2018-10-16 RX ORDER — SODIUM CHLORIDE 0.9 % (FLUSH) 0.9 %
5-10 SYRINGE (ML) INJECTION EVERY 8 HOURS
Status: DISCONTINUED | OUTPATIENT
Start: 2018-10-16 | End: 2018-10-16 | Stop reason: HOSPADM

## 2018-10-16 RX ORDER — SODIUM CHLORIDE, SODIUM LACTATE, POTASSIUM CHLORIDE, CALCIUM CHLORIDE 600; 310; 30; 20 MG/100ML; MG/100ML; MG/100ML; MG/100ML
INJECTION, SOLUTION INTRAVENOUS
Status: DISCONTINUED | OUTPATIENT
Start: 2018-10-16 | End: 2018-10-16 | Stop reason: HOSPADM

## 2018-10-16 RX ORDER — ROCURONIUM BROMIDE 10 MG/ML
INJECTION, SOLUTION INTRAVENOUS AS NEEDED
Status: DISCONTINUED | OUTPATIENT
Start: 2018-10-16 | End: 2018-10-16 | Stop reason: HOSPADM

## 2018-10-16 RX ORDER — NEOSTIGMINE METHYLSULFATE 1 MG/ML
INJECTION INTRAVENOUS AS NEEDED
Status: DISCONTINUED | OUTPATIENT
Start: 2018-10-16 | End: 2018-10-16 | Stop reason: HOSPADM

## 2018-10-16 RX ORDER — FENTANYL CITRATE 50 UG/ML
25 INJECTION, SOLUTION INTRAMUSCULAR; INTRAVENOUS
Status: DISCONTINUED | OUTPATIENT
Start: 2018-10-16 | End: 2018-10-16 | Stop reason: HOSPADM

## 2018-10-16 RX ORDER — ACETAMINOPHEN 10 MG/ML
INJECTION, SOLUTION INTRAVENOUS AS NEEDED
Status: DISCONTINUED | OUTPATIENT
Start: 2018-10-16 | End: 2018-10-16 | Stop reason: HOSPADM

## 2018-10-16 RX ORDER — DIPHENHYDRAMINE HYDROCHLORIDE 50 MG/ML
12.5 INJECTION, SOLUTION INTRAMUSCULAR; INTRAVENOUS AS NEEDED
Status: DISCONTINUED | OUTPATIENT
Start: 2018-10-16 | End: 2018-10-16 | Stop reason: HOSPADM

## 2018-10-16 RX ORDER — PROPOFOL 10 MG/ML
INJECTION, EMULSION INTRAVENOUS AS NEEDED
Status: DISCONTINUED | OUTPATIENT
Start: 2018-10-16 | End: 2018-10-16 | Stop reason: HOSPADM

## 2018-10-16 RX ORDER — MIDAZOLAM HYDROCHLORIDE 1 MG/ML
INJECTION, SOLUTION INTRAMUSCULAR; INTRAVENOUS AS NEEDED
Status: DISCONTINUED | OUTPATIENT
Start: 2018-10-16 | End: 2018-10-16 | Stop reason: HOSPADM

## 2018-10-16 RX ORDER — GLYCOPYRROLATE 0.2 MG/ML
INJECTION INTRAMUSCULAR; INTRAVENOUS AS NEEDED
Status: DISCONTINUED | OUTPATIENT
Start: 2018-10-16 | End: 2018-10-16 | Stop reason: HOSPADM

## 2018-10-16 RX ORDER — SODIUM CHLORIDE 0.9 % (FLUSH) 0.9 %
5-10 SYRINGE (ML) INJECTION EVERY 8 HOURS
Status: DISCONTINUED | OUTPATIENT
Start: 2018-10-17 | End: 2018-10-20 | Stop reason: HOSPADM

## 2018-10-16 RX ORDER — DIPHENHYDRAMINE HCL 25 MG
25 CAPSULE ORAL
Status: DISCONTINUED | OUTPATIENT
Start: 2018-10-16 | End: 2018-10-20 | Stop reason: HOSPADM

## 2018-10-16 RX ORDER — DEXMEDETOMIDINE HYDROCHLORIDE 4 UG/ML
INJECTION, SOLUTION INTRAVENOUS AS NEEDED
Status: DISCONTINUED | OUTPATIENT
Start: 2018-10-16 | End: 2018-10-16 | Stop reason: HOSPADM

## 2018-10-16 RX ORDER — FENTANYL CITRATE 50 UG/ML
50 INJECTION, SOLUTION INTRAMUSCULAR; INTRAVENOUS AS NEEDED
Status: DISCONTINUED | OUTPATIENT
Start: 2018-10-16 | End: 2018-10-16 | Stop reason: HOSPADM

## 2018-10-16 RX ORDER — SODIUM CHLORIDE, SODIUM LACTATE, POTASSIUM CHLORIDE, CALCIUM CHLORIDE 600; 310; 30; 20 MG/100ML; MG/100ML; MG/100ML; MG/100ML
125 INJECTION, SOLUTION INTRAVENOUS CONTINUOUS
Status: DISCONTINUED | OUTPATIENT
Start: 2018-10-16 | End: 2018-10-16 | Stop reason: HOSPADM

## 2018-10-16 RX ORDER — OXYCODONE HYDROCHLORIDE 5 MG/1
10 TABLET ORAL
Status: DISCONTINUED | OUTPATIENT
Start: 2018-10-16 | End: 2018-10-17

## 2018-10-16 RX ORDER — SODIUM CHLORIDE 9 MG/ML
25 INJECTION, SOLUTION INTRAVENOUS CONTINUOUS
Status: DISCONTINUED | OUTPATIENT
Start: 2018-10-16 | End: 2018-10-16 | Stop reason: HOSPADM

## 2018-10-16 RX ORDER — MIDAZOLAM HYDROCHLORIDE 1 MG/ML
0.5 INJECTION, SOLUTION INTRAMUSCULAR; INTRAVENOUS
Status: DISCONTINUED | OUTPATIENT
Start: 2018-10-16 | End: 2018-10-16 | Stop reason: HOSPADM

## 2018-10-16 RX ORDER — DOCUSATE SODIUM 100 MG/1
100 CAPSULE, LIQUID FILLED ORAL 2 TIMES DAILY
Status: DISCONTINUED | OUTPATIENT
Start: 2018-10-16 | End: 2018-10-20 | Stop reason: HOSPADM

## 2018-10-16 RX ORDER — CEFAZOLIN SODIUM/WATER 2 G/20 ML
2 SYRINGE (ML) INTRAVENOUS EVERY 8 HOURS
Status: COMPLETED | OUTPATIENT
Start: 2018-10-16 | End: 2018-10-17

## 2018-10-16 RX ORDER — HYDROMORPHONE HYDROCHLORIDE 2 MG/ML
INJECTION, SOLUTION INTRAMUSCULAR; INTRAVENOUS; SUBCUTANEOUS AS NEEDED
Status: DISCONTINUED | OUTPATIENT
Start: 2018-10-16 | End: 2018-10-16 | Stop reason: HOSPADM

## 2018-10-16 RX ORDER — MORPHINE SULFATE 10 MG/ML
2 INJECTION, SOLUTION INTRAMUSCULAR; INTRAVENOUS
Status: DISCONTINUED | OUTPATIENT
Start: 2018-10-16 | End: 2018-10-16 | Stop reason: HOSPADM

## 2018-10-16 RX ORDER — ROPIVACAINE HYDROCHLORIDE 5 MG/ML
30 INJECTION, SOLUTION EPIDURAL; INFILTRATION; PERINEURAL ONCE
Status: DISCONTINUED | OUTPATIENT
Start: 2018-10-16 | End: 2018-10-16 | Stop reason: HOSPADM

## 2018-10-16 RX ORDER — DEXAMETHASONE SODIUM PHOSPHATE 4 MG/ML
INJECTION, SOLUTION INTRA-ARTICULAR; INTRALESIONAL; INTRAMUSCULAR; INTRAVENOUS; SOFT TISSUE AS NEEDED
Status: DISCONTINUED | OUTPATIENT
Start: 2018-10-16 | End: 2018-10-16 | Stop reason: HOSPADM

## 2018-10-16 RX ORDER — SODIUM CHLORIDE 9 MG/ML
125 INJECTION, SOLUTION INTRAVENOUS CONTINUOUS
Status: DISCONTINUED | OUTPATIENT
Start: 2018-10-16 | End: 2018-10-17

## 2018-10-16 RX ORDER — ONDANSETRON 2 MG/ML
INJECTION INTRAMUSCULAR; INTRAVENOUS AS NEEDED
Status: DISCONTINUED | OUTPATIENT
Start: 2018-10-16 | End: 2018-10-16 | Stop reason: HOSPADM

## 2018-10-16 RX ORDER — SODIUM CHLORIDE 0.9 % (FLUSH) 0.9 %
5-10 SYRINGE (ML) INJECTION AS NEEDED
Status: DISCONTINUED | OUTPATIENT
Start: 2018-10-16 | End: 2018-10-20 | Stop reason: HOSPADM

## 2018-10-16 RX ORDER — BUPIVACAINE HYDROCHLORIDE AND EPINEPHRINE 5; 5 MG/ML; UG/ML
INJECTION, SOLUTION EPIDURAL; INTRACAUDAL; PERINEURAL AS NEEDED
Status: DISCONTINUED | OUTPATIENT
Start: 2018-10-16 | End: 2018-10-16 | Stop reason: HOSPADM

## 2018-10-16 RX ADMIN — DEXAMETHASONE SODIUM PHOSPHATE 8 MG: 4 INJECTION, SOLUTION INTRA-ARTICULAR; INTRALESIONAL; INTRAMUSCULAR; INTRAVENOUS; SOFT TISSUE at 12:28

## 2018-10-16 RX ADMIN — CYCLOBENZAPRINE 10 MG: 10 TABLET, FILM COATED ORAL at 20:07

## 2018-10-16 RX ADMIN — FENTANYL CITRATE 25 MCG: 50 INJECTION, SOLUTION INTRAMUSCULAR; INTRAVENOUS at 15:45

## 2018-10-16 RX ADMIN — ROCURONIUM BROMIDE 10 MG: 10 INJECTION, SOLUTION INTRAVENOUS at 13:23

## 2018-10-16 RX ADMIN — OXYCODONE AND ACETAMINOPHEN 2 TABLET: 5; 325 TABLET ORAL at 22:43

## 2018-10-16 RX ADMIN — ONDANSETRON 4 MG: 2 INJECTION INTRAMUSCULAR; INTRAVENOUS at 14:08

## 2018-10-16 RX ADMIN — ROCURONIUM BROMIDE 50 MG: 10 INJECTION, SOLUTION INTRAVENOUS at 12:33

## 2018-10-16 RX ADMIN — DEXTROSE MONOHYDRATE, SODIUM CHLORIDE, AND POTASSIUM CHLORIDE 75 ML/HR: 50; 4.5; 1.49 INJECTION, SOLUTION INTRAVENOUS at 15:53

## 2018-10-16 RX ADMIN — OXYCODONE AND ACETAMINOPHEN 2 TABLET: 5; 325 TABLET ORAL at 17:53

## 2018-10-16 RX ADMIN — SODIUM CHLORIDE, SODIUM LACTATE, POTASSIUM CHLORIDE, CALCIUM CHLORIDE: 600; 310; 30; 20 INJECTION, SOLUTION INTRAVENOUS at 12:17

## 2018-10-16 RX ADMIN — FENTANYL CITRATE 50 MCG: 50 INJECTION, SOLUTION INTRAMUSCULAR; INTRAVENOUS at 12:36

## 2018-10-16 RX ADMIN — NEOSTIGMINE METHYLSULFATE 3 MG: 1 INJECTION INTRAVENOUS at 14:57

## 2018-10-16 RX ADMIN — ATORVASTATIN CALCIUM 40 MG: 40 TABLET, FILM COATED ORAL at 22:43

## 2018-10-16 RX ADMIN — FENTANYL CITRATE 25 MCG: 50 INJECTION, SOLUTION INTRAMUSCULAR; INTRAVENOUS at 16:02

## 2018-10-16 RX ADMIN — SUCCINYLCHOLINE CHLORIDE 140 MG: 20 INJECTION INTRAMUSCULAR; INTRAVENOUS at 12:26

## 2018-10-16 RX ADMIN — DEXMEDETOMIDINE HYDROCHLORIDE 4 MCG: 4 INJECTION, SOLUTION INTRAVENOUS at 15:07

## 2018-10-16 RX ADMIN — PROPOFOL 200 MG: 10 INJECTION, EMULSION INTRAVENOUS at 12:24

## 2018-10-16 RX ADMIN — DEXMEDETOMIDINE HYDROCHLORIDE 4 MCG: 4 INJECTION, SOLUTION INTRAVENOUS at 14:11

## 2018-10-16 RX ADMIN — GLYCOPYRROLATE 0.4 MG: 0.2 INJECTION INTRAMUSCULAR; INTRAVENOUS at 14:57

## 2018-10-16 RX ADMIN — DEXMEDETOMIDINE HYDROCHLORIDE 4 MCG: 4 INJECTION, SOLUTION INTRAVENOUS at 14:26

## 2018-10-16 RX ADMIN — ACETAMINOPHEN 820 MG: 10 INJECTION, SOLUTION INTRAVENOUS at 13:24

## 2018-10-16 RX ADMIN — Medication 2 G: at 20:08

## 2018-10-16 RX ADMIN — FENTANYL CITRATE 100 MCG: 50 INJECTION, SOLUTION INTRAMUSCULAR; INTRAVENOUS at 12:24

## 2018-10-16 RX ADMIN — DOCUSATE SODIUM 100 MG: 100 CAPSULE, LIQUID FILLED ORAL at 17:53

## 2018-10-16 RX ADMIN — MIDAZOLAM HYDROCHLORIDE 2 MG: 1 INJECTION, SOLUTION INTRAMUSCULAR; INTRAVENOUS at 12:16

## 2018-10-16 RX ADMIN — DEXMEDETOMIDINE HYDROCHLORIDE 4 MCG: 4 INJECTION, SOLUTION INTRAVENOUS at 14:22

## 2018-10-16 RX ADMIN — DEXMEDETOMIDINE HYDROCHLORIDE 4 MCG: 4 INJECTION, SOLUTION INTRAVENOUS at 13:44

## 2018-10-16 RX ADMIN — HYDROMORPHONE HYDROCHLORIDE 0.5 MG: 2 INJECTION, SOLUTION INTRAMUSCULAR; INTRAVENOUS; SUBCUTANEOUS at 15:24

## 2018-10-16 RX ADMIN — FENTANYL CITRATE 25 MCG: 50 INJECTION, SOLUTION INTRAMUSCULAR; INTRAVENOUS at 16:30

## 2018-10-16 RX ADMIN — GABAPENTIN 300 MG: 300 CAPSULE ORAL at 22:43

## 2018-10-16 RX ADMIN — OXYCODONE HYDROCHLORIDE 10 MG: 5 TABLET ORAL at 20:07

## 2018-10-16 RX ADMIN — CEFAZOLIN SODIUM 2 G: 1 INJECTION, POWDER, FOR SOLUTION INTRAMUSCULAR; INTRAVENOUS at 12:33

## 2018-10-16 NOTE — PERIOP NOTES
TRANSFER - OUT REPORT: 
 
Verbal report given to Jeanette Hoang RN(name) on Kirby Barton  being transferred to [facility](unit) for routine post - op Report consisted of patients Situation, Background, Assessment and  
Recommendations(SBAR). Time Pre op antibiotic given:1233 Anesthesia Stop time: 1524 Rader Present on Transfer to floor:YES Order for Rader on Chart:YES Discharge Prescriptions with ChartNO:NO Information from the following report(s)  was reviewed with the receiving nurse. Opportunity for questions and clarification was provided. Is the patient on 02? YES 
     L/Min 2 Other Is the patient on a monitor? YES Is the nurse transporting with the patient? YES Surgical Waiting Area notified of patient's transfer from PACU? YES The following personal items collected during your admission accompanied patient upon transfer:  
Dental Appliance: Dental Appliances: Other (comment) (rt implant, 5 crowns) Vision: Visual Aid: Glasses Hearing Aid:   
Jewelry:   
Clothing: Clothing:  (clothes bag to pacu) Other Valuables:   
Valuables sent to safe:   
 
 
NO CLOTHING TO PACU, PT FROM ROOM

## 2018-10-16 NOTE — ANESTHESIA PREPROCEDURE EVALUATION
Anesthetic History No history of anesthetic complications Review of Systems / Medical History Patient summary reviewed, nursing notes reviewed and pertinent labs reviewed Pulmonary Sleep apnea Asthma : well controlled Neuro/Psych Within defined limits Cardiovascular Hypertension: well controlled GI/Hepatic/Renal 
  
GERD: well controlled PUD Endo/Other Hypothyroidism: well controlled Other Findings Physical Exam 
 
Airway Mallampati: II 
TM Distance: > 6 cm Neck ROM: normal range of motion Mouth opening: Normal 
 
 Cardiovascular Regular rate and rhythm,  S1 and S2 normal,  no murmur, click, rub, or gallop Dental 
No notable dental hx Pulmonary Breath sounds clear to auscultation Abdominal 
GI exam deferred Other Findings Anesthetic Plan ASA: 3 Anesthesia type: general 
 
 
 
 
Induction: Intravenous Anesthetic plan and risks discussed with: Patient

## 2018-10-16 NOTE — ANESTHESIA POSTPROCEDURE EVALUATION
Post-Anesthesia Evaluation and Assessment Patient: Ya Espinal MRN: [id number]  SSN: [id number] YOB: 1960  Age: 62 y.o. Sex: female Cardiovascular Function/Vital Signs Visit Vitals  /57  Pulse 79  Temp 36.9 °C (98.5 °F)  Resp 15  Ht 5' 4\" (1.626 m)  Wt 82.9 kg (182 lb 12.2 oz)  SpO2 96%  BMI 31.37 kg/m2 Patient is status post general anesthesia for Procedure(s): WOUND EXPLORATION , PLACEMENT OF LUMBAR DRAIN. Nausea/Vomiting: None Postoperative hydration reviewed and adequate. Pain: 
Pain Scale 1: Numeric (0 - 10) (10/16/18 1133) Pain Intensity 1: 2 (10/16/18 1133) Managed Neurological Status:  
Neuro Neurologic State: Alert (10/16/18 1580) Orientation Level: Oriented X4 (10/16/18 1718) Cognition: Follows commands (10/16/18 6299) Speech: Clear (10/16/18 8935) LUE Motor Response: Purposeful (10/16/18 3877) LLE Motor Response: Purposeful (10/16/18 3554) RUE Motor Response: Purposeful (10/16/18 4752) RLE Motor Response: Purposeful (10/16/18 3010) At baseline Mental Status and Level of Consciousness: Arousable Pulmonary Status:  
O2 Device: CO2 nasal cannula (10/16/18 1519) Adequate oxygenation and airway patent Complications related to anesthesia: None Post-anesthesia assessment completed. No concerns Signed By: Jefry Roth MD   
 October 16, 2018

## 2018-10-16 NOTE — BRIEF OP NOTE
BRIEF OPERATIVE NOTE Date of Procedure: 10/16/2018 Preoperative Diagnosis: CSF LEAK Postoperative Diagnosis: CSF LEAK Procedure(s): WOUND EXPLORATION , PLACEMENT OF LUMBAR DRAIN Surgeon(s) and Role: Maurice Singleton MD - Primary Surgical Assistant: Cyndie Valdes Surgical Staff: 
Circ-1: Art Cortes Scrub Tech-1: Millicent Banerjee Scrub RN-Relief: Latonya Lomas RN Surg Asst-1: Juanjose Quan Event Time In Incision Start [phone] Incision Close 1502 Anesthesia: General  
Estimated Blood Loss: 50cc Specimens: * No specimens in log * Findings: CSF collection, no obvious site of leak, previous durotomy intact Complications: none Implants: none

## 2018-10-16 NOTE — PROGRESS NOTES
Care Management Interventions PCP Verified by CM: Yes 
Palliative Care Criteria Met (RRAT>21 & CHF Dx)?: No 
MyChart Signup: No 
Discharge Durable Medical Equipment: No 
Physical Therapy Consult: No 
Occupational Therapy Consult: No 
Speech Therapy Consult: No 
Current Support Network: Own Home Confirm Follow Up Transport: Family Plan discussed with Pt/Family/Caregiver: Yes Freedom of Choice Offered: Yes [employer] Information Provided?: No 
Discharge Location Discharge Placement: Home with home health Reason for Readmission:  CSF leak, s/p fall RRAT Score and Risk Level:     7, low Level of Readmission:    1 Care Conference scheduled:   No 
    
Resources/supports as identified by patient/family:   Supportive mother, friends Top Challenges facing patient (as identified by patient/family and CM): Finances/Medication cost?     No concerns, insured through [employer] Transportation      Drives self, but mother will drive at discharge Support system or lack thereof? See above Living arrangements? Typically lives alone but her mother has been staying with her s/p surgery Self-care/ADLs/Cognition? Independent with ADLs, alert and oriented x4 Current Advanced Directive/Advance Care Plan: Mother is the next of kin Plan for utilizing home health:   TBD Likelihood of additional readmission: low Transition of Care Plan:    Based on readmission, the patient's previous Plan of Care 
 has been evaluated and/or modified. The current Transition of Care Plan is:    Chart reviewed for transitions of care, discussed patient during rounds. Cm met with patient and her mother at the bedside to explain role and offer support. Patient is alert and oriented x4, confirmed that upon her last discharge, she went home with home health through [facility]. Patient returned for bad headaches and was found to have a CSF leak.  Gallo informed her I would assist with any discharge needs she may have.  
[employer] , [place]

## 2018-10-16 NOTE — PROGRESS NOTES
Spiritual Care Partner Volunteer visited patient in Rm 549 on 10/16/2018. Documented by: 
Chaplain Diallo MDiv, MS, [facility] 
[phone])

## 2018-10-16 NOTE — PROGRESS NOTES
TRANSFER - IN REPORT: 
 
Verbal report received from 1200 Ritchie Leone RN(name) on Derek Diane  being received from [facility] [employer]) for routine post - op Report consisted of patients Situation, Background, Assessment and  
Recommendations(SBAR). Information from the following report(s) SBAR, Kardex and Recent Results was reviewed with the receiving nurse. Opportunity for questions and clarification was provided. Assessment completed upon patients arrival to unit and care assumed.

## 2018-10-17 PROCEDURE — 65660000000 HC RM CCU STEPDOWN

## 2018-10-17 PROCEDURE — 74011250637 HC RX REV CODE- 250/637: Performed by: NEUROLOGICAL SURGERY

## 2018-10-17 PROCEDURE — 74011250636 HC RX REV CODE- 250/636: Performed by: NEUROLOGICAL SURGERY

## 2018-10-17 PROCEDURE — 77010033678 HC OXYGEN DAILY

## 2018-10-17 RX ADMIN — Medication 10 ML: at 21:52

## 2018-10-17 RX ADMIN — OXYCODONE HYDROCHLORIDE 10 MG: 5 TABLET ORAL at 00:42

## 2018-10-17 RX ADMIN — OXYCODONE HYDROCHLORIDE 10 MG: 5 TABLET ORAL at 11:19

## 2018-10-17 RX ADMIN — CYCLOBENZAPRINE 10 MG: 10 TABLET, FILM COATED ORAL at 11:19

## 2018-10-17 RX ADMIN — Medication 2 G: at 03:59

## 2018-10-17 RX ADMIN — CYCLOBENZAPRINE 10 MG: 10 TABLET, FILM COATED ORAL at 04:01

## 2018-10-17 RX ADMIN — ESTRADIOL 0.5 MG: 1 TABLET ORAL at 09:09

## 2018-10-17 RX ADMIN — DOCUSATE SODIUM 100 MG: 100 CAPSULE, LIQUID FILLED ORAL at 17:15

## 2018-10-17 RX ADMIN — LOSARTAN POTASSIUM: 50 TABLET, FILM COATED ORAL at 09:14

## 2018-10-17 RX ADMIN — GABAPENTIN 300 MG: 300 CAPSULE ORAL at 09:10

## 2018-10-17 RX ADMIN — ATORVASTATIN CALCIUM 40 MG: 40 TABLET, FILM COATED ORAL at 21:52

## 2018-10-17 RX ADMIN — DOCUSATE SODIUM 100 MG: 100 CAPSULE, LIQUID FILLED ORAL at 09:10

## 2018-10-17 RX ADMIN — CYCLOBENZAPRINE 10 MG: 10 TABLET, FILM COATED ORAL at 17:15

## 2018-10-17 RX ADMIN — GABAPENTIN 300 MG: 300 CAPSULE ORAL at 21:52

## 2018-10-17 RX ADMIN — PANTOPRAZOLE SODIUM 40 MG: 40 TABLET, DELAYED RELEASE ORAL at 07:01

## 2018-10-17 RX ADMIN — DEXTROSE MONOHYDRATE, SODIUM CHLORIDE, AND POTASSIUM CHLORIDE 75 ML/HR: 50; 4.5; 1.49 INJECTION, SOLUTION INTRAVENOUS at 04:00

## 2018-10-17 RX ADMIN — OXYCODONE AND ACETAMINOPHEN 2 TABLET: 5; 325 TABLET ORAL at 09:10

## 2018-10-17 RX ADMIN — CETIRIZINE HYDROCHLORIDE 10 MG: 10 TABLET, FILM COATED ORAL at 09:10

## 2018-10-17 RX ADMIN — OXYCODONE HYDROCHLORIDE 10 MG: 5 TABLET ORAL at 07:01

## 2018-10-17 RX ADMIN — VENLAFAXINE HYDROCHLORIDE 150 MG: 150 CAPSULE, EXTENDED RELEASE ORAL at 09:10

## 2018-10-17 RX ADMIN — POTASSIUM CHLORIDE 10 MEQ: 750 TABLET, FILM COATED, EXTENDED RELEASE ORAL at 09:00

## 2018-10-17 RX ADMIN — OXYCODONE AND ACETAMINOPHEN 1 TABLET: 5; 325 TABLET ORAL at 04:01

## 2018-10-17 RX ADMIN — LEVOTHYROXINE SODIUM 100 MCG: 100 TABLET ORAL at 07:01

## 2018-10-17 RX ADMIN — OXYCODONE AND ACETAMINOPHEN 2 TABLET: 5; 325 TABLET ORAL at 17:15

## 2018-10-17 RX ADMIN — Medication 10 ML: at 07:01

## 2018-10-17 RX ADMIN — OXYCODONE AND ACETAMINOPHEN 2 TABLET: 5; 325 TABLET ORAL at 21:52

## 2018-10-17 NOTE — PROGRESS NOTES
Occupational Therapy Order received and chart reviewed, discussed with RN who reported patient to be flat today and slowly elevate later this date, out of bed tomorrow. Will follow up tomorrow when patient cleared for out of bed. Thank you.   
Zoey Guillermo, OTR/L

## 2018-10-17 NOTE — PROGRESS NOTES
Neurosurgery Progress Note Felicia Bautista 
[phone] Admit Date: 10/14/2018 LOS: 3 days Daily Progress Note: 10/17/2018 POD:1 Day Post-Op S/P: Procedure(s): WOUND EXPLORATION , PLACEMENT OF LUMBAR DRAIN Subjective: The patient had an L3-4 fusion with exploration of a previous L4-S1 fusion on 18 by Dr. Isabel Thurman. The patient had a durotomy during surgery, but this was repaired primarily. She did well post-operatively and reported she was progressing at home to ambulating without a walker or a cane the past 10 days. Unfortunately, she fell out of bed by rolling off of it in the middle of the night because she thought her dog was sleeping in front of her and not behind her. Around 48 hours after that incident, she began having positional headaches that were so severe, she could only ambulate to the bathroom because she had to lie back down due to her headache. She did not have any drainage from her wound. She was admitted to the hospital and an MRI was completed, which showed a large fluid collection posterior to the dural sac. The patient was taken to the operating room for a wound exploration and lumbar drain placement on 10/16/18 with Dr. Isabel Thurman. The lumbar drain has been working well. The patient was able to sit up a little bit for lunch without a headache developing. Denies numbness, tingling, chest pain, leg pain, nausea, vomiting, difficulty swallowing, headache, and dyspnea. Objective:  
 
Vital signs Temp (24hrs), Av.2 °F (36.8 °C), Min:97.8 °F (36.6 °C), Max:98.6 °F (37 °C) 
 10/17 0701 - 10/17 1900 In: -  
Out: 7987 [Urine:1125; Drains:90]  10/15 1901 - 10/17 07 In: -  
Out: 2995 [Urine:2825; Drains:120] Visit Vitals /47 (BP 1 Location: Right arm, BP Patient Position: At rest) Pulse 80 Temp 98.3 °F (36.8 °C) Resp 13 Ht 5' 4\" (1.626 m) Wt 82.3 kg (181 lb 7 oz) SpO2 95% Breastfeeding? No  
BMI 31.14 kg/m² O2 Flow Rate (L/min): 2 l/min O2 Device: Room air Pain control Pain Assessment Pain Scale 1: Numeric (0 - 10) Pain Intensity 1: 3 Pain Onset 1: post op Pain Location 1: Back Pain Orientation 1: Mid 
Pain Description 1: Aching Pain Intervention(s) 1: Declines PT/OT Gait Physical Exam: 
Gen:NAD. Neuro: A&Ox3. Follows commands. Speech clear. Affect normal. 
BOWER spontaneously. Strength 5/5 LUE/LLE 4+/5 RUE/RLE (baseline) Gait deferred. Skin: Lumbar dressing C/D/I. Lumbar drain in place draining 10 cc/hr CSF 
 
24 hour results: No results found for this or any previous visit (from the past 24 hour(s)). Assessment:  
 
Active Problems: 
  Back pain (10/14/2018) Plan: 1. CSF leak - s/p wound exploration and lumbar drain placement 10/16 
 - cont lumbar drain 10 cc/hr 
 - Flat in bed except may raise HOB to 30 degrees for meals - Pain control with gabapentin, percocet PRN 2. HTN 
 - Cont Hyzaar from home 3. Dyslipidemia 
 - Cont statin from home 4. GERD 
 - Cont PPI from home 5. Obesity - BMI 31.1 
 - Counseling as able 6. Hypothyroidism 
 - Cont levothyroxine from home Activity: flat in bed except HOB to 30 degrees with meals DVT ppx: SCDs Dispo: tbd 
Plan d/w Dr. Lisa Lyle, [address] [place] [address] nurse Griselda Lipschutz, FE

## 2018-10-17 NOTE — PROGRESS NOTES
POD1 
No complaints 
afeb VSS 
LD - 10cc/hr Dressing C/D/I 
S/p: wound exploration for CSF leak Continue lumbar drain Okay for HOB to 30 degrees for meals

## 2018-10-17 NOTE — PROGRESS NOTES
CM follow up. CM spoke with Sole Farrell, [facility], to inquire if patient was still open to [facility]. Per Yasmin Sewell, patient was discharged from [facility] on 10/4/18.

## 2018-10-17 NOTE — OP NOTES
[facility]  OPERATIVE REPORT    Name:Yanique ZAPATA  MR#: [id number]  : 1960  ACCOUNT #: [de-identified]   DATE OF SERVICE: 10/16/2018    PREOPERATIVE DIAGNOSIS:  Spinal fluid leak. POSTOPERATIVE DIAGNOSIS:  Spinal fluid leak. PROCEDURE PERFORMED:  Wound exploration and placement of lumbar drain. SURGEON:  Rosalinda Vasquez MD    ASSISTANT:  Tonny Hansen    ANESTHESIA:  General.    ESTIMATED BLOOD LOSS:  50 mL. FINDINGS:  CSF collection. No obvious spinal leak, previous durotomy intact. SPECIMENS REMOVED:  None. COMPLICATIONS:  None. IMPLANTS:  None. INDICATIONS FOR SURGERY:  This is a 55-year-old woman who had 2 previous lumbar fusions, was fused from L4-S1. I took her to the operating room for adjacent level disease decompression and fusion L3-4 with exploration and connection to her previous levels of fusion. This was performed on 2018. She did have an incidental durotomy intraoperatively that I repaired primarily, and she did not have any spinal fluid leak directly after surgery. She was doing very well up until several days ago when she fell to bed and shortly thereafter developed severe positional headaches. When these do not improve after 48 hours she presented to the Emergency Room. MRI revealed a large posterior fluid collection. Given that her symptoms were consistent with CSF leak and  MRI confirmed this, I elected to bring her to the operating room for wound exploration, possible placement of lumbar drain. She understood risks and benefits, agreed to proceed. OPERATIVE COURSE:  The patient was brought to the operating room. She was placed under general endotracheal anesthesia. She had a Rader catheter placed. She had SCDs placed and these were on and functional during the entire case. She received 2 grams of Ancef within 1 hour prior to starting the incision. This was written for 24 hours postoperatively.   After being placed under general endotracheal anesthesia she was turned prone onto a Rufus frame. I used an intraoperative x-ray to identify the levels of previous fusion, so I did not have to open the entire wound but just the area where I had previously decompressed. She was sterilely prepped and draped in standard fashion. I used Marcaine with epinephrine to infiltrate around the incision. Of note, she has a LIDOCAINE ALLERGY, therefore I used Marcaine with epinephrine. I used a 10 blade to incise the most rostral half of her previous incision. This was very well healed down to the muscle layer. Once I opened the muscular fascia there was clear fluid that came out with a large pseudomeningocele. This was opened. I carried the dissection down along the lamina of L2-3 to the facets. I found the previous pedicle screws at L3. I thought I saw an area  in the axilla on the right at L3/4 where there may have been some fluid leaking. I explored this area. There was some scarring that was tethering the thecal sac and I took down the scarring where the sac was tethered. I could not find any leak and there was no further leaking upon Valsalva maneuver. I do not know if the scarring perhaps was tethering the cord and contributing to an open leak. I did use the microscope to investigate that area as well as all other portions of exposed dura. I did use some FloSeal for hemostasis and this was again on the right lateral recess in the axilla. Upon multiple Valsalva maneuvers no further leaking from that area or any other area. There did appear to be a small leak as clear fluid would accumulate gradually over several minutes, but I could not find an exact place where it was leaking. The previous durotomy site was seen and this appeared to be well healed with no CSF leaking. There did not appear to be any leaking during the Valsalva maneuvers there.   I did not see any obvious fluid come out during a Valsalva maneuver; however, when we would observe the wound over several minutes, clear fluid with some blood would accumulate. I performed a total of 10 Valsalva maneuvers without being able to find any obvious site of leakage. Therefore, I placed a lumbar drain at the L2-3 level under direct vision and tunneled this underneath the skin and placed dural glue over the entire previous laminectomy area. The wound was then closed with multiple layers of 0 interrupted Vicryls for muscle, 2-0 interrupted Vicryl for Rosibel's fascia,  3-0 interrupted Vicryls for subcutaneous tissue and 4-0 running subcuticular stitch for the skin. The drain was secured to the skin and it was draining when we left the operating room. She tolerated the procedure well. She will be kept flat in the postoperative area.       MD SHERICE Westfall / Eder Guzmán  D: 10/16/2018 15:16     T: 10/17/2018 03:24  JOB #: [id number]

## 2018-10-17 NOTE — PROGRESS NOTES
*ATTENTION:  This note has been created by a NP student for educational purposes only. Please do not refer to the content of this note for clinical decision-making, billing, or other purposes. Please see attending physicians note to obtain clinical information on this patient. * Neurosurgery Progress Note Admit Date: 10/14/2018 LOS: 3 days Daily Progress Note: 10/17/2018 POD:1 Day Post-Op S/P: Procedure(s): WOUND EXPLORATION , PLACEMENT OF LUMBAR DRAIN 
 
HPI:  This patient is a 63yo female who presented to the ED via EMS with complaints of headache on 10/14/18. She recently had L3-L4 lumbar fusion with exploration of previous L4-S1 fusion on 18 with Dr. Gerald Valera. She reported falling out of bed onto the floor. She said her headaches started 48 hours after the fall to the point where she could only get to the bathroom before \"an excruciating headache would start,\" and she would get back in bed. As long as she was flat in bed, her head was fine. Dr. Gayle Powell saw the patient and admitted her. MRI w wo contrast of her lumbar spine revealed a large fluid collection posterior to the dural sac. She had surgery on 10/16/18 for the CSF leak with placement of a lumbar drain and wound exploration. Today is post op day 1. Subjective: This morning Ms. Sadi Puente was awake and lying flat in bed. She does not have any chest pain, leg pain, nausea, vomiting, difficulty swallowing, dizziness, numbness, tingling or dyspnea. She currently does not have any headache however did report a headache when she raised up earlier to get her call bell. Objective:  
 
Vital signs Temp (24hrs), Av.2 °F (36.8 °C), Min:97.8 °F (36.6 °C), Max:98.6 °F (37 °C) No intake/output data recorded. 10/15 1901 - 10/17 0700 In: -  
Out: 2995 [Urine:2825; Drains:120] Visit Vitals /53 (BP 1 Location: Right arm, BP Patient Position: At rest) Pulse 67 Temp 97.9 °F (36.6 °C) Resp 15  
 Ht 5' 4\" (1.626 m) Wt 82.3 kg (181 lb 7 oz) SpO2 97% Breastfeeding? No  
BMI 31.14 kg/m² O2 Flow Rate (L/min): 2 l/min O2 Device: Nasal cannula Pain control Pain Assessment Pain Scale 1: Numeric (0 - 10) Pain Intensity 1: 0 Pain Onset 1: post op Pain Location 1: Back Pain Orientation 1: Mid 
Pain Description 1: Aching Pain Intervention(s) 1: Medication (see MAR) PT/OT Gait Physical Exam: 
Gen:NAD. Lying in bed supine. Pleasant and conversational. Responds appropriately. Neuro: A&Ox3. Follows commands. Speech clear. Affect normal. 
PERRL. EOMI. Face symmetric. Tongue midline. BOWER. Strength, right extremities are weaker than left extremities per patient, that is her baseline. 4+/5 in RUE and RLE 
5/5 in LUE and LLE. Jenet Lanius Gait deferred. Skin: Island dressing midline of back, CDI. Lumbar drain, draining 10mL q hour. MRI of Lumbar spine w wo contrast 10/15/18: Per radiologist, L3-L4: This level has been decompressed. There is disc height loss with  degeneration of this disc. Differentiating residual disc bulge from postoperative changes difficult. A new extrusion is not demonstrated. . There is  
enhancing tissue ventrally not unexpected in the postoperative setting. There is  
a dorsal fluid collection. Overall canal stenosis is moderate with mild to  
moderate narrowing of the foramen. Assessment:  
61yo female with positional headaches that are relieved when lying flat who most recently had L3-L4 lumbar fusion surgery on 9/18/18 who was found to have a CSF leak. She underwent surgery and lumbar drain placement on 10/16/18. She is recovering well this morning on POD1. Plan is as follows. Active Problems: 
  Back pain (10/14/2018) Plan: 1. CSF leak - s/p wound exploration and lumbar drain placement 10/16/18 
 - stay flat in bed except for meals today 10/17/18 
 - drain 10mL q hour from lumbar drain - Flexeril 10mg tab, PO PRN TID for muscle spasms - PT/OT perhaps on PostOp Day 3 2. HLD 
 - continue home meds - Lipitor 40mg tab PO at bedtime 3. Hypothyroidism 
 -Continue home meds - Synthroid 100mcg Tab, PO q Sunday 4. GERD 
 - Protonix 40mg, tab, PO daily 5. HTN 
 - Hyzaar 100/25mg tab, PO, Daily 6. Asthma - Proventil Ventolin 1.25mg neb sln, PRN  q4h for wheezing 7. Chronic pain 
 - Gabapentin 300mg, cap, PO BID 
 - Roxicodone 10mg tab PO PRN q3h for severe pain - Percocet 5-325mg, tab, PO PRN q4h for moderate pain 8. RC 
 - continue CPAP from home settings 9. Obesity - BMI 31.1 
 - Counseling as able Activity: flat in bed except for meals DVT ppx: SCDs Dispo: TBD Plan d/w Lizbeth Shaw NP and patient Sara Guerrero NP student

## 2018-10-17 NOTE — PROGRESS NOTES
Bedside shift change report given to Kajal (oncoming nurse) by Eileen Sweet (offgoing nurse). Report included the following information SBAR, Kardex, Procedure Summary, Intake/Output, MAR, Accordion, Recent Results and Cardiac Rhythm NSR.

## 2018-10-18 PROCEDURE — 74011250637 HC RX REV CODE- 250/637: Performed by: NEUROLOGICAL SURGERY

## 2018-10-18 PROCEDURE — 65660000000 HC RM CCU STEPDOWN

## 2018-10-18 PROCEDURE — 94760 N-INVAS EAR/PLS OXIMETRY 1: CPT

## 2018-10-18 RX ADMIN — Medication 10 ML: at 06:13

## 2018-10-18 RX ADMIN — GABAPENTIN 300 MG: 300 CAPSULE ORAL at 08:15

## 2018-10-18 RX ADMIN — Medication 10 ML: at 14:25

## 2018-10-18 RX ADMIN — OXYCODONE AND ACETAMINOPHEN 2 TABLET: 5; 325 TABLET ORAL at 22:17

## 2018-10-18 RX ADMIN — ESTRADIOL 0.5 MG: 1 TABLET ORAL at 08:14

## 2018-10-18 RX ADMIN — LEVOTHYROXINE SODIUM 100 MCG: 100 TABLET ORAL at 07:00

## 2018-10-18 RX ADMIN — VENLAFAXINE HYDROCHLORIDE 150 MG: 150 CAPSULE, EXTENDED RELEASE ORAL at 09:00

## 2018-10-18 RX ADMIN — CYCLOBENZAPRINE 10 MG: 10 TABLET, FILM COATED ORAL at 16:23

## 2018-10-18 RX ADMIN — ATORVASTATIN CALCIUM 40 MG: 40 TABLET, FILM COATED ORAL at 21:08

## 2018-10-18 RX ADMIN — CYCLOBENZAPRINE 10 MG: 10 TABLET, FILM COATED ORAL at 00:31

## 2018-10-18 RX ADMIN — OXYCODONE AND ACETAMINOPHEN 1 TABLET: 5; 325 TABLET ORAL at 06:12

## 2018-10-18 RX ADMIN — OXYCODONE AND ACETAMINOPHEN 2 TABLET: 5; 325 TABLET ORAL at 12:34

## 2018-10-18 RX ADMIN — OXYCODONE AND ACETAMINOPHEN 1 TABLET: 5; 325 TABLET ORAL at 18:59

## 2018-10-18 RX ADMIN — CETIRIZINE HYDROCHLORIDE 10 MG: 10 TABLET, FILM COATED ORAL at 08:15

## 2018-10-18 RX ADMIN — CYCLOBENZAPRINE 10 MG: 10 TABLET, FILM COATED ORAL at 22:17

## 2018-10-18 RX ADMIN — DOCUSATE SODIUM 100 MG: 100 CAPSULE, LIQUID FILLED ORAL at 18:15

## 2018-10-18 RX ADMIN — POTASSIUM CHLORIDE 10 MEQ: 750 TABLET, FILM COATED, EXTENDED RELEASE ORAL at 08:14

## 2018-10-18 RX ADMIN — Medication 10 ML: at 21:09

## 2018-10-18 RX ADMIN — PANTOPRAZOLE SODIUM 40 MG: 40 TABLET, DELAYED RELEASE ORAL at 06:14

## 2018-10-18 RX ADMIN — GABAPENTIN 300 MG: 300 CAPSULE ORAL at 21:08

## 2018-10-18 RX ADMIN — CYCLOBENZAPRINE 10 MG: 10 TABLET, FILM COATED ORAL at 08:15

## 2018-10-18 RX ADMIN — DOCUSATE SODIUM 100 MG: 100 CAPSULE, LIQUID FILLED ORAL at 08:15

## 2018-10-18 NOTE — PROGRESS NOTES
Neurosurgery Progress Note Carlos AGACNP-BC 
[phone] Admit Date: 10/14/2018 LOS: 4 days Daily Progress Note: 10/18/2018 POD:2 Day Post-Op S/P: Procedure(s): WOUND EXPLORATION , PLACEMENT OF LUMBAR DRAIN Subjective: The patient had an L3-4 fusion with exploration of a previous L4-S1 fusion on 18 by Dr. Jakob Michaels. The patient had a durotomy during surgery, but this was repaired primarily. She did well post-operatively and reported she was progressing at home to ambulating without a walker or a cane the past 10 days. Unfortunately, she fell out of bed by rolling off of it in the middle of the night because she thought her dog was sleeping in front of her and not behind her. Around 48 hours after that incident, she began having positional headaches that were so severe, she could only ambulate to the bathroom because she had to lie back down due to her headache. She did not have any drainage from her wound. She was admitted to the hospital and an MRI was completed, which showed a large fluid collection posterior to the dural sac. The patient was taken to the operating room for a wound exploration and lumbar drain placement on 10/16/18 with Dr. Jakob Michaels. Today patient is feeling well. She had no headaches with HOB at 30 degrees for meals. Lumbar drain is patent. We will progress with HOB at 90 degrees today and sitting at side of bed PRN. No OOB activity just yet. Continue lumbar drain @ 10cc every hour. Denies numbness, tingling, chest pain, leg pain, nausea, vomiting, difficulty swallowing, headache, and dyspnea. Objective:  
 
Vital signs Temp (24hrs), Av.3 °F (36.8 °C), Min:98.3 °F (36.8 °C), Max:98.4 °F (36.9 °C) 
 10/18 0701 - 10/18 1900 In: -  
Out: 30 [Drains:30]  10/16 1901 - 10/18 07 In: 840 [P.O.:840] Out: Alexus Cleveland [LABOO:1630; XPMJMH:896] Visit Vitals /60 (BP 1 Location: Left arm, BP Patient Position: At rest) Pulse 82 Temp 98.4 °F (36.9 °C) Resp 16 Ht 5' 4\" (1.626 m) Wt 73.7 kg (162 lb 7.7 oz) SpO2 97% Breastfeeding? No  
BMI 27.89 kg/m² O2 Flow Rate (L/min): 2 l/min O2 Device: Room air Pain control Pain Assessment Pain Scale 1: Numeric (0 - 10) Pain Intensity 1: 7 Pain Onset 1: post op Pain Location 1: Back Pain Orientation 1: Mid 
Pain Description 1: Aching Pain Intervention(s) 1: Medication (see MAR) PT/OT Gait Physical Exam: 
Gen:NAD. Neuro: A&Ox3. Follows commands. Speech clear. Affect normal. 
BOWER spontaneously. Strength 5/5 LUE/LLE 4+/5 RUE/RLE (baseline) Gait deferred. Skin: Lumbar dressing C/D/I. Lumbar drain in place draining 10 cc/hr CSF 
 
24 hour results: No results found for this or any previous visit (from the past 24 hour(s)). Assessment:  
 
Active Problems: 
  Back pain (10/14/2018) Plan: 1. CSF leak - s/p wound exploration and lumbar drain placement 10/16 
 - cont lumbar drain 10 cc/hr  
 - ok to raise HOB to 90 degrees and sit EOB. Monitor for headaches. - Pain control with gabapentin, oxycodone PRN 2. HTN 
 - Cont Hyzaar from home 3. Dyslipidemia 
 - Cont statin from home 4. GERD 
 - Cont PPI from home 5. Obesity - BMI 31.1 
 - Counseling as able 6. Hypothyroidism 
 - Cont levothyroxine from home Activity: up to 90 degrees PRN. Hold off on OOB activity for now. DVT ppx: SCDs Dispo: tbd 
Plan d/w Dr. Isabel Thurman, 61 [facility] nurse Idalia Myers NP

## 2018-10-18 NOTE — PROGRESS NOTES
Bedside and Verbal shift change report given to [address] (oncoming nurse) by Og Galindo RN (offgoing nurse). Report included the following information SBAR, Kardex and Recent Results.

## 2018-10-18 NOTE — PROGRESS NOTES
Occupational Therapy Chart reviewed and noted no out of bed activity yet. Will follow up tomorrow as indicated.   
Shaq Mcrae, OTR/L

## 2018-10-18 NOTE — PROGRESS NOTES
POD2 
No complaints, no headache yesterday when HOB at 30 degrees 
afeb VSS 
LD - 10cc/hr Dressing C/D/I 
S/p: wound exploration for CSF leak Continue lumbar drain Okay for [facility] elevated, okay to sit at side of bed

## 2018-10-19 PROCEDURE — 74011250637 HC RX REV CODE- 250/637: Performed by: NURSE PRACTITIONER

## 2018-10-19 PROCEDURE — 65660000000 HC RM CCU STEPDOWN

## 2018-10-19 PROCEDURE — 74011250637 HC RX REV CODE- 250/637: Performed by: NEUROLOGICAL SURGERY

## 2018-10-19 PROCEDURE — 97165 OT EVAL LOW COMPLEX 30 MIN: CPT | Performed by: OCCUPATIONAL THERAPIST

## 2018-10-19 PROCEDURE — 97535 SELF CARE MNGMENT TRAINING: CPT | Performed by: OCCUPATIONAL THERAPIST

## 2018-10-19 RX ORDER — OXYCODONE HYDROCHLORIDE 5 MG/1
5-10 TABLET ORAL
Qty: 40 TAB | Refills: 0 | Status: SHIPPED | OUTPATIENT
Start: 2018-10-19 | End: 2019-05-28

## 2018-10-19 RX ORDER — OXYCODONE HYDROCHLORIDE 5 MG/1
5-10 TABLET ORAL
Status: DISCONTINUED | OUTPATIENT
Start: 2018-10-19 | End: 2018-10-20 | Stop reason: HOSPADM

## 2018-10-19 RX ADMIN — OXYCODONE HYDROCHLORIDE 10 MG: 5 TABLET ORAL at 22:06

## 2018-10-19 RX ADMIN — ESTRADIOL 0.5 MG: 1 TABLET ORAL at 08:27

## 2018-10-19 RX ADMIN — DOCUSATE SODIUM 100 MG: 100 CAPSULE, LIQUID FILLED ORAL at 08:27

## 2018-10-19 RX ADMIN — CYCLOBENZAPRINE 10 MG: 10 TABLET, FILM COATED ORAL at 06:32

## 2018-10-19 RX ADMIN — OXYCODONE HYDROCHLORIDE 5 MG: 5 TABLET ORAL at 13:04

## 2018-10-19 RX ADMIN — LEVOTHYROXINE SODIUM 100 MCG: 100 TABLET ORAL at 06:32

## 2018-10-19 RX ADMIN — OXYCODONE HYDROCHLORIDE 5 MG: 5 TABLET ORAL at 16:05

## 2018-10-19 RX ADMIN — GABAPENTIN 300 MG: 300 CAPSULE ORAL at 08:26

## 2018-10-19 RX ADMIN — CYCLOBENZAPRINE 10 MG: 10 TABLET, FILM COATED ORAL at 18:41

## 2018-10-19 RX ADMIN — PANTOPRAZOLE SODIUM 40 MG: 40 TABLET, DELAYED RELEASE ORAL at 06:32

## 2018-10-19 RX ADMIN — GABAPENTIN 300 MG: 300 CAPSULE ORAL at 22:06

## 2018-10-19 RX ADMIN — Medication 10 ML: at 13:00

## 2018-10-19 RX ADMIN — CETIRIZINE HYDROCHLORIDE 10 MG: 10 TABLET, FILM COATED ORAL at 08:26

## 2018-10-19 RX ADMIN — Medication 10 ML: at 06:32

## 2018-10-19 RX ADMIN — VENLAFAXINE HYDROCHLORIDE 150 MG: 150 CAPSULE, EXTENDED RELEASE ORAL at 08:26

## 2018-10-19 RX ADMIN — LOSARTAN POTASSIUM: 50 TABLET, FILM COATED ORAL at 08:26

## 2018-10-19 RX ADMIN — ATORVASTATIN CALCIUM 40 MG: 40 TABLET, FILM COATED ORAL at 22:06

## 2018-10-19 RX ADMIN — OXYCODONE AND ACETAMINOPHEN 2 TABLET: 5; 325 TABLET ORAL at 06:32

## 2018-10-19 RX ADMIN — Medication 10 ML: at 22:06

## 2018-10-19 RX ADMIN — DOCUSATE SODIUM 100 MG: 100 CAPSULE, LIQUID FILLED ORAL at 17:08

## 2018-10-19 RX ADMIN — POTASSIUM CHLORIDE 10 MEQ: 750 TABLET, FILM COATED, EXTENDED RELEASE ORAL at 08:26

## 2018-10-19 NOTE — DISCHARGE INSTRUCTIONS
After Hospital Care Plan:  Discharge Instructions Lumbar Surgery Dr. Tez Rosales    Patient Name: Yves Rea    Date of procedure: 10/16/2018      Date of discharge: 10/19/2018    Procedure: Procedure(s):  WOUND EXPLORATION , PLACEMENT OF LUMBAR DRAIN      PCP: Hannah aClvin MD    Follow up appointments  -Follow up with Dr. Tez Rosales in 2 weeks. Call [phone] to make an appointment as soon as you get home from the hospital.    When to call your Spine Surgeon:  -Signs of infection-if your incision is red; continues to have drainage; drainage has a foul odor or if you have a persistent fever over 101 degrees for 24 hours  -Nausea or vomiting, severe headache  -Loss of bowel or bladder function, inability to urinate  -Changes in sensation in your arms or legs (numbness, tingling, loss of color)  -Increased weakness-greater than before your surgery  -Severe pain or pain not relieved by medications  -Signs of a blood clot in your leg-calf pain, tenderness, redness, swelling of lower leg    When to call your Primary Care Physician:  -Concerns about medical conditions such as diabetes, high blood pressure, asthma, congestive heart failure  -Call if blood sugars are elevated, persistent headache or dizziness, coughing or congestion, constipation or diarrhea, burning with urination, abnormal heart rate    When to call [phone] and go to the nearest emergency room:  -Acute onset of chest pain, shortness of breath, difficulty breathing    Activity  - You are going home a well person, be as active as possible. Your only exercise should be walking. Start with short frequent walks and increase your walking distance each day.  -Limit the amount of time you sit to 20-30 minute intervals. Sitting for prolonged periods of time will be uncomfortable for you following surgery.  -Do NOT lift anything over 5 pounds  -Do NOT do any straining, twisting or bending  -When you are in bed, you may lay on your back or on either side. Do NOT lie on your stomach    Brace  -Not all patients require a brace, your physician or their assistant will advise on whether you need a brace prior to discharge. -If you have a back brace, you should wear your brace at all times when you are out of bed. Do not wear the brace while in bed or showering.  -Remember to always wear a cotton t-shirt underneath your brace.  -Do not bend or twist when your brace is off    Diet  -Resume usual diet; drink plenty of fluids; eat foods high in fiber  -It is important to have regular bowel movements. Pain medications may cause constipation. You may want to take a stool softener (such as Senokot-S or Colace) to prevent constipation.  -If constipation occurs, take a laxative (such as Dulcolax tablets, Milk of Magnesia, or a suppository). Laxatives should only be used if the above preventable measures have failed and you still have not had a bowel movement after three days    Driving  -You may not drive or return to work until instructed by your physician. However, you may ride in the car for short periods of time. Incision Care  -You may take brief showers but do not run the water run directly onto the wound. After showering or bathing, remove the wet dressing and gently blot the wound dry with a soft towel.  -Do not rub or apply any lotions or ointments to your incision site.   -Do not soak or scrub your wound  -Keep a dry dressing (ABD and paper tape) on your incision and have it changed daily for 14 days after surgery; more often if your incision is draining. Have your caregiver wash their hands thoroughly before changing your dressing.  -You will have absorbable sutures and steristrips (white tape) on your incision. Leave the steristrips on until they fall off. Showering  -You may shower in approximately 2 days after your surgery.    -Leave the dressing on during your shower. Do NOT allow the water to run directly onto your dressing.   Once you get out of the shower, put on a dry dressing.  -Reminder- your brace can be removed while showering. Remember to not bend or twist while your brace is off.    -Do not take a tub bath. Preventing blood clots  -You have been given T.E.D. stockings to wear. Continue to wear these for 7 days after your discharge. Put them on in the morning and take them off at night.    -They are used to increase your circulation and prevent blood clots from forming in your legs  -T. E.D. stockings can be machine washed, temperature not to exceed 160° F (71°C) and machine dried for 15 to 20 minutes, temperature not to exceed 250° F (121°C). Pain management  -Take pain medication as prescribed; decrease the amount you use as your pain lessens  -Do not wait until you are in extreme pain to take your medication.  -Avoid alcoholic beverages while taking pain medication    Pain Medication Safety  DO:  -Read the Medication Guide   -Take your medicine exactly as prescribed   -Store your medicine away from children and in a safe place   -Flush unused medicine down the toilet   -Call your healthcare provider for medical advice about side effects. You may report side effects to FDA at 8-448-FDA-7510.   -Please be aware that many medications contain Tylenol. We do not want you to over medicate so please read the information below as a guide. Do not take more than 4 Grams of Tylenol in a 24 hour period.   (There are 1000 milligrams in one Gram)                                                                                                                                                                                                                                                                                      Percocet contains 325 mg of Tylenol per tablet (do not take more than 12 tablets in 24 hours)  Lortab contains 500 mg of Tylenol per tablet (do not take more than 8 tablets in 24 hours)  Norco contains 325 mg of Tylenol per tablet (do not take more than 12 tablets in 24 hours). DO NOT:  -Do not give your medicine to others   -Do not take medicine unless it was prescribed for you   -Do not stop taking your medicine without talking to your healthcare provider   -Do not break, chew, crush, dissolve, or inject your medicine. If you cannot  swallow your medicine whole, talk to your healthcare provider.  -Do not drink alcohol while taking this medicine  -Do not take anti-inflammatory medications or aspirin unless instructed by your  physician.

## 2018-10-19 NOTE — PROGRESS NOTES
Occupational Therapy EVALUATION/discharge Patient: Karthik Huang (25 y.o. female) Date: 10/19/2018 Primary Diagnosis: Back pain CSS LEAK Procedure(s) (LRB): WOUND EXPLORATION , PLACEMENT OF LUMBAR DRAIN (N/A) 3 Days Post-Op Precautions:  Fall, Back ASSESSMENT:  
Based on the objective data described below, the patient presents at an overall mod I level with ADLs and functional mobility following her back precautions and managing her LSO brace. She demonstrated good safety awareness and no LOB. Pt has assist from her mother as needed at home and all equipment needed for safety. Further skilled acute occupational therapy is not indicated at this time. Discharge Recommendations: None Further Equipment Recommendations for Discharge: none in addition to what she owns SUBJECTIVE:  
Patient stated I feel so much better.  OBJECTIVE DATA SUMMARY:  
HISTORY:  
Past Medical History:  
Diagnosis Date  Arthritis DDD  Asthma RESCUE INHALER WHEN NEEDED  Chronic pain CHRONIC BACK AND NECK PAIN  
 GERD (gastroesophageal reflux disease)  Hyperlipemia  Hypertension  Osteopenia  Other ill-defined conditions(219.89) HX: PYELONEPHRITIS AND STONES  
 PUD (peptic ulcer disease)  Sleep apnea USES CPAP  Thyroid disease HYPOTHYROID Past Surgical History:  
Procedure Laterality Date [address] [place] V2contact BENIGN MASS RIGHT BREAST  HX BREAST BIOPSY Right 2003 Stereotactic  HX BREAST BIOPSY Right 2005 Surgical excision x2  HX BREAST REDUCTION Bilateral 2004  HX GI    
 COLONOSCOPY/ EGD  HX GI  1980s choleysystectomy  HX GYN  2001  
 hysterectomy  HX HEENT    
 WISDOM TEETH  
 HX OPEN CHOLECYSTECTOMY  HX ORTHOPAEDIC    
 C4, C5, L4, L5, S1 FUSIONS  NEUROLOGICAL PROCEDURE UNLISTED C4, C5, L4, L5, S1 FUSIONS Prior Level of Function/Environment/Context:  Mod I with ADLs since initial back sx 9/18/2018. On leave from work Home Situation Home Environment: Apartment # Steps to Enter: 0 One/Two Story Residence: One story Living Alone: Yes Support Systems: Parent, Family member(s) Patient Expects to be Discharged to[de-identified] Private residence Current DME Used/Available at Home: Brace/Splint, Grab bars, Walker, rolling Tub or Shower Type: Tub/Shower combination Hand dominance: Right EXAMINATION OF PERFORMANCE DEFICITS: 
Cognitive/Behavioral Status: 
Neurologic State: Alert; Appropriate for age Orientation Level: Oriented X4 Cognition: Appropriate decision making; Appropriate for age attention/concentration; Appropriate safety awareness; Follows commands Perception: Appears intact Perseveration: No perseveration noted Safety/Judgement: Awareness of environment;Driving appropriateness; Fall prevention;Good awareness of safety precautions; Home safety; Insight into deficits Hearing: Auditory Auditory Impairment: None Vision/Perceptual:   
Acuity: Within Defined Limits Corrective Lenses: Glasses Range of Motion: 
AROM: Within functional limits PROM: Within functional limits Strength: 
Strength: Within functional limits Coordination: 
Coordination: Within functional limits Fine Motor Skills-Upper: Left Intact; Right Intact Gross Motor Skills-Upper: Left Intact; Right Intact Tone & Sensation: 
Tone: Normal 
Sensation: Intact Balance: 
Sitting: Intact Standing: Intact Functional Mobility and Transfers for ADLs:Bed Mobility: 
Rolling: Modified independent(log roll) Supine to Sit: Modified independent Sit to Supine: Modified independent Scooting: Modified independent Transfers: 
Sit to Stand: Modified independent Stand to Sit: Modified independent Bathroom Mobility: Modified independent Toilet Transfer : Modified independent ADL Assessment: 
Feeding: Independent Oral Facial Hygiene/Grooming: Modified Independent(standing at sink) Bathing: Supervision(seated at sink for sponge bath) Upper Body Dressing: Modified independent(inclusing LSO brace) Lower Body Dressing: Modified independent(using crossed leg technique to reach feet) Toileting: Modified independent ADL Intervention and task modifications: 
Bathing: Patient instructed and indicated understanding when bathing to not submerge wound in water, stand to shower or sponge bathe, cover wound with plastic and tape to ensure no water reaches bandage/wound without cues. Dressing brace: Patient instructed and demonstrated to don/doff velcro on brace using dominant side, keeping non-dominant side intact. Patient instructed and demonstrated in meantime of being able to stand with back against wall to don/doff brace, to don/doff seated using lap and bed/chair surface to support brace while manipulating. Dressing lower body: Patient instructed to don brace first and on the benefits to remain seated to don all clothing to increase independence with precautions and pain management. Toileting: Patient instructed on the benefits of using flushable wet wipes and toilet tongs if decreased reach or pain for juan daniel care. Also, the benefits of a reacher to aid in clothing management. Home safety: Patient instructed and indicated understanding on home modifications and safety (raise height of ADL objects, appropriate height of chair surfaces, recliner safety, change of floor surfaces, clear pathways) to increase independence and fall prevention. Standing: Patient instructed and indicated understanding to walk up to sink/counter top/surfaces, step into walker, square off while using objects, slide objects along surfaces, to increase adherence to back precautions and fall prevention. Patient instructed to increase amount of time standing in order to increase independence and tolerance with ADLs. During prolonged standing, can open cabinet door or place foot on stool to decrease spinal pressure/increase pain. Tub transfer: Patient instructed and indicated understanding regarding when it is safe to begin transfer into tub (complete stairs with PT, advance exercises with PT high enough to clear tub height, and while clothes donned practice with another person present). Patient instructed and indicated understanding to use the same technique as used with stairs when entering and exiting tub (\"up with good leg, down with bad leg\"). Cognitive Retraining Safety/Judgement: Awareness of environment;Driving appropriateness; Fall prevention;Good awareness of safety precautions; Home safety; Insight into deficits Functional Measure: 
Barthel Index: 
 
Bathin Bladder: 0(stewart) Bowels: 10 
Groomin Dressing: 10 Feeding: 10 Mobility: 10 Stairs: 5 Toilet Use: 10 Transfer (Bed to Chair and Back): 15 Total: 75 Barthel and G-code impairment scale: 
Percentage of impairment CH 
0% CI 
1-19% CJ 
20-39% CK 
40-59% CL 
60-79% CM 
80-99% CN 
100% Barthel Score 0-100 100 99-80 79-60 59-40 20-39 1-19 
 0 Barthel Score 0-20 20 17-19 13-16 9-12 5-8 1-4 0 The Barthel ADL Index: Guidelines 1. The index should be used as a record of what a patient does, not as a record of what a patient could do. 2. The main aim is to establish degree of independence from any help, physical or verbal, however minor and for whatever reason. 3. The need for supervision renders the patient not independent. 4. A patient's performance should be established using the best available evidence. Asking the patient, friends/relatives and nurses are the usual sources, but direct observation and common sense are also important. However direct testing is not needed. 5. Usually the patient's performance over the preceding 24-48 hours is important, but occasionally longer periods will be relevant. 6. Middle categories imply that the patient supplies over 50 per cent of the effort. 7. Use of aids to be independent is allowed. Arland Kocher., Barthel, D.W. (7508). Functional evaluation: the Barthel Index. [address] (14)2. [facility] CECY Calderon, Sarah De Souza., Agustin Roman., Kathleen, [address] (1999). Measuring the change indisability after inpatient rehabilitation; comparison of the responsiveness of the Barthel Index and Functional Emmet Measure. Journal of Neurology, Neurosurgery, and Psychiatry, 66(4), 106-626. Chidi Romero, NSOTO.A, BRODIE Adams, & Maggy Childers M.A. (2004.) Assessment of post-stroke quality of life in cost-effectiveness studies: The usefulness of the Barthel Index and the EuroQoL-5D. [facility], 13, 342-00 G codes: In compliance with CMSs Claims Based Outcome Reporting, the following G-code set was chosen for this patient based on their primary functional limitation being treated: The outcome measure chosen to determine the severity of the functional limitation was the Barthel Index with a score of 75/100 which was correlated with the impairment scale. ? Self Care:  
  - CURRENT STATUS: CJ - 20%-39% impaired, limited or restricted  - GOAL STATUS: CJ - 20%-39% impaired, limited or restricted  - D/C STATUS:  CJ - 20%-39% impaired, limited or restricted Occupational Therapy Evaluation Charge Determination History Examination Decision-Making LOW Complexity : Brief history review  LOW Complexity : 1-3 performance deficits relating to physical, cognitive , or psychosocial skils that result in activity limitations and / or participation restrictions  LOW Complexity : No comorbidities that affect functional and no verbal or physical assistance needed to complete eval tasks Based on the above components, the patient evaluation is determined to be of the following complexity level: LOW Pain: Pain Scale 1: Numeric (0 - 10) Pain Intensity 1: 4 Pain Location 1: Back Pain Orientation 1: Anterior Pain Description 1: Aching Pain Intervention(s) 1: Medication (see MAR) Activity Tolerance:  
Fair-good Please refer to the flowsheet for vital signs taken during this treatment. After treatment:  
[]  Patient left in no apparent distress sitting up in chair 
[x]  Patient left in no apparent distress seated edge of bed 
[x]  Call bell left within reach [x]  Nursing notified 
[]  Caregiver present 
[]  Bed alarm activated COMMUNICATION/EDUCATION:  
Communication/Collaboration: 
[x]      Home safety education was provided and the patient/caregiver indicated understanding. [x]      Patient/family have participated as able and agree with findings and recommendations. []      Patient is unable to participate in plan of care at this time. Findings and recommendations were discussed with: Registered Nurse Ragini Bruce OT Time Calculation: 29 mins

## 2018-10-19 NOTE — PROGRESS NOTES
Bedside shift change report given to Georgina Marin, RN (oncoming nurse) by [address] [place], [place] [address]krishan Hsu, RN (offgoing nurse). Report included the following information SBAR, Kardex, ED Summary, Procedure Summary, Intake/Output, MAR, Accordion, Recent Results and Cardiac Rhythm NSR.

## 2018-10-19 NOTE — PROGRESS NOTES
Neurosurgery Progress Note Carlos AGACNP-BC 
[phone] Admit Date: 10/14/2018 LOS: 5 days Daily Progress Note: 10/19/2018 POD:3 Day Post-Op S/P: Procedure(s): WOUND EXPLORATION , PLACEMENT OF LUMBAR DRAIN Subjective: The patient had an L3-4 fusion with exploration of a previous L4-S1 fusion on 18 by Dr. Pily Velez. The patient had a durotomy during surgery, but this was repaired primarily. She did well post-operatively and reported she was progressing at home to ambulating without a walker or a cane the past 10 days. Unfortunately, she fell out of bed by rolling off of it in the middle of the night because she thought her dog was sleeping in front of her and not behind her. Around 48 hours after that incident, she began having positional headaches that were so severe, she could only ambulate to the bathroom because she had to lie back down due to her headache. She did not have any drainage from her wound. She was admitted to the hospital and an MRI was completed, which showed a large fluid collection posterior to the dural sac. The patient was taken to the operating room for a wound exploration and lumbar drain placement on 10/16/18 with Dr. Pily Velez. Patient doing well. She has no headaches when sitting up at EOB. Lumbar drain functioning. We will clamp drain today and mobilize with therapy. Denies numbness, tingling, chest pain, leg pain, nausea, vomiting, difficulty swallowing, headache, and dyspnea. Objective:  
 
Vital signs Temp (24hrs), Av.3 °F (36.8 °C), Min:97.5 °F (36.4 °C), Max:98.8 °F (37.1 °C) 
 10/19 0701 - 10/19 1900 In: -  
Out: 840 [Urine:800; Drains:40]  10/17 1901 - 10/19 0700 In: 840 [P.O.:840] Out: 3764 [Urine:4350; GGWNDC:361] Visit Vitals /56 (BP 1 Location: Right arm, BP Patient Position: At rest) Pulse 84 Temp 98 °F (36.7 °C) Resp 12 Ht 5' 4\" (1.626 m) Wt 74.2 kg (163 lb 9.3 oz) SpO2 98% Breastfeeding? No  
BMI 28.08 kg/m² O2 Flow Rate (L/min): 2 l/min O2 Device: Room air Pain control Pain Assessment Pain Scale 1: Numeric (0 - 10) Pain Intensity 1: 0 Pain Onset 1: post op Pain Location 1: Back Pain Orientation 1: Mid 
Pain Description 1: Aching Pain Intervention(s) 1: Repositioned PT/OT Gait Physical Exam: 
Gen:NAD. Neuro: A&Ox3. Follows commands. Speech clear. Affect normal. 
BOWER spontaneously. Strength 5/5 LUE/LLE 4+/5 RUE/RLE (baseline) Gait deferred. Skin: Lumbar dressing C/D/I. Lumbar drain in place draining 10 cc/hr CSF 
 
24 hour results: No results found for this or any previous visit (from the past 24 hour(s)). Assessment:  
 
Active Problems: 
  Back pain (10/14/2018) Plan: 1. CSF leak - s/p wound exploration and lumbar drain placement 10/16 
 - clamp lumbar drain today 
 - mobilize with PT/OT 
 - Pain control with gabapentin, oxycodone PRN 2. HTN 
 - Cont Hyzaar from home 3. Dyslipidemia 
 - Cont statin from home 4. GERD 
 - Cont PPI from home 5. Obesity - BMI 31.1 
 - Counseling as able 6. Hypothyroidism 
 - Cont levothyroxine from home Activity: up with assistance DVT ppx: SCDs Dispo: tbd 
Plan d/w Dr. Kai Louise, [address] [place] [address] nurse Mia Fernandes, FE

## 2018-10-19 NOTE — ROUTINE PROCESS
Bedside shift change report given to [address] [place] [address] 107 (oncoming nurse) by Usha Moore RN (offgoing nurse). Report included the following information SBAR, Kardex, ED Summary, OR Summary, Procedure Summary, Intake/Output, MAR, Accordion, Recent Results and Cardiac Rhythm NSR/ST.

## 2018-10-19 NOTE — PROGRESS NOTES
POD3 
No complaints, no headache  
afeb VSS, LD - 10cc/hr Sitting up on side of bed Full strength LE 
Dressing C/D/I 
S/p: wound exploration for CSF leak Clamp lumbar drain Okay for OOB If no headache, okay to d/c lumbar drain tomorrow and d/c home If headache returns will resume bedrest and open lumbar drain 10cc/hr

## 2018-10-19 NOTE — PROGRESS NOTES
Problem: Falls - Risk of 
Goal: *Absence of Falls Document Kennedy Winslow Fall Risk and appropriate interventions in the flowsheet. Outcome: Progressing Towards Goal 
Fall Risk Interventions: 
Mobility Interventions: Assess mobility with egress test, Communicate number of staff needed for ambulation/transfer, Mechanical lift, OT consult for ADLs, Patient to call before getting OOB, PT Consult for mobility concerns, PT Consult for assist device competence, Strengthening exercises (ROM-active/passive), Utilize walker, cane, or other assistive device Medication Interventions: Assess postural VS orthostatic hypotension, Evaluate medications/consider consulting pharmacy, Patient to call before getting OOB, Teach patient to arise slowly Elimination Interventions: Call light in reach, Patient to call for help with toileting needs, Toilet paper/wipes in reach, Toileting schedule/hourly rounds History of Falls Interventions: Consult care management for discharge planning, Door open when patient unattended, Evaluate medications/consider consulting pharmacy, Room close to nurse's station, Investigate reason for fall Problem: Pressure Injury - Risk of 
Goal: *Prevention of pressure injury Document Navdeep Scale and appropriate interventions in the flowsheet. Outcome: Progressing Towards Goal 
Pressure Injury Interventions: Activity Interventions: Assess need for specialty bed, Increase time out of bed, Pressure redistribution bed/mattress(bed type), PT/OT evaluation Mobility Interventions: Assess need for specialty bed, Float heels, HOB 30 degrees or less, Pressure redistribution bed/mattress (bed type), PT/OT evaluation, Turn and reposition approx. every two hours(pillow and wedges) Nutrition Interventions: Document food/fluid/supplement intake, Offer support with meals,snacks and hydration

## 2018-10-20 VITALS
HEART RATE: 107 BPM | HEIGHT: 64 IN | DIASTOLIC BLOOD PRESSURE: 66 MMHG | TEMPERATURE: 98 F | SYSTOLIC BLOOD PRESSURE: 129 MMHG | WEIGHT: 180.34 LBS | BODY MASS INDEX: 30.79 KG/M2 | OXYGEN SATURATION: 100 % | RESPIRATION RATE: 13 BRPM

## 2018-10-20 PROCEDURE — 74011250637 HC RX REV CODE- 250/637: Performed by: NEUROLOGICAL SURGERY

## 2018-10-20 PROCEDURE — 74011250637 HC RX REV CODE- 250/637: Performed by: NURSE PRACTITIONER

## 2018-10-20 PROCEDURE — 94760 N-INVAS EAR/PLS OXIMETRY 1: CPT

## 2018-10-20 RX ADMIN — GABAPENTIN 300 MG: 300 CAPSULE ORAL at 08:26

## 2018-10-20 RX ADMIN — OXYCODONE HYDROCHLORIDE 5 MG: 5 TABLET ORAL at 15:05

## 2018-10-20 RX ADMIN — ESTRADIOL 0.5 MG: 1 TABLET ORAL at 08:26

## 2018-10-20 RX ADMIN — CETIRIZINE HYDROCHLORIDE 10 MG: 10 TABLET, FILM COATED ORAL at 08:26

## 2018-10-20 RX ADMIN — Medication 10 ML: at 06:57

## 2018-10-20 RX ADMIN — CYCLOBENZAPRINE 10 MG: 10 TABLET, FILM COATED ORAL at 09:39

## 2018-10-20 RX ADMIN — VENLAFAXINE HYDROCHLORIDE 150 MG: 150 CAPSULE, EXTENDED RELEASE ORAL at 08:26

## 2018-10-20 RX ADMIN — PANTOPRAZOLE SODIUM 40 MG: 40 TABLET, DELAYED RELEASE ORAL at 06:57

## 2018-10-20 RX ADMIN — OXYCODONE HYDROCHLORIDE 5 MG: 5 TABLET ORAL at 09:39

## 2018-10-20 RX ADMIN — DOCUSATE SODIUM 100 MG: 100 CAPSULE, LIQUID FILLED ORAL at 08:26

## 2018-10-20 RX ADMIN — LOSARTAN POTASSIUM: 50 TABLET, FILM COATED ORAL at 08:25

## 2018-10-20 RX ADMIN — POTASSIUM CHLORIDE 10 MEQ: 750 TABLET, FILM COATED, EXTENDED RELEASE ORAL at 08:26

## 2018-10-20 NOTE — PROGRESS NOTES
Bedside RN performed patient education and medication education. Discharge concerns initiated and discussed with patient, including clarification on \"who\" assists the patient at their home and instructions for when the home going patient should call their provider after discharge. Opportunity for questions and clarification was provided. Patient receptive to education: YES Patient stated: I got it Barriers to Education: n/a Diagnosis Education given:  YES Length of stay: 6 Expected Day of Discharge: 6 Ask if they have \"Help at Home\" & add to white board? YES Education Day #: 6 Medication Education Given:  YES 
M in the box Medication name: roxicodone Pt aware of [facility]CA[facility]PS survey: YES

## 2018-10-20 NOTE — PROGRESS NOTES
Looks and feels well no headache  OOB, ambulating so I removed the drain  She will go home later today and f/u with Dr Kelli Rinaldi in AM

## 2018-10-25 NOTE — DISCHARGE SUMMARY
[address] [place] [address]  [phone]     Discharge Summary       PATIENT ID: Landy Moreno  MRN: [id number]   YOB: 1960    DATE OF ADMISSION: 10/14/2018  7:12 PM    DATE OF DISCHARGE: 10/20/2018  PRIMARY CARE PROVIDER: Angeles Hinton MD     CONSULTATIONS: None    PROCEDURES/SURGERIES: Procedure(s):  WOUND EXPLORATION , PLACEMENT OF LUMBAR DRAIN    History of Present Illness: Landy Jansen Is a 63 yo female who underwent a L3-4 fusion with exploration of a previous L4-S1 fusion on 09/18/18 by Dr. Lisa Lyle. The patient had a durotomy during surgery, but this was repaired primarily. She did well post-operatively and reported she was progressing at home to ambulating without a walker or a cane the past 10 days. Unfortunately, she fell out of bed by rolling off of it in the middle of the night because she thought her dog was sleeping in front of her and not behind her. Around 48 hours after that incident, she began having positional headaches that were so severe she could only ambulate to the bathroom. She would lie back down and the headaches would resolve. She did not have any drainage from her wound. She was admitted to the hospital and an MRI was completed, which showed a large fluid collection posterior to the dural sac. After a discussion of the risks, benefits, alternatives, perioperative course, and potential complications of surgery, she consented to undergo a Procedure(s):  WOUND EXPLORATION , PLACEMENT OF LUMBAR DRAIN. Hospital Course:  Landy Moreno tolerated the procedure well. A lumbar drain was placed. She was transferred  to the recovery room in stable condition.   After a brief stay the patient was then transferred to the Neuroscience Telemetry Unit [address] [place] [address].  On postoperative day #1, she was kept on bedrest. The dressing was clean and dry, she was neurovascularly intact. The patient was afebrile and vital signs were stable. Calves were soft and non-tender bilaterally. CSF was drained at a rate of 10 cc an hour. She was kept flat in bed until postoperative day #2 when she begin to have her head of bed raised. On postoperative day #3 her drain was clamped she was mobilized out of bed. She had no headaches. Her lumbar drain was removed on postoperative day #4. She was tolerating a regular diet and voiding. Hemoglobin prior to discharge were   Lab Results   Component Value Date/Time    HGB 13.4 10/14/2018 07:58 PM        Georgi Avila made satisfactory progress with physical therapy and was discharged to Home in stable condition on postoperative day 4. She was provided with routine postoperative instructions and advised to follow up with Shruthi Cárdenas MD in 2 weeks following discharge from the hospital.        FOLLOW UP APPOINTMENTS:    Follow-up Information     Follow up With Specialties Details Why Contact Info    Wild Berrios MD Internal Medicine   [address] [facility] [address]  [address] [facility]  [phone]             DISCHARGE MEDICATIONS:  Discharge Medication List as of 10/20/2018  1:04 PM      START taking these medications    Details   oxyCODONE IR (ROXICODONE) 5 mg immediate release tablet Take 1-2 Tabs by mouth every four (4) hours as needed. Max Daily Amount: 60 mg., Print, Disp-40 Tab, R-0         CONTINUE these medications which have NOT CHANGED    Details   venlafaxine-SR (EFFEXOR-XR) 150 mg capsule Take 150 mg by mouth daily. , Historical Med      albuterol (PROVENTIL HFA, VENTOLIN HFA, PROAIR HFA) 90 mcg/actuation inhaler Take 1 Puff by inhalation every four (4) hours as needed for Wheezing., Historical Med      potassium chloride (KLOR-CON) 10 mEq tablet Take 10 mEq by mouth daily. , Historical Med      cyclobenzaprine (FLEXERIL) 10 mg tablet Take 10 mg by mouth three (3) times daily as needed for Muscle Spasm(s). , Historical Med zafirlukast (ACCOLATE) 20 mg tablet Take 20 mg by mouth two (2) times a day., Historical Med      losartan-hydroCHLOROthiazide (HYZAAR) 100-25 mg per tablet Take 1 Tab by mouth daily. , Historical Med      Venlafaxine 150 mg tr24 Take 150 mg by mouth daily. , Historical Med      !! levothyroxine (SYNTHROID) 50 mcg tablet Take 50 mcg by mouth Daily (before breakfast). Indications: sunday only, Historical Med      zolpidem CR (AMBIEN CR) 12.5 mg tablet Take 12.5 mg by mouth nightly as needed for Sleep., Historical Med      Cetirizine (ZYRTEC) 10 mg Cap Take 10 mg by mouth daily. , Historical Med      omeprazole (PRILOSEC) 20 mg capsule Take 20 mg by mouth daily. , Historical Med      estradiol (ESTRACE) 0.5 mg tablet Take 0.5 mg by mouth daily. , Historical Med      atorvastatin (LIPITOR) 40 mg tablet Take 40 mg by mouth nightly., Historical Med      gabapentin (NEURONTIN) 300 mg capsule Take 300 mg by mouth two (2) times a day., Historical Med      !! levothyroxine (SYNTHROID) 100 mcg tablet Take 100 mcg by mouth Daily (before breakfast). Indications: mon through sat, Historical Med       !! - Potential duplicate medications found. Please discuss with provider. STOP taking these medications       oxyCODONE-acetaminophen (PERCOCET) 5-325 mg per tablet Comments:   Reason for Stopping:               PHYSICAL EXAMINATION AT DISCHARGE:  General: Pleasant, alert, cooperative, no distress. Resp: Equal chest expansion. No accessory muscle use. CV:  No cyanosis or clubbing. No edema appreciated in the extremities. Neurological: Follows commands. BOWER. Speech clear. Sensation intact to light touch. Motor: unchanged  L2-S1. Musculoskeletal:  Calves soft, non-tender upon palpation or with passive stretch. Skin: No obvious rashes or lesions. Incision - clean, dry and intact. No significant erythema or swelling.       CHRONIC MEDICAL DIAGNOSES:  Problem List as of 10/20/2018 Date Reviewed: 9/18/2018          Codes Class Noted - Resolved    Back pain ICD-10-CM: M54.9  ICD-9-CM: 724.5  10/14/2018 - Present        Lumbar stenosis with neurogenic claudication ICD-10-CM: M48.062  ICD-9-CM: 724.03  9/18/2018 - Present        Unspecified essential hypertension ICD-10-CM: I10  ICD-9-CM: 401.9  3/28/2014 - Present        Cervical disc disorder with radiculopathy of cervical region ICD-10-CM: M50.10  ICD-9-CM: 723.4  1/31/2012 - Present              Signed:   Shayy Kunz NP  10/25/2018  4:45 PM

## 2018-12-07 ENCOUNTER — HOSPITAL ENCOUNTER (OUTPATIENT)
Dept: GENERAL RADIOLOGY | Age: 58
Discharge: HOME | End: 2018-12-07
Attending: NEUROLOGICAL SURGERY
Payer: COMMERCIAL

## 2018-12-07 DIAGNOSIS — M47.816 LUMBAR SPONDYLOSIS: ICD-10-CM

## 2018-12-07 PROCEDURE — 72114 X-RAY EXAM L-S SPINE BENDING: CPT

## 2018-12-07 PROCEDURE — 72120 X-RAY BEND ONLY L-S SPINE: CPT

## 2019-05-06 ENCOUNTER — HOSPITAL ENCOUNTER (OUTPATIENT)
Dept: MRI IMAGING | Age: 59
Discharge: HOME | End: 2019-05-06
Attending: NEUROLOGICAL SURGERY
Payer: COMMERCIAL

## 2019-05-06 DIAGNOSIS — G95.9 MYELOPATHY (HCC): ICD-10-CM

## 2019-05-06 PROCEDURE — 72146 MRI CHEST SPINE W/O DYE: CPT

## 2019-05-06 PROCEDURE — 72141 MRI NECK SPINE W/O DYE: CPT

## 2019-05-28 ENCOUNTER — HOSPITAL ENCOUNTER (OUTPATIENT)
Dept: PREADMISSION TESTING | Age: 59
Discharge: HOME | End: 2019-05-28
Payer: COMMERCIAL

## 2019-05-28 VITALS
TEMPERATURE: 97.8 F | HEIGHT: 65 IN | SYSTOLIC BLOOD PRESSURE: 143 MMHG | DIASTOLIC BLOOD PRESSURE: 75 MMHG | BODY MASS INDEX: 30.49 KG/M2 | HEART RATE: 76 BPM | WEIGHT: 183 LBS

## 2019-05-28 LAB
ANION GAP SERPL CALC-SCNC: 8 MMOL/L (ref 5–15)
ATRIAL RATE: 75 BPM
BUN SERPL-MCNC: 11 MG/DL (ref 6–20)
BUN/CREAT SERPL: 12 (ref 12–20)
CALCIUM SERPL-MCNC: 8.8 MG/DL (ref 8.5–10.1)
CALCULATED P AXIS, ECG09: 32 DEGREES
CALCULATED R AXIS, ECG10: 54 DEGREES
CALCULATED T AXIS, ECG11: 72 DEGREES
CHLORIDE SERPL-SCNC: 101 MMOL/L (ref 97–108)
CO2 SERPL-SCNC: 30 MMOL/L (ref 21–32)
CREAT SERPL-MCNC: 0.89 MG/DL (ref 0.55–1.02)
DIAGNOSIS, 93000: NORMAL
ERYTHROCYTE [DISTWIDTH] IN BLOOD BY AUTOMATED COUNT: 13.2 % (ref 11.5–14.5)
GLUCOSE SERPL-MCNC: 94 MG/DL (ref 65–100)
HCT VFR BLD AUTO: 40.4 % (ref 35–47)
HGB BLD-MCNC: 13.3 G/DL (ref 11.5–16)
MCH RBC QN AUTO: 28.7 PG (ref 26–34)
MCHC RBC AUTO-ENTMCNC: 32.9 G/DL (ref 30–36.5)
MCV RBC AUTO: 87.1 FL (ref 80–99)
NRBC # BLD: 0 K/UL (ref 0–0.01)
NRBC BLD-RTO: 0 PER 100 WBC
P-R INTERVAL, ECG05: 158 MS
PLATELET # BLD AUTO: 305 K/UL (ref 150–400)
PMV BLD AUTO: 9.6 FL (ref 8.9–12.9)
POTASSIUM SERPL-SCNC: 3 MMOL/L (ref 3.5–5.1)
Q-T INTERVAL, ECG07: 402 MS
QRS DURATION, ECG06: 90 MS
QTC CALCULATION (BEZET), ECG08: 448 MS
RBC # BLD AUTO: 4.64 M/UL (ref 3.8–5.2)
SODIUM SERPL-SCNC: 139 MMOL/L (ref 136–145)
VENTRICULAR RATE, ECG03: 75 BPM
WBC # BLD AUTO: 8 K/UL (ref 3.6–11)

## 2019-05-28 PROCEDURE — 36415 COLL VENOUS BLD VENIPUNCTURE: CPT

## 2019-05-28 PROCEDURE — 80048 BASIC METABOLIC PNL TOTAL CA: CPT

## 2019-05-28 PROCEDURE — 93005 ELECTROCARDIOGRAM TRACING: CPT

## 2019-05-28 PROCEDURE — 85027 COMPLETE CBC AUTOMATED: CPT

## 2019-05-28 NOTE — PERIOP NOTES
PREOP, VERBAL & WRITTEN INSTRUCTIONS GIVEN TO PT. PATIENT GIVEN SURGICAL SITE INFECTION FAQs HANDOUT. PT GIVEN 2 BOTTLES OF CHG SOL WITH VERBAL & WRITTEN INSTRUCTIONS. PT GIVEN OPPORTUNITY TO EXPRESS CONCERNS & ASK QUESTIONS.

## 2019-05-29 LAB
BACTERIA SPEC CULT: NORMAL
BACTERIA SPEC CULT: NORMAL
SERVICE CMNT-IMP: NORMAL

## 2019-05-30 NOTE — ADVANCED PRACTICE NURSE
Patient called to inform of K 3.0. She states she has only been taking K 10 mEq once daily, but when K is low she increases to bid. Advised to increase to bid. She verbalized understanding. PAT labs forwarded to PCP for continuity of care.

## 2019-06-04 ENCOUNTER — ANESTHESIA (OUTPATIENT)
Dept: SURGERY | Age: 59
End: 2019-06-04
Payer: COMMERCIAL

## 2019-06-04 ENCOUNTER — HOSPITAL ENCOUNTER (OUTPATIENT)
Age: 59
Setting detail: OBSERVATION
Discharge: HOME | End: 2019-06-05
Attending: NEUROLOGICAL SURGERY | Admitting: NEUROLOGICAL SURGERY
Payer: COMMERCIAL

## 2019-06-04 ENCOUNTER — ANESTHESIA EVENT (OUTPATIENT)
Dept: SURGERY | Age: 59
End: 2019-06-04
Payer: COMMERCIAL

## 2019-06-04 ENCOUNTER — APPOINTMENT (OUTPATIENT)
Dept: GENERAL RADIOLOGY | Age: 59
End: 2019-06-04
Attending: NEUROLOGICAL SURGERY
Payer: COMMERCIAL

## 2019-06-04 DIAGNOSIS — M50.20 HERNIATED CERVICAL DISC: Primary | ICD-10-CM

## 2019-06-04 LAB
GLUCOSE BLD STRIP.AUTO-MCNC: 125 MG/DL (ref 65–100)
SERVICE CMNT-IMP: ABNORMAL

## 2019-06-04 PROCEDURE — 74011250637 HC RX REV CODE- 250/637: Performed by: NEUROLOGICAL SURGERY

## 2019-06-04 PROCEDURE — 77030003666 HC NDL SPINAL BD -A: Performed by: NEUROLOGICAL SURGERY

## 2019-06-04 PROCEDURE — 99218 HC RM OBSERVATION: CPT

## 2019-06-04 PROCEDURE — C1713 ANCHOR/SCREW BN/BN,TIS/BN: HCPCS | Performed by: NEUROLOGICAL SURGERY

## 2019-06-04 PROCEDURE — 77030032490 HC SLV COMPR SCD KNE COVD -B: Performed by: NEUROLOGICAL SURGERY

## 2019-06-04 PROCEDURE — 74011000250 HC RX REV CODE- 250: Performed by: NEUROLOGICAL SURGERY

## 2019-06-04 PROCEDURE — 51798 US URINE CAPACITY MEASURE: CPT

## 2019-06-04 PROCEDURE — 74011250636 HC RX REV CODE- 250/636: Performed by: NEUROLOGICAL SURGERY

## 2019-06-04 PROCEDURE — 77030020782 HC GWN BAIR PAWS FLX 3M -B

## 2019-06-04 PROCEDURE — 77030019908 HC STETH ESOPH SIMS -A: Performed by: NURSE ANESTHETIST, CERTIFIED REGISTERED

## 2019-06-04 PROCEDURE — 77030004391 HC BUR FLUT MEDT -C: Performed by: NEUROLOGICAL SURGERY

## 2019-06-04 PROCEDURE — 74011000272 HC RX REV CODE- 272: Performed by: NEUROLOGICAL SURGERY

## 2019-06-04 PROCEDURE — 74011250636 HC RX REV CODE- 250/636

## 2019-06-04 PROCEDURE — 74011250637 HC RX REV CODE- 250/637: Performed by: ANESTHESIOLOGY

## 2019-06-04 PROCEDURE — 77030002933 HC SUT MCRYL J&J -A: Performed by: NEUROLOGICAL SURGERY

## 2019-06-04 PROCEDURE — 77030018836 HC SOL IRR NACL ICUM -A: Performed by: NEUROLOGICAL SURGERY

## 2019-06-04 PROCEDURE — 76210000017 HC OR PH I REC 1.5 TO 2 HR: Performed by: NEUROLOGICAL SURGERY

## 2019-06-04 PROCEDURE — 77030026438 HC STYL ET INTUB CARD -A: Performed by: NURSE ANESTHETIST, CERTIFIED REGISTERED

## 2019-06-04 PROCEDURE — 74011250636 HC RX REV CODE- 250/636: Performed by: ANESTHESIOLOGY

## 2019-06-04 PROCEDURE — 77030003029 HC SUT VCRL J&J -B: Performed by: NEUROLOGICAL SURGERY

## 2019-06-04 PROCEDURE — 77030011267 HC ELECTRD BLD COVD -A: Performed by: NEUROLOGICAL SURGERY

## 2019-06-04 PROCEDURE — 77030008684 HC TU ET CUF COVD -B: Performed by: NURSE ANESTHETIST, CERTIFIED REGISTERED

## 2019-06-04 PROCEDURE — 77030040424 HC CGE CERV SPN ANATOMIC PTC MEDT -G: Performed by: NEUROLOGICAL SURGERY

## 2019-06-04 PROCEDURE — 76010000172 HC OR TIME 2.5 TO 3 HR INTENSV-TIER 1: Performed by: NEUROLOGICAL SURGERY

## 2019-06-04 PROCEDURE — 77030029099 HC BN WAX SSPC -A: Performed by: NEUROLOGICAL SURGERY

## 2019-06-04 PROCEDURE — 72040 X-RAY EXAM NECK SPINE 2-3 VW: CPT

## 2019-06-04 PROCEDURE — 77030038600 HC TU BPLR IRR DISP STRY -B: Performed by: NEUROLOGICAL SURGERY

## 2019-06-04 PROCEDURE — 76060000036 HC ANESTHESIA 2.5 TO 3 HR: Performed by: NEUROLOGICAL SURGERY

## 2019-06-04 PROCEDURE — 77030014647 HC SEAL FBRN TISSL BAXT -D: Performed by: NEUROLOGICAL SURGERY

## 2019-06-04 PROCEDURE — 94762 N-INVAS EAR/PLS OXIMTRY CONT: CPT

## 2019-06-04 PROCEDURE — 74011000250 HC RX REV CODE- 250

## 2019-06-04 PROCEDURE — 82962 GLUCOSE BLOOD TEST: CPT

## 2019-06-04 PROCEDURE — 77010033678 HC OXYGEN DAILY

## 2019-06-04 PROCEDURE — 77030012893

## 2019-06-04 DEVICE — SCREW 7713515 ZEVO VAR SD 3.5MM X 15MM
Type: IMPLANTABLE DEVICE | Site: SPINE CERVICAL | Status: FUNCTIONAL
Brand: ZEVO™ ANTERIOR CERVICAL PLATE SYSTEM

## 2019-06-04 DEVICE — CAGE 5030741 ANATOMIC PTC 14X11X7MM
Type: IMPLANTABLE DEVICE | Site: SPINE CERVICAL | Status: FUNCTIONAL
Brand: ANATOMIC PEEK PTC CERVICAL FUSION SYSTEM

## 2019-06-04 DEVICE — GRAFT BNE SUB SM CANC FRZN MORSELIZED W/ VIABLE CELL: Type: IMPLANTABLE DEVICE | Site: SPINE CERVICAL | Status: FUNCTIONAL

## 2019-06-04 RX ORDER — PHENYLEPHRINE HCL IN 0.9% NACL 0.4MG/10ML
SYRINGE (ML) INTRAVENOUS AS NEEDED
Status: DISCONTINUED | OUTPATIENT
Start: 2019-06-04 | End: 2019-06-04 | Stop reason: HOSPADM

## 2019-06-04 RX ORDER — NEOSTIGMINE METHYLSULFATE 1 MG/ML
INJECTION INTRAVENOUS AS NEEDED
Status: DISCONTINUED | OUTPATIENT
Start: 2019-06-04 | End: 2019-06-04 | Stop reason: HOSPADM

## 2019-06-04 RX ORDER — SODIUM CHLORIDE 0.9 % (FLUSH) 0.9 %
5-40 SYRINGE (ML) INJECTION EVERY 8 HOURS
Status: DISCONTINUED | OUTPATIENT
Start: 2019-06-04 | End: 2019-06-04 | Stop reason: HOSPADM

## 2019-06-04 RX ORDER — CYCLOBENZAPRINE HCL 10 MG
10 TABLET ORAL
Status: DISCONTINUED | OUTPATIENT
Start: 2019-06-04 | End: 2019-06-05 | Stop reason: HOSPADM

## 2019-06-04 RX ORDER — ESTRADIOL 1 MG/1
0.5 TABLET ORAL DAILY
Status: DISCONTINUED | OUTPATIENT
Start: 2019-06-05 | End: 2019-06-05 | Stop reason: HOSPADM

## 2019-06-04 RX ORDER — OXYCODONE HYDROCHLORIDE 5 MG/1
10 TABLET ORAL
Status: DISCONTINUED | OUTPATIENT
Start: 2019-06-04 | End: 2019-06-05 | Stop reason: HOSPADM

## 2019-06-04 RX ORDER — FENTANYL CITRATE 50 UG/ML
50 INJECTION, SOLUTION INTRAMUSCULAR; INTRAVENOUS AS NEEDED
Status: DISCONTINUED | OUTPATIENT
Start: 2019-06-04 | End: 2019-06-04 | Stop reason: HOSPADM

## 2019-06-04 RX ORDER — SODIUM CHLORIDE, SODIUM LACTATE, POTASSIUM CHLORIDE, CALCIUM CHLORIDE 600; 310; 30; 20 MG/100ML; MG/100ML; MG/100ML; MG/100ML
25 INJECTION, SOLUTION INTRAVENOUS CONTINUOUS
Status: DISCONTINUED | OUTPATIENT
Start: 2019-06-04 | End: 2019-06-04 | Stop reason: HOSPADM

## 2019-06-04 RX ORDER — LEVOTHYROXINE SODIUM 100 UG/1
100 TABLET ORAL
Status: DISCONTINUED | OUTPATIENT
Start: 2019-06-05 | End: 2019-06-05 | Stop reason: HOSPADM

## 2019-06-04 RX ORDER — MIDAZOLAM HYDROCHLORIDE 1 MG/ML
INJECTION, SOLUTION INTRAMUSCULAR; INTRAVENOUS AS NEEDED
Status: DISCONTINUED | OUTPATIENT
Start: 2019-06-04 | End: 2019-06-04 | Stop reason: HOSPADM

## 2019-06-04 RX ORDER — ALBUTEROL SULFATE 0.83 MG/ML
2.5 SOLUTION RESPIRATORY (INHALATION)
Status: DISCONTINUED | OUTPATIENT
Start: 2019-06-04 | End: 2019-06-05 | Stop reason: HOSPADM

## 2019-06-04 RX ORDER — LEVOTHYROXINE SODIUM 50 UG/1
50 TABLET ORAL
Status: DISCONTINUED | OUTPATIENT
Start: 2019-06-05 | End: 2019-06-04 | Stop reason: ALTCHOICE

## 2019-06-04 RX ORDER — FENTANYL CITRATE 50 UG/ML
25 INJECTION, SOLUTION INTRAMUSCULAR; INTRAVENOUS
Status: COMPLETED | OUTPATIENT
Start: 2019-06-04 | End: 2019-06-04

## 2019-06-04 RX ORDER — TRAMADOL HYDROCHLORIDE 50 MG/1
50 TABLET ORAL
Status: DISCONTINUED | OUTPATIENT
Start: 2019-06-04 | End: 2019-06-05 | Stop reason: HOSPADM

## 2019-06-04 RX ORDER — ONDANSETRON 2 MG/ML
INJECTION INTRAMUSCULAR; INTRAVENOUS AS NEEDED
Status: DISCONTINUED | OUTPATIENT
Start: 2019-06-04 | End: 2019-06-04 | Stop reason: HOSPADM

## 2019-06-04 RX ORDER — MIDAZOLAM HYDROCHLORIDE 1 MG/ML
0.5 INJECTION, SOLUTION INTRAMUSCULAR; INTRAVENOUS
Status: DISCONTINUED | OUTPATIENT
Start: 2019-06-04 | End: 2019-06-04 | Stop reason: HOSPADM

## 2019-06-04 RX ORDER — ACETAMINOPHEN 325 MG/1
650 TABLET ORAL ONCE
Status: COMPLETED | OUTPATIENT
Start: 2019-06-04 | End: 2019-06-04

## 2019-06-04 RX ORDER — HYDROMORPHONE HYDROCHLORIDE 2 MG/ML
INJECTION, SOLUTION INTRAMUSCULAR; INTRAVENOUS; SUBCUTANEOUS AS NEEDED
Status: DISCONTINUED | OUTPATIENT
Start: 2019-06-04 | End: 2019-06-04 | Stop reason: HOSPADM

## 2019-06-04 RX ORDER — CEFAZOLIN SODIUM/WATER 2 G/20 ML
2 SYRINGE (ML) INTRAVENOUS EVERY 8 HOURS
Status: COMPLETED | OUTPATIENT
Start: 2019-06-04 | End: 2019-06-05

## 2019-06-04 RX ORDER — ACETAMINOPHEN 325 MG/1
650 TABLET ORAL EVERY 6 HOURS
Status: DISCONTINUED | OUTPATIENT
Start: 2019-06-04 | End: 2019-06-05 | Stop reason: HOSPADM

## 2019-06-04 RX ORDER — GABAPENTIN 300 MG/1
300 CAPSULE ORAL 2 TIMES DAILY
Status: DISCONTINUED | OUTPATIENT
Start: 2019-06-04 | End: 2019-06-05 | Stop reason: HOSPADM

## 2019-06-04 RX ORDER — ALBUTEROL SULFATE 90 UG/1
1 AEROSOL, METERED RESPIRATORY (INHALATION)
Status: DISCONTINUED | OUTPATIENT
Start: 2019-06-04 | End: 2019-06-04 | Stop reason: CLARIF

## 2019-06-04 RX ORDER — MORPHINE SULFATE 10 MG/ML
2 INJECTION, SOLUTION INTRAMUSCULAR; INTRAVENOUS
Status: DISCONTINUED | OUTPATIENT
Start: 2019-06-04 | End: 2019-06-04 | Stop reason: HOSPADM

## 2019-06-04 RX ORDER — NALOXONE HYDROCHLORIDE 0.4 MG/ML
0.4 INJECTION, SOLUTION INTRAMUSCULAR; INTRAVENOUS; SUBCUTANEOUS AS NEEDED
Status: DISCONTINUED | OUTPATIENT
Start: 2019-06-04 | End: 2019-06-05 | Stop reason: HOSPADM

## 2019-06-04 RX ORDER — GLYCOPYRROLATE 0.2 MG/ML
INJECTION INTRAMUSCULAR; INTRAVENOUS AS NEEDED
Status: DISCONTINUED | OUTPATIENT
Start: 2019-06-04 | End: 2019-06-04 | Stop reason: HOSPADM

## 2019-06-04 RX ORDER — KETOROLAC TROMETHAMINE 30 MG/ML
INJECTION, SOLUTION INTRAMUSCULAR; INTRAVENOUS AS NEEDED
Status: DISCONTINUED | OUTPATIENT
Start: 2019-06-04 | End: 2019-06-04 | Stop reason: HOSPADM

## 2019-06-04 RX ORDER — ESMOLOL HYDROCHLORIDE 10 MG/ML
INJECTION INTRAVENOUS AS NEEDED
Status: DISCONTINUED | OUTPATIENT
Start: 2019-06-04 | End: 2019-06-04 | Stop reason: HOSPADM

## 2019-06-04 RX ORDER — SODIUM CHLORIDE 9 MG/ML
125 INJECTION, SOLUTION INTRAVENOUS CONTINUOUS
Status: DISPENSED | OUTPATIENT
Start: 2019-06-04 | End: 2019-06-05

## 2019-06-04 RX ORDER — ZOLPIDEM TARTRATE 5 MG/1
5 TABLET ORAL
Status: DISCONTINUED | OUTPATIENT
Start: 2019-06-04 | End: 2019-06-05 | Stop reason: HOSPADM

## 2019-06-04 RX ORDER — SODIUM CHLORIDE 0.9 % (FLUSH) 0.9 %
5-40 SYRINGE (ML) INJECTION AS NEEDED
Status: DISCONTINUED | OUTPATIENT
Start: 2019-06-04 | End: 2019-06-05 | Stop reason: HOSPADM

## 2019-06-04 RX ORDER — PANTOPRAZOLE SODIUM 40 MG/1
40 TABLET, DELAYED RELEASE ORAL
Status: DISCONTINUED | OUTPATIENT
Start: 2019-06-05 | End: 2019-06-05 | Stop reason: HOSPADM

## 2019-06-04 RX ORDER — CEFAZOLIN SODIUM/WATER 2 G/20 ML
2 SYRINGE (ML) INTRAVENOUS
Status: COMPLETED | OUTPATIENT
Start: 2019-06-04 | End: 2019-06-04

## 2019-06-04 RX ORDER — ROCURONIUM BROMIDE 10 MG/ML
INJECTION, SOLUTION INTRAVENOUS AS NEEDED
Status: DISCONTINUED | OUTPATIENT
Start: 2019-06-04 | End: 2019-06-04 | Stop reason: HOSPADM

## 2019-06-04 RX ORDER — POTASSIUM CHLORIDE 750 MG/1
10 TABLET, FILM COATED, EXTENDED RELEASE ORAL 2 TIMES DAILY
Status: DISCONTINUED | OUTPATIENT
Start: 2019-06-04 | End: 2019-06-05 | Stop reason: HOSPADM

## 2019-06-04 RX ORDER — SODIUM CHLORIDE, SODIUM LACTATE, POTASSIUM CHLORIDE, CALCIUM CHLORIDE 600; 310; 30; 20 MG/100ML; MG/100ML; MG/100ML; MG/100ML
125 INJECTION, SOLUTION INTRAVENOUS CONTINUOUS
Status: DISCONTINUED | OUTPATIENT
Start: 2019-06-04 | End: 2019-06-04 | Stop reason: HOSPADM

## 2019-06-04 RX ORDER — OXYCODONE HYDROCHLORIDE 5 MG/1
5 TABLET ORAL AS NEEDED
Status: DISCONTINUED | OUTPATIENT
Start: 2019-06-04 | End: 2019-06-04 | Stop reason: HOSPADM

## 2019-06-04 RX ORDER — KETAMINE HYDROCHLORIDE 10 MG/ML
INJECTION, SOLUTION INTRAMUSCULAR; INTRAVENOUS AS NEEDED
Status: DISCONTINUED | OUTPATIENT
Start: 2019-06-04 | End: 2019-06-04 | Stop reason: HOSPADM

## 2019-06-04 RX ORDER — ATORVASTATIN CALCIUM 40 MG/1
40 TABLET, FILM COATED ORAL
Status: DISCONTINUED | OUTPATIENT
Start: 2019-06-04 | End: 2019-06-05 | Stop reason: HOSPADM

## 2019-06-04 RX ORDER — ONDANSETRON 2 MG/ML
4 INJECTION INTRAMUSCULAR; INTRAVENOUS AS NEEDED
Status: DISCONTINUED | OUTPATIENT
Start: 2019-06-04 | End: 2019-06-04 | Stop reason: HOSPADM

## 2019-06-04 RX ORDER — DIPHENHYDRAMINE HYDROCHLORIDE 50 MG/ML
12.5 INJECTION, SOLUTION INTRAMUSCULAR; INTRAVENOUS AS NEEDED
Status: DISCONTINUED | OUTPATIENT
Start: 2019-06-04 | End: 2019-06-04 | Stop reason: HOSPADM

## 2019-06-04 RX ORDER — MIDAZOLAM HYDROCHLORIDE 1 MG/ML
1 INJECTION, SOLUTION INTRAMUSCULAR; INTRAVENOUS AS NEEDED
Status: DISCONTINUED | OUTPATIENT
Start: 2019-06-04 | End: 2019-06-04 | Stop reason: HOSPADM

## 2019-06-04 RX ORDER — MORPHINE SULFATE 2 MG/ML
2 INJECTION, SOLUTION INTRAMUSCULAR; INTRAVENOUS
Status: DISPENSED | OUTPATIENT
Start: 2019-06-04 | End: 2019-06-05

## 2019-06-04 RX ORDER — SODIUM CHLORIDE 0.9 % (FLUSH) 0.9 %
5-40 SYRINGE (ML) INJECTION AS NEEDED
Status: DISCONTINUED | OUTPATIENT
Start: 2019-06-04 | End: 2019-06-04 | Stop reason: HOSPADM

## 2019-06-04 RX ORDER — MONTELUKAST SODIUM 10 MG/1
10 TABLET ORAL
Status: DISCONTINUED | OUTPATIENT
Start: 2019-06-04 | End: 2019-06-05 | Stop reason: HOSPADM

## 2019-06-04 RX ORDER — VENLAFAXINE HYDROCHLORIDE 150 MG/1
150 CAPSULE, EXTENDED RELEASE ORAL DAILY
Status: DISCONTINUED | OUTPATIENT
Start: 2019-06-05 | End: 2019-06-05 | Stop reason: HOSPADM

## 2019-06-04 RX ORDER — OMEPRAZOLE 20 MG/1
20 CAPSULE, DELAYED RELEASE ORAL DAILY
Status: DISCONTINUED | OUTPATIENT
Start: 2019-06-05 | End: 2019-06-04 | Stop reason: CLARIF

## 2019-06-04 RX ORDER — HYDROMORPHONE HYDROCHLORIDE 1 MG/ML
0.2 INJECTION, SOLUTION INTRAMUSCULAR; INTRAVENOUS; SUBCUTANEOUS
Status: DISCONTINUED | OUTPATIENT
Start: 2019-06-04 | End: 2019-06-04 | Stop reason: HOSPADM

## 2019-06-04 RX ORDER — SODIUM CHLORIDE 9 MG/ML
25 INJECTION, SOLUTION INTRAVENOUS CONTINUOUS
Status: DISCONTINUED | OUTPATIENT
Start: 2019-06-04 | End: 2019-06-04 | Stop reason: HOSPADM

## 2019-06-04 RX ORDER — DEXAMETHASONE SODIUM PHOSPHATE 4 MG/ML
INJECTION, SOLUTION INTRA-ARTICULAR; INTRALESIONAL; INTRAMUSCULAR; INTRAVENOUS; SOFT TISSUE AS NEEDED
Status: DISCONTINUED | OUTPATIENT
Start: 2019-06-04 | End: 2019-06-04 | Stop reason: HOSPADM

## 2019-06-04 RX ORDER — PROPOFOL 10 MG/ML
INJECTION, EMULSION INTRAVENOUS AS NEEDED
Status: DISCONTINUED | OUTPATIENT
Start: 2019-06-04 | End: 2019-06-04 | Stop reason: HOSPADM

## 2019-06-04 RX ORDER — SODIUM CHLORIDE 0.9 % (FLUSH) 0.9 %
5-40 SYRINGE (ML) INJECTION EVERY 8 HOURS
Status: DISCONTINUED | OUTPATIENT
Start: 2019-06-04 | End: 2019-06-05 | Stop reason: HOSPADM

## 2019-06-04 RX ORDER — FENTANYL CITRATE 50 UG/ML
INJECTION, SOLUTION INTRAMUSCULAR; INTRAVENOUS AS NEEDED
Status: DISCONTINUED | OUTPATIENT
Start: 2019-06-04 | End: 2019-06-04 | Stop reason: HOSPADM

## 2019-06-04 RX ADMIN — ROCURONIUM BROMIDE 50 MG: 10 INJECTION, SOLUTION INTRAVENOUS at 08:01

## 2019-06-04 RX ADMIN — OXYCODONE HYDROCHLORIDE 10 MG: 5 TABLET ORAL at 17:20

## 2019-06-04 RX ADMIN — HYDROMORPHONE HYDROCHLORIDE 0.6 MG: 2 INJECTION, SOLUTION INTRAMUSCULAR; INTRAVENOUS; SUBCUTANEOUS at 10:16

## 2019-06-04 RX ADMIN — KETAMINE HYDROCHLORIDE 30 MG: 10 INJECTION, SOLUTION INTRAMUSCULAR; INTRAVENOUS at 08:01

## 2019-06-04 RX ADMIN — DEXAMETHASONE SODIUM PHOSPHATE 8 MG: 4 INJECTION, SOLUTION INTRA-ARTICULAR; INTRALESIONAL; INTRAMUSCULAR; INTRAVENOUS; SOFT TISSUE at 08:11

## 2019-06-04 RX ADMIN — NEOSTIGMINE METHYLSULFATE 2.5 MG: 1 INJECTION INTRAVENOUS at 10:15

## 2019-06-04 RX ADMIN — FENTANYL CITRATE 25 MCG: 50 INJECTION INTRAMUSCULAR; INTRAVENOUS at 11:20

## 2019-06-04 RX ADMIN — FENTANYL CITRATE 100 MCG: 50 INJECTION, SOLUTION INTRAMUSCULAR; INTRAVENOUS at 08:01

## 2019-06-04 RX ADMIN — MORPHINE SULFATE 2 MG: 2 INJECTION, SOLUTION INTRAMUSCULAR; INTRAVENOUS at 22:50

## 2019-06-04 RX ADMIN — MONTELUKAST SODIUM 10 MG: 10 TABLET, FILM COATED ORAL at 20:50

## 2019-06-04 RX ADMIN — FENTANYL CITRATE 25 MCG: 50 INJECTION INTRAMUSCULAR; INTRAVENOUS at 11:10

## 2019-06-04 RX ADMIN — Medication 80 MCG: at 08:26

## 2019-06-04 RX ADMIN — ATORVASTATIN CALCIUM 40 MG: 40 TABLET, FILM COATED ORAL at 20:50

## 2019-06-04 RX ADMIN — FENTANYL CITRATE 25 MCG: 50 INJECTION INTRAMUSCULAR; INTRAVENOUS at 11:15

## 2019-06-04 RX ADMIN — CYCLOBENZAPRINE HYDROCHLORIDE 10 MG: 10 TABLET, FILM COATED ORAL at 17:46

## 2019-06-04 RX ADMIN — OXYCODONE HYDROCHLORIDE 10 MG: 5 TABLET ORAL at 13:40

## 2019-06-04 RX ADMIN — ACETAMINOPHEN 650 MG: 325 TABLET ORAL at 07:48

## 2019-06-04 RX ADMIN — PROPOFOL 100 MG: 10 INJECTION, EMULSION INTRAVENOUS at 10:08

## 2019-06-04 RX ADMIN — PROPOFOL 150 MG: 10 INJECTION, EMULSION INTRAVENOUS at 08:01

## 2019-06-04 RX ADMIN — GABAPENTIN 300 MG: 300 CAPSULE ORAL at 20:49

## 2019-06-04 RX ADMIN — ACETAMINOPHEN 650 MG: 325 TABLET ORAL at 13:40

## 2019-06-04 RX ADMIN — FENTANYL CITRATE 25 MCG: 50 INJECTION INTRAMUSCULAR; INTRAVENOUS at 11:25

## 2019-06-04 RX ADMIN — HYDROMORPHONE HYDROCHLORIDE 0.2 MG: 1 INJECTION, SOLUTION INTRAMUSCULAR; INTRAVENOUS; SUBCUTANEOUS at 11:45

## 2019-06-04 RX ADMIN — PROPOFOL 50 MG: 10 INJECTION, EMULSION INTRAVENOUS at 08:02

## 2019-06-04 RX ADMIN — KETOROLAC TROMETHAMINE 30 MG: 30 INJECTION, SOLUTION INTRAMUSCULAR; INTRAVENOUS at 10:17

## 2019-06-04 RX ADMIN — MIDAZOLAM HYDROCHLORIDE 2 MG: 1 INJECTION, SOLUTION INTRAMUSCULAR; INTRAVENOUS at 07:56

## 2019-06-04 RX ADMIN — SODIUM CHLORIDE, SODIUM LACTATE, POTASSIUM CHLORIDE, AND CALCIUM CHLORIDE 25 ML/HR: 600; 310; 30; 20 INJECTION, SOLUTION INTRAVENOUS at 07:48

## 2019-06-04 RX ADMIN — HYDROMORPHONE HYDROCHLORIDE 0.2 MG: 1 INJECTION, SOLUTION INTRAMUSCULAR; INTRAVENOUS; SUBCUTANEOUS at 11:30

## 2019-06-04 RX ADMIN — GLYCOPYRROLATE 0.4 MG: 0.2 INJECTION INTRAMUSCULAR; INTRAVENOUS at 10:15

## 2019-06-04 RX ADMIN — Medication 80 MCG: at 08:23

## 2019-06-04 RX ADMIN — POTASSIUM CHLORIDE 10 MEQ: 750 TABLET, FILM COATED, EXTENDED RELEASE ORAL at 17:20

## 2019-06-04 RX ADMIN — ONDANSETRON 4 MG: 2 INJECTION INTRAMUSCULAR; INTRAVENOUS at 10:15

## 2019-06-04 RX ADMIN — ESMOLOL HYDROCHLORIDE 30 MG: 10 INJECTION INTRAVENOUS at 08:16

## 2019-06-04 RX ADMIN — OXYCODONE HYDROCHLORIDE 10 MG: 5 TABLET ORAL at 20:49

## 2019-06-04 RX ADMIN — Medication 2 G: at 15:01

## 2019-06-04 RX ADMIN — ACETAMINOPHEN 650 MG: 325 TABLET ORAL at 17:20

## 2019-06-04 RX ADMIN — Medication 2 G: at 08:15

## 2019-06-04 RX ADMIN — SODIUM CHLORIDE 125 ML/HR: 900 INJECTION, SOLUTION INTRAVENOUS at 11:07

## 2019-06-04 RX ADMIN — ESMOLOL HYDROCHLORIDE 20 MG: 10 INJECTION INTRAVENOUS at 10:16

## 2019-06-04 NOTE — ANESTHESIA POSTPROCEDURE EVALUATION
Post-Anesthesia Evaluation and Assessment    Patient: Jason Garner MRN: [id number]  SSN: [id number]    YOB: 1960  Age: 61 y.o. Sex: female       Cardiovascular Function/Vital Signs  Visit Vitals  /76   Pulse 95   Temp 37.2 °C (99 °F)   Resp 14   Ht 5' 4.5\" (1.638 m)   Wt 83 kg (183 lb)   SpO2 97%   BMI 30.93 kg/m²       Patient is status post General anesthesia for Procedure(s):  ANTERIOR CERVICAL DISCECTOMY AND  FUSION C3,4 WITH PEEK MEDTRONIC PLATE. Nausea/Vomiting: None    Postoperative hydration reviewed and adequate. Pain:  Pain Scale 1: Numeric (0 - 10) (06/04/19 1125)  Pain Intensity 1: 8 (06/04/19 1125)   Managed    Neurological Status:   Neuro (WDL): Within Defined Limits(denies numbness/tingling) (06/04/19 1116)  Neuro  Neurologic State: Alert; Appropriate for age (06/04/19 1116)  Orientation Level: Oriented X4 (06/04/19 1116)  Cognition: Follows commands (06/04/19 1116)  Speech: Appropriate for age;Clear (06/04/19 1116)  LUE Motor Response: Purposeful (06/04/19 1116)  LLE Motor Response: Purposeful (06/04/19 1116)  RUE Motor Response: Purposeful (06/04/19 1116)  RLE Motor Response: Purposeful (06/04/19 1116)   At baseline    Mental Status and Level of Consciousness: Alert and oriented to person, place, and time    Pulmonary Status:   O2 Device: Nasal cannula (06/04/19 1116)   Adequate oxygenation and airway patent    Complications related to anesthesia: None    Post-anesthesia assessment completed. No concerns    Signed By: Kris Dick MD     June 4, 2019              Procedure(s):  ANTERIOR CERVICAL DISCECTOMY AND  FUSION C3,4 WITH PEEK MEDTRONIC PLATE. general    <BSHSIANPOST>    Vitals Value Taken Time   /76 6/4/2019 11:15 AM   Temp 37.2 °C (99 °F) 6/4/2019 11:16 AM   Pulse 94 6/4/2019 11:27 AM   Resp 12 6/4/2019 11:27 AM   SpO2 97 % 6/4/2019 11:27 AM   Vitals shown include unvalidated device data.

## 2019-06-04 NOTE — OP NOTES
[address]   OPERATIVE REPORT    Name:  Serafin King  MR#:  [id number]  :  1960  ACCOUNT #:  [de-identified]  DATE OF SERVICE:  2019    PREOPERATIVE DIAGNOSIS:  Herniated nucleus pulposus C3-4 with cord compression. POSTOPERATIVE DIAGNOSIS:  Herniated nucleus pulposus C3-4 with cord compression. PROCEDURE PERFORMED:  Anterior cervical discectomy, decompression and fusion at C3-4. SURGEON:  Homa Barron MD    ASSISTANT:  Tenisha Phillips. ANESTHESIA:  General.    COMPLICATIONS:  None. SPECIMENS REMOVED:  None. IMPLANTS:  Please see brief operative note for detailed list of implants. ESTIMATED BLOOD LOSS:  10 mL. DRAINS:  Hemovac. FINDINGS:  Large herniated disk at C3-4 with significant canal compromise, anterior osteophyte. INDICATIONS FOR SURGERY:  This is a 60-year-old woman, who has had 2 previous cervical fusions. She had anterior cervical diskectomy and fusion without instrumentation approximately 22 years ago, this was from C4-C6. Eight years ago, she had a anterior cervical diskectomy and fusion with instrumentation at C6-7. She had done very well until recently when she started having paresthesias into her right upper extremity associated with some weakness and significant headache as well as some difficulty with balance. At baseline, she has myelopathy from her previous surgeries, but I did notice new weakness in her right upper extremity. We sent her for an MRI that showed a large disk herniation with cord compression at C3-4 just rostral her previous surgery. Risks and benefits were discussed. She understood these and agreed to proceed. OPERATIVE COURSE:  She was brought to the operating room. She was placed under general endotracheal anesthesia. She was given 2 g of Ancef within 1 hour prior to incision. She also had SCDs on and these were functioning throughout the case.   Preoperative x-ray was used to identify the C3-4 level which was marked and a transverse incision going from midline to the medial border of the sternocleidomastoid muscle was marked. This was directly where her previous incision had been I assume for the C4-6 surgery. She was sterilely prepped and draped in standard fashion. I used a 10 blade to incise the skin. Dissection was carried down to the platysma that  I bipolared, bissected, and then undermined rostrally and caudally for better exposure. I did encounter some scar along the way, but not significant scarring. I used blunt dissection to continue down to the anterior cervical spine going medial to the sternocleidomastoid muscle, medial to the carotid. I had good visualization of the anterior cervical spine. There was a large osteophyte over the C3-4 level. I slightly bipolared and elevated the attachments of the longus colli at this level. Self-retaining retractors were placed. I placed a disk space maker with the use of intraoperative x-ray to verify that this was indeed the correct space. I then used a 3 mm matchstick drill to drill down the anterior osteophyte. I used a Bovie to remove some scar that was along the anterior cervical spine. I used a small rongeur to remove additional anterior osteophytes. I used an 11 blade to incise the disk space. I removed disk material with a series of curettes and pituitaries. I placed 14 mm distraction pins into the body of C3 and C4 and placed gentle distraction across the disk space and continued with removal of disk material down to the posterior osteophyte. I then brought in the operating microscope. Under microscopic vision, I continued decompression using 1-mm Kerrison to remove posterior osteophytes. There was significant amount of disk material posterior to the vertebral bodies both rostrally and caudally that I was able to remove with a nerve hook. There was disk behind the posterior longitudinal ligament.   I removed the posterior longitudinal ligament with 1-mm Kerrison. There were some parts that were scarred, but I was able to elevate these and remove these. I had good visualization of the thecal sac when I finished. There was good decompression of the thecal sac centrally. I then turned my attention bilaterally to foraminotomies. There was some bleeding in the left foramen that was easily stopped with FloSeal and a joanne. On the right foramen, I used a 2 mm Kerrison and there was good decompression when I finished. I used curette to remove cartilaginous endplates to provide good fusion surfaces. I tried a 6 and 7 mm trial.  I felt that the 7 mm trial had the best fit. I felt that it did not over-distract the disk space, but had good contact along both fusion surfaces, therefore, a 7 mm PEEK interbody device with titanium on the endplates and Cesilia (nonstructural stem cell allograft) was placed with good position, verified with intraoperative x-ray. A 19 mm plate was placed with 15 mm variable screws. All screws had good purchase. Position was verified with intraoperative x-ray. Locking mechanisms were tightened. The wound was copiously irrigated with antibiotic irrigation. A Hemovac drain was placed and the wound was closed using 2-0 interrupted Vicryls for the platysma, 2-0 interrupted Vicryls for the subcutaneous tissue, and a 4-0 running subcuticular stitch for the skin. She tolerated the surgery well and was taken to the postoperative care unit.         MD FAYN Bess/HORTENCIA_PATRICIO_I/HORTENCIA_CHAD_P  D:  06/04/2019 10:45  T:  06/04/2019 17:32  JOB #:  [id number]

## 2019-06-04 NOTE — BRIEF OP NOTE
BRIEF OPERATIVE NOTE    Date of Procedure: 6/4/2019   Preoperative Diagnosis: HNP C3,4 WITH CORD COMPRESSION  Postoperative Diagnosis: HNP C3,4 WITH CORD COMPRESSION    Procedure(s):  ANTERIOR CERVICAL DISCECTOMY AND  FUSION C3,4 WITH PEEK MEDTRONIC PLATE  Surgeon(s) and Role:     * Brigitte Singh MD - Primary         Surgical Assistant: Benjamín Burger    Surgical Staff:  Circ-1: Heath Munoz RN  Circ-Relief: Chad Walton RN  Radiology Technician: Christopher Lombardi RT  Scrub RN-1: Jomar Franz RN  Surg Asst-1: Love Jenkins  Surg Asst-2: Maame Levin  Event Time In Time Out   Incision Start 0840    Incision Close       Anesthesia: General   Estimated Blood Loss: 10cc  Specimens: * No specimens in log *   Findings: large HNP C3/4 with significant canal compromise, anterior osteophyte  Complications: none  Implants:   Implant Name Type Inv.  Item Serial No.  Lot No. LRB No. Used Action   GRAFT BNE ELITE JUAN DANIEL  --  - [id number] Graft GRAFT [employer]  --  [id number] MUSCULOSKELETAL TRANS 3510 Right 1 Implanted   ANATOMIC PEEK CERVICAL FUSION SPACER   NA  61GH Right 1 Implanted   PLATE ANTR CERV 61FD 1 LVL -- ZEVO - SNA Plate PLATE ANTR CERV 81SX 1 LVL -- ZEVO NA MEDTRONIC SOFAMOR DANEK NA Right 1 Implanted   SCR ANTR CERV MAISHA SD 3.5X15MM -- ZEVO - SNA Screw SCR ANTR CERV MAISHA SD 3.5X15MM -- ZEVO NA MEDTRONIC SOFAMOR DANEK NA Right 4 Implanted

## 2019-06-04 NOTE — PERIOP NOTES
Patient: Leilani Peter MRN: [id number]  SSN: [id number]   YOB: 1960  Age: 61 y.o. Sex: female     Patient is status post Procedure(s):  ANTERIOR CERVICAL DISCECTOMY AND  FUSION C3,4 WITH PEEK MEDTRONIC PLATE. Surgeon(s) and Role:     Que Xiao MD - Primary    Local/Dose/Irrigation:                    Peripheral IV 06/04/19 Left; Inner Arm (Active)          Hemovac Right; Anterior Neck (Active)      Airway - Endotracheal Tube 06/04/19 Oral (Active)                   Dressing/Packing:  Wound Neck Right-Dressing Type: Adhesive wound closure strips (Steri-Strips); Adhesive wound dressing (Mastisol); Other (Comment)(Mastisol, steri strips and coverlet) (06/04/19 0900)    Splint/Cast: Wound Neck Right-Splint Type/Material: Cervical Collar]    Other:

## 2019-06-04 NOTE — PERIOP NOTES
TRANSFER - OUT REPORT:    Verbal report given to TOM MARTEL(name) on Juliano Fermin  being transferred to [facility](unit) for routine post - op       Report consisted of patients Situation, Background, Assessment and   Recommendations(SBAR). Time Pre op antibiotic given:Y  Anesthesia Stop time: Y  Rader Present on Transfer to floor:N  Order for Rader on Chart:N  Discharge Prescriptions with Chart:N    Information from the following report(s) SBAR was reviewed with the receiving nurse. Opportunity for questions and clarification was provided. Is the patient on 02? YES       L/Min 2       Other LITERS    Is the patient on a monitor? NO    Is the nurse transporting with the patient? NO    Surgical Waiting Area notified of patient's transfer from PACU?  YES      The following personal items collected during your admission accompanied patient upon transfer:   Dental Appliance: Dental Appliances: None  Vision:    Hearing Aid:    Jewelry:    Clothing:    Other Valuables:    Valuables sent to safe:

## 2019-06-04 NOTE — PROGRESS NOTES
Primary Nurse Chilango Mantilla and Lord Boo, TAHMINA performed a dual skin assessment on this patient No impairment noted  Navdeep score is 21

## 2019-06-05 VITALS
OXYGEN SATURATION: 94 % | BODY MASS INDEX: 30.49 KG/M2 | HEART RATE: 79 BPM | SYSTOLIC BLOOD PRESSURE: 119 MMHG | HEIGHT: 65 IN | TEMPERATURE: 99.3 F | WEIGHT: 183 LBS | DIASTOLIC BLOOD PRESSURE: 69 MMHG | RESPIRATION RATE: 16 BRPM

## 2019-06-05 PROCEDURE — 74011250636 HC RX REV CODE- 250/636: Performed by: NEUROLOGICAL SURGERY

## 2019-06-05 PROCEDURE — 74011250637 HC RX REV CODE- 250/637: Performed by: NURSE PRACTITIONER

## 2019-06-05 PROCEDURE — 74011250637 HC RX REV CODE- 250/637: Performed by: NEUROLOGICAL SURGERY

## 2019-06-05 PROCEDURE — 99218 HC RM OBSERVATION: CPT

## 2019-06-05 RX ORDER — OXYCODONE HYDROCHLORIDE 5 MG/1
5-10 TABLET ORAL
Qty: 40 TAB | Refills: 0 | Status: SHIPPED | OUTPATIENT
Start: 2019-06-05 | End: 2019-06-10

## 2019-06-05 RX ORDER — POLYETHYLENE GLYCOL 3350 17 G/17G
17 POWDER, FOR SOLUTION ORAL DAILY
Status: DISCONTINUED | OUTPATIENT
Start: 2019-06-05 | End: 2019-06-05 | Stop reason: HOSPADM

## 2019-06-05 RX ORDER — CYCLOBENZAPRINE HCL 10 MG
10 TABLET ORAL
Qty: 21 TAB | Refills: 0 | Status: SHIPPED | OUTPATIENT
Start: 2019-06-05

## 2019-06-05 RX ADMIN — OXYCODONE HYDROCHLORIDE 10 MG: 5 TABLET ORAL at 00:31

## 2019-06-05 RX ADMIN — Medication 2 G: at 00:31

## 2019-06-05 RX ADMIN — ACETAMINOPHEN 650 MG: 325 TABLET ORAL at 11:53

## 2019-06-05 RX ADMIN — VENLAFAXINE HYDROCHLORIDE 150 MG: 150 CAPSULE, EXTENDED RELEASE ORAL at 09:17

## 2019-06-05 RX ADMIN — CYCLOBENZAPRINE HYDROCHLORIDE 10 MG: 10 TABLET, FILM COATED ORAL at 07:18

## 2019-06-05 RX ADMIN — OXYCODONE HYDROCHLORIDE 10 MG: 5 TABLET ORAL at 07:12

## 2019-06-05 RX ADMIN — POTASSIUM CHLORIDE 10 MEQ: 750 TABLET, FILM COATED, EXTENDED RELEASE ORAL at 09:18

## 2019-06-05 RX ADMIN — MORPHINE SULFATE 2 MG: 2 INJECTION, SOLUTION INTRAMUSCULAR; INTRAVENOUS at 02:49

## 2019-06-05 RX ADMIN — POLYETHYLENE GLYCOL 3350 17 G: 17 POWDER, FOR SOLUTION ORAL at 10:28

## 2019-06-05 RX ADMIN — ACETAMINOPHEN 650 MG: 325 TABLET ORAL at 00:31

## 2019-06-05 RX ADMIN — GABAPENTIN 300 MG: 300 CAPSULE ORAL at 09:17

## 2019-06-05 RX ADMIN — OXYCODONE HYDROCHLORIDE 10 MG: 5 TABLET ORAL at 11:53

## 2019-06-05 RX ADMIN — OXYCODONE HYDROCHLORIDE 10 MG: 5 TABLET ORAL at 14:47

## 2019-06-05 RX ADMIN — SODIUM CHLORIDE 125 ML/HR: 900 INJECTION, SOLUTION INTRAVENOUS at 00:31

## 2019-06-05 RX ADMIN — LEVOTHYROXINE SODIUM 100 MCG: 100 TABLET ORAL at 07:12

## 2019-06-05 RX ADMIN — ESTRADIOL 0.5 MG: 1 TABLET ORAL at 09:17

## 2019-06-05 RX ADMIN — ACETAMINOPHEN 650 MG: 325 TABLET ORAL at 07:12

## 2019-06-05 RX ADMIN — PANTOPRAZOLE SODIUM 40 MG: 40 TABLET, DELAYED RELEASE ORAL at 07:12

## 2019-06-05 NOTE — PROGRESS NOTES
Care Management Interventions  PCP Verified by CM: Yes  Palliative Care Criteria Met (RRAT>21 & CHF Dx)?: No  Mode of Transport at Discharge: Other (see comment)  MyChart Signup: No  Discharge Durable Medical Equipment: No  Health Maintenance Reviewed: Yes  Physical Therapy Consult: No  Occupational Therapy Consult: No  Speech Therapy Consult: No  Current Support Network: Own Home, Family Lives Nearby  Confirm Follow Up Transport: Family  Plan discussed with Pt/Family/Caregiver: Yes  [place] Resource Information Provided?: No  Discharge Location  Discharge Placement: Home with family assistance      There are currently no cm orders at this time. CM met with patient at bedside to provide observation letter. Observation notice provided in writing to patient and/or caregiver as well as verbal explanation of the policy. Patients who are in outpatient status also receive the Observation notice. CM to follow for any other discharge needs.     Hira Romero [facility]

## 2019-06-05 NOTE — ROUTINE PROCESS
I have reviewed discharge instructions with the patient. The patient verbalized understanding. IV discontinued. Catheter tip intact.

## 2019-06-05 NOTE — PROGRESS NOTES
Neurosurgery Spine Progress Note  Yue Atkinson, AGACNP-BC  Work Cell: [phone]    Post Op Day: 1 Day Post-Op    June 5, 2019 8:52 AM     Joselin Burrows    HPI:      Joselin Burrows is a 61 y.o. female with history of lumbar stenosis with neurogenic claudication, GERD, HLD, hypertension, RC, and hypothyroidism. She is known to Dr. Lali Delaney from a prior lumbar decompression and fusion last fall. She has history of a C5-6 ACDF with Dr. Braden Wall in 2008. She has some baseline myelopathy from this previous surgery. Earlier this year she developed significant cervicalgia and right sided headaches. She also had pain extending down her right arm and has been dropping things. She had weakness in her RUE clinically. An MRI was obtained showing a large disk herniation with cord compression at C3-4. After a discussion of the risks, benefits, and alternatives, Joselin Burrows consented to undergo a  Procedure(s):  ANTERIOR CERVICAL DISCECTOMY AND  FUSION C3,4 WITH PEEK MEDTRONIC PLATE    Subjective      Patient sitting up at edge of bed. Cervical collar in place. No arm pain. Feels a little stronger in her right arm. Throat sore but no difficulty swallowing. No vocal hoarseness. She had a headache overnight but this is resolved. Patient denies headache, dizziness, chest pain, sob, abdominal pain, fever, chills, or nausea/vomiting. Objective:     Physical Exam:  General:  Alert and oriented. No acute distress. Respiratory:  Breathing unlabored. Equal chest expansion   Abdomen:   CV: Soft, non-tender, non-distended   No edema appreciated in the extremities   Neurologic:      Musculoskeletal:  Speech fluent. No facial droop. Follows commands. BOWER/SILT Motor: deltoids/biceps/triceps/brachioradialis 5/5 bilaterally.  strength 4+/5 on right, 5/5 on left. Gait deferred. Calves soft, nontender upon palpation and with passive stretch. Moves both upper and lower extremities. Incision: clean, dry, and intact. No significant erythema or swelling. No active drainage noted. Vital Signs:    Patient Vitals for the past 8 hrs:   BP Temp Pulse Resp SpO2   19 0223 103/60 97.5 °F (36.4 °C) 72 18 95 %     Temp (24hrs), Av.2 °F (36.8 °C), Min:97.5 °F (36.4 °C), Max:99 °F (37.2 °C)      Intake/Output:  No intake/output data recorded.  1901 -  0700  In: 1050 [I.V.:1050]  Out: 1730 [Urine:1700]    Pain Control:   Pain Assessment  Pain Scale 1: Visual  Pain Intensity 1: 0  Pain Onset 1: postop  Pain Location 1: Neck, Head  Pain Orientation 1: Anterior, Right  Pain Description 1: Aching, Radiating  Pain Intervention(s) 1: Medication (see MAR)    LAB:    No results for input(s): HCT, HGB, HCTEXT, HGBEXT in the last 72 hours. Lab Results   Component Value Date/Time    Sodium 139 2019 04:13 PM    Potassium 3.0 (L) 2019 04:13 PM    Chloride 101 2019 04:13 PM    CO2 30 2019 04:13 PM    Glucose 94 2019 04:13 PM    BUN 11 2019 04:13 PM    Creatinine 0.89 2019 04:13 PM    Calcium 8.8 2019 04:13 PM       PT/OT:   Gait:                      Assessment/Plan      1. 1 Day Post-Op Procedure(s):  ANTERIOR CERVICAL DISCECTOMY AND  FUSION C3,4 WITH PEEK MEDTRONIC PLATE   -up ad bony   -continue cervical collar   -Pain control- prn oxycodone, prn flexeril   -d/c hemovac (no output overnight)   -Voiding   -Incentive Spirometer   -Tolerating diet   -VTE Prophylaxes - TEDS & SCDs    2. Hypothyroidism   -synthroid    3. GERD, chronic   -stable on PPI    4. HLD   -c/w statin    5. Hypertension   -BP borderline low- hold home bp meds      Plan above discussed with Dr. Roberts Prim    Discharge To:   Home likely later am    Signed By: Lili Garcia NP

## 2019-06-05 NOTE — DISCHARGE INSTRUCTIONS
After Hospital Care Plan:  Discharge Instructions Cervical (Neck) Spine Surgery Dr. Mely Faust    Patient Name: Silver Cornell    Date of procedure: 6/4/2019      Date of discharge: 6/5/2019    Procedure: Procedure(s):  ANTERIOR CERVICAL DISCECTOMY AND  FUSION C3,4 WITH PEEK MEDTRONIC PLATE      PCP: Adrian Franz MD      Follow up appointments  -follow up with Dr. Mely Faust in 2 weeks. [phone] to make an appointment as soon as you get home from the hospital.    When to call your Spine Surgeon:  -Difficulty swallowing that is worse than when you left the hospital.  -Signs of infection-if your incision is red; continues to have drainage; drainage has a foul odor or if you have a persistent fever over 101 degrees for 24 hours  -nausea or vomiting, severe headache  -changes in sensation in your arms or legs (numbness, tingling, loss of color)  -increased weakness-greater than before your surgery  -severe pain or pain not relieved by medications  -Signs of a blood clot in your leg-calf pain, tenderness, redness, swelling of lower leg    When to call your Primary Care Physician:  -Concerns about medical conditions such as diabetes, high blood pressure, asthma, congestive heart failure  -Call if blood sugars are elevated, persistent headache or dizziness, coughing or congestion, constipation or diarrhea, burning with urination, abnormal heart rate    When to call 911 and go to the nearest emergency room:  -acute onset of chest pain, shortness of breath, difficulty breathing    Activity  - You are going home a well person, be as active as possible. Your only exercise should be walking. Start with short frequent walks and increase your walking distance each day.  -Limit the amount of time you sit to 20-30 minute intervals. Sitting for prolonged periods of time will be uncomfortable for you following surgery.   - Do NOT lift anything over 5 pounds  -From now on, even when lifting light weight, bend with your knees and not your back.  -Do NOT do any neck exercises until you have been instructed by your doctor  -When you are in bed, you may lay on your back or on either side. Do NOT lie on your stomach    Cervical Collar (Aspen Collar)  -You are required to wear your cervical collar at all times; except when showering. You may remove the collar long enough to change the pads when needed and to change your dressing each day. -Do not bend or twist when your collar is off. It is best to have someone assist you when changing the pads and your dressing to prevent you from bending your neck. - Clean the pads on your neck collar every day by hand washing with a mild soap and water. Pat them dry with a towel and lay out to air dry. Do not use heat to dry the pads. Diet  -resume usual diet; drink plenty of fluids; eat foods high in fiber  -It is important to have regular bowel movements. Pain medications may cause constipation. You may want to take a stool softener (such as Senokot-S or Colace) to prevent constipation.  -If constipation occurs, take a laxative (such as Dulcolax tablets, Milk of Magnesia, or a suppository). Laxatives should only be used if the above preventable measures have failed and you still have not had a bowel movement after three days    Driving  -You may not drive or return to work until instructed by your physician. However, you may ride in the car for short periods of time. Incision Care  -You may take brief showers but do not run the water run directly onto the wound.  After showering or bathing gently blot the wound dry with a soft towel.  -Do not rub or apply any lotions or ointments to your incision site.   -Do not soak or scrub your wound; do not swim until the adhesive film has fallen off naturally  -Do not scratch, rub, or pick at the wound or skin glue, doing so may loosen the film before the wound is fully healed  -Stay out of direct sunlight and do not use tanning lamps Showering  -You may shower in approximately 4 days after your surgery.    -Reminder- Make sure you put clean pads on your collar after your shower.    -Do not take a tub bath. Preventing blood clots  -You have been given T.E.D. stockings to wear. Continue to wear these for 7 days after your discharge. Put them on in the morning and take them off at night.    -They are used to increase your circulation and prevent blood clots from forming in your legs  -T. E.D. stockings can be machine washed, temperature not to exceed 160° F (71°C) and machine dried for 15 to 20 minutes, temperature not to exceed 250° F (121°C). Pain management  -Take pain medication as prescribed; decrease the amount you use as your pain lessens.  -Do not wait until you are in extreme pain to take your medication.  -Avoid alcoholic beverages while taking pain medication. Pain Medication Safety  DO:  -Read the Medication Guide   -Take your medicine exactly as prescribed   -Store your medicine away from children and in a safe place   -Flush unused medicine down the toilet   -Call your healthcare provider for medical advice about side effects. You may report side effects to FDA at 3-137-FDA-7207.   -Please be aware that many medications contain Tylenol. We do not want you to over medicate so please read the information below as a guide. Do not take more than 4 Grams of Tylenol in a 24 hour period.   (There are 1000 milligrams in one Gram)                                                                                                                                                                                                                                                                                                                                                                  Percocet contains 325 mg of Tylenol per tablet (do not take more than 12 tablets in 24 hours)  Lortab contains 500 mg of Tylenol per tablet (do not take more than 8 tablets in 24 hours)  Norco contains 325 mg of Tylenol per tablet (do not take more than 12 tablets in 24 hours). DO NOT:  -Do not give your medicine to others   -Do not take medicine unless it was prescribed for you   -Do not stop taking your medicine without talking to your healthcare provider   -Do not break, chew, crush, dissolve, or inject your medicine. If you cannot  swallow your medicine whole, talk to your healthcare provider.  -Do not drink alcohol while taking this medicine  -Do not take anti-inflammatory medications or aspirin unless instructed by your   physician.

## 2019-06-06 NOTE — DISCHARGE SUMMARY
[address] [place] [address]  [phone]     Discharge Summary       PATIENT ID: Paula Villegas  MRN: [id number]   YOB: 1960    DATE OF ADMISSION: 6/4/2019  6:07 AM    DATE OF DISCHARGE: 6/5/2019   PRIMARY CARE PROVIDER: Mirian Rutherford MD     CONSULTATIONS: None    PROCEDURES/SURGERIES: Procedure(s):  ANTERIOR CERVICAL DISCECTOMY AND  FUSION C3,4 WITH PEEK MEDTRONIC PLATE    History of Present Illness:  Paula Villegas is a 61 y.o. female with history of lumbar stenosis with neurogenic claudication, GERD, HLD, hypertension, RC, and hypothyroidism. She is known to Dr. Osiris Tamayo from a prior lumbar decompression and fusion last fall. She has history of a C5-6 ACDF with Dr. Ivanna Abrams in 2008. She has some baseline myelopathy from this previous surgery. Earlier this year she developed significant cervicalgia and right sided headaches. She also had pain extending down her right arm and has been dropping things. She had weakness in her RUE clinically. An MRI was obtained showing a large disk herniation with cord compression at C3-4. Tanisha Dominguez After failing conservative therapy and a discussion of the risks, benefits, alternatives, perioperative course, and potential complications of surgery, she consented to undergo a Procedure(s):  ANTERIOR CERVICAL DISCECTOMY AND  FUSION C3,4 WITH PEEK MEDTRONIC PLATE. Hospital Course:  Paula Villegas tolerated the procedure well. She was transferred  to the recovery room in stable condition. After a brief stay the patient was then transferred to the Spinal Surgery Unit at [address] [place] [address].  On postoperative day #1, the dressing was clean and dry, she was neurovascularly intact. The patient was afebrile and vital signs were stable. Calves were soft and non-tender bilaterally. The patient was tolerating a regular diet without dysphagia and mobilizing without difficulty. Hemoglobin prior to discharge were   Lab Results   Component Value Date/Time    HGB 13.3 05/28/2019 04:13 PM        Parish Bardales was discharged to Home in stable condition on postoperative day 1. She was provided with routine postoperative instructions and advised to follow up with Vania Kahn MD  in 2 weeks following discharge from the hospital.        FOLLOW UP APPOINTMENTS:    Follow-up Information     Follow up With Specialties Details Why Contact Info    Jamal Marroquin MD Internal Medicine   [address] [facility] [address]  [facility]  [phone]               DISCHARGE MEDICATIONS:  Discharge Medication List as of 6/5/2019  1:48 PM      START taking these medications    Details   oxyCODONE IR (ROXICODONE) 5 mg immediate release tablet Take 1-2 Tabs by mouth every four (4) hours as needed for Pain for up to 5 days. Max Daily Amount: 60 mg., Print, Disp-40 Tab, R-0         CONTINUE these medications which have CHANGED    Details   cyclobenzaprine (FLEXERIL) 10 mg tablet Take 1 Tab by mouth three (3) times daily as needed for Muscle Spasm(s). Indications: muscle spasm, Print, Disp-21 Tab, R-0         CONTINUE these medications which have NOT CHANGED    Details   venlafaxine-SR (EFFEXOR-XR) 150 mg capsule Take 150 mg by mouth daily. , Historical Med      albuterol (PROVENTIL HFA, VENTOLIN HFA, PROAIR HFA) 90 mcg/actuation inhaler Take 1 Puff by inhalation every four (4) hours as needed for Wheezing., Historical Med      potassium chloride (KLOR-CON) 10 mEq tablet Take 10 mEq by mouth two (2) times a day., Historical Med      zafirlukast (ACCOLATE) 20 mg tablet Take 20 mg by mouth two (2) times a day., Historical Med      losartan-hydroCHLOROthiazide (HYZAAR) 100-25 mg per tablet Take 1 Tab by mouth daily. , Historical Med      !! levothyroxine (SYNTHROID) 50 mcg tablet Take 50 mcg by mouth Daily (before breakfast).  ON Sunday ONLY 1/2 TAB=50 MCG  Indications: sunday only, Historical Med zolpidem CR (AMBIEN CR) 12.5 mg tablet Take 12.5 mg by mouth nightly., Historical Med      Cetirizine (ZYRTEC) 10 mg Cap Take 10 mg by mouth daily. , Historical Med      omeprazole (PRILOSEC) 20 mg capsule Take 20 mg by mouth daily. , Historical Med      estradiol (ESTRACE) 0.5 mg tablet Take 0.5 mg by mouth daily. , Historical Med      atorvastatin (LIPITOR) 40 mg tablet Take 40 mg by mouth nightly., Historical Med      gabapentin (NEURONTIN) 300 mg capsule Take 300 mg by mouth two (2) times a day., Historical Med      !! levothyroxine (SYNTHROID) 100 mcg tablet Take 100 mcg by mouth Daily (before breakfast). Indications: mon through sat, Historical Med       !! - Potential duplicate medications found. Please discuss with provider. PHYSICAL EXAMINATION AT DISCHARGE:  General: Pleasant, alert, cooperative, no distress. Resp: Equal chest expansion. No accessory muscle use. CV:  No edema appreciated in the extremities. Neurological: Follows commands. BOWER. Speech clear. Sensation intact to light touch. Motor: unchanged C5-T1   Skin:  Incision - clean, dry and intact. No significant erythema or swelling.       CHRONIC MEDICAL DIAGNOSES:  Problem List as of 6/5/2019 Date Reviewed: 6/4/2019          Codes Class Noted - Resolved    Herniated cervical disc ICD-10-CM: M50.20  ICD-9-CM: 722.0  6/4/2019 - Present        Back pain ICD-10-CM: M54.9  ICD-9-CM: 724.5  10/14/2018 - Present        Lumbar stenosis with neurogenic claudication ICD-10-CM: M48.062  ICD-9-CM: 724.03  9/18/2018 - Present        Unspecified essential hypertension ICD-10-CM: I10  ICD-9-CM: 401.9  3/28/2014 - Present        Cervical disc disorder with radiculopathy of cervical region ICD-10-CM: M50.10  ICD-9-CM: 723.4  1/31/2012 - Present              Signed:   Ramya Carrera NP  6/6/2019  2:18 PM

## 2019-09-13 ENCOUNTER — HOSPITAL ENCOUNTER (OUTPATIENT)
Dept: MRI IMAGING | Age: 59
Discharge: HOME | End: 2019-09-13
Attending: NEUROLOGICAL SURGERY
Payer: COMMERCIAL

## 2019-09-13 DIAGNOSIS — G95.9 MYELOPATHY (HCC): ICD-10-CM

## 2019-09-13 PROCEDURE — 72146 MRI CHEST SPINE W/O DYE: CPT

## 2019-12-19 ENCOUNTER — HOSPITAL ENCOUNTER (OUTPATIENT)
Dept: MRI IMAGING | Age: 59
Discharge: HOME | End: 2019-12-19
Attending: NEUROLOGICAL SURGERY
Payer: COMMERCIAL

## 2019-12-19 ENCOUNTER — HOSPITAL ENCOUNTER (OUTPATIENT)
Dept: MAMMOGRAPHY | Age: 59
Discharge: HOME | End: 2019-12-19
Attending: INTERNAL MEDICINE
Payer: COMMERCIAL

## 2019-12-19 DIAGNOSIS — G95.9 MYELOPATHY (HCC): ICD-10-CM

## 2019-12-19 DIAGNOSIS — Z12.31 VISIT FOR SCREENING MAMMOGRAM: ICD-10-CM

## 2019-12-19 PROCEDURE — 72141 MRI NECK SPINE W/O DYE: CPT

## 2019-12-19 PROCEDURE — 77063 BREAST TOMOSYNTHESIS BI: CPT

## 2022-03-18 PROBLEM — M54.9 BACK PAIN: Status: ACTIVE | Noted: 2018-10-14

## 2022-03-19 PROBLEM — M50.20 HERNIATED CERVICAL DISC: Status: ACTIVE | Noted: 2019-06-04

## 2022-03-19 PROBLEM — M48.062 LUMBAR STENOSIS WITH NEUROGENIC CLAUDICATION: Status: ACTIVE | Noted: 2018-09-18

## 2023-02-06 ENCOUNTER — OFFICE VISIT (OUTPATIENT)
Dept: CARDIOLOGY CLINIC | Age: 63
End: 2023-02-06
Payer: COMMERCIAL

## 2023-02-06 VITALS
OXYGEN SATURATION: 98 % | HEART RATE: 86 BPM | BODY MASS INDEX: 26.99 KG/M2 | SYSTOLIC BLOOD PRESSURE: 120 MMHG | WEIGHT: 162 LBS | DIASTOLIC BLOOD PRESSURE: 70 MMHG | HEIGHT: 65 IN

## 2023-02-06 DIAGNOSIS — R00.2 PALPITATIONS: Primary | ICD-10-CM

## 2023-02-06 DIAGNOSIS — R07.89 CHEST DISCOMFORT: ICD-10-CM

## 2023-02-06 PROCEDURE — 3078F DIAST BP <80 MM HG: CPT | Performed by: SPECIALIST

## 2023-02-06 PROCEDURE — 93000 ELECTROCARDIOGRAM COMPLETE: CPT | Performed by: SPECIALIST

## 2023-02-06 PROCEDURE — 99204 OFFICE O/P NEW MOD 45 MIN: CPT | Performed by: SPECIALIST

## 2023-02-06 PROCEDURE — 3074F SYST BP LT 130 MM HG: CPT | Performed by: SPECIALIST

## 2023-02-06 RX ORDER — TRAMADOL HYDROCHLORIDE 300 MG/1
TABLET, FILM COATED, EXTENDED RELEASE ORAL
COMMUNITY

## 2023-02-06 RX ORDER — NAPROXEN 500 MG/1
500 TABLET ORAL 2 TIMES DAILY WITH MEALS
COMMUNITY

## 2023-02-06 NOTE — PROGRESS NOTES
Johnathan Tran is a 61 y.o. female    Vitals:    02/06/23 1623   BP: 120/70   BP 1 Location: Left upper arm   BP Patient Position: Sitting   BP Cuff Size: Adult   Pulse: 86   Height: 5' 4.5\" (1.638 m)   Weight: 162 lb (73.5 kg)   SpO2: 98%       Chief Complaint   Patient presents with    Palpitations       Chest pain PALPITATIONS  SOB NO  Dizziness YES  Swelling NO  Recent hospital visit NO  Refills NO  COVID VACCINE YES  HAD COVID?  YES

## 2023-02-06 NOTE — PROGRESS NOTES
CARDIOLOGY OFFICE NOTE    Jh Paz MD, [address], [place], [address] [facility]  Phone [phone]; Fax [phone]  Mobile [phone]   Voice Mail [phone]    Primary care: Chino Nuñez MD       ATTENTION:   This medical record was transcribed using an electronic medical records/speech recognition system. Although proofread, it may and can contain electronic, spelling and other errors. Corrections may be executed at a later time. Please feel free to contact us for any clarifications as needed. Ace Minor is a 61 y.o. female with  referred for palpitations          Cardiac risk factors: family history, dyslipidemia, diabetes mellitus, hypertension, post-menopausal  I have personally obtained the history from the patient. HISTORY OF PRESENTING ILLNESS    Ms./Mr. Ace Minor  61 y.o. is  being evaluated for palpitations and SOB. She worked for [employer] in the past. She had COVID in past and now is experiencing palpitations. She had COVID in May and then had sx's of palpations. She had some SOB and dizziness. SOB with activity. She will use extra pillows to raise head. She notes the longest palpations 2 hours and sudden onset and suddenly disappears.         ACTIVE PROBLEM LIST     Patient Active Problem List    Diagnosis Date Noted    Herniated cervical disc 06/04/2019    Back pain 10/14/2018    Lumbar stenosis with neurogenic claudication 09/18/2018    Unspecified essential hypertension 03/28/2014    Cervical disc disorder with radiculopathy of cervical region 01/31/2012           PAST MEDICAL HISTORY     Past Medical History:   Diagnosis Date    Arthritis     DDD    Asthma     RESCUE INHALER WHEN NEEDED    Chronic pain     CHRONIC BACK AND NECK PAIN    GERD (gastroesophageal reflux disease)     Hyperlipemia     Hypertension     CONTROLLED WITH MEDS    Nausea & vomiting     Osteopenia     Other ill-defined conditions(473.24) HX: PYELONEPHRITIS AND STONES    PUD (peptic ulcer disease)     Sleep apnea     USES CPAP    Thyroid disease     HYPOTHYROID           PAST SURGICAL HISTORY     Past Surgical History:   Procedure Laterality Date    HX BREAST BIOPSY Right 2003    Stereotactic    HX BREAST BIOPSY Right 2005    Surgical excision x2    HX BREAST REDUCTION Bilateral 2004    HX GI      COLONOSCOPY/ EGD    HX GI  1980s    choleysystectomy    HX GYN  2001    hysterectomy    HX HEENT      WISDOM TEETH    HX OPEN CHOLECYSTECTOMY      HX ORTHOPAEDIC      C4, C5, L4, L5, S1 FUSIONS    DC UNLISTED NEUROLOGICAL/NEUROMUSCULAR DX PX      C4, C5, L4, L5, S1 FUSIONS    DC UNLISTED NEUROLOGICAL/NEUROMUSCULAR DX PX  09/2018    C4-C7, L3-S1    DC UNLISTED PROCEDURE BREAST  1980    BENIGN MASS RIGHT BREAST          ALLERGIES     Allergies   Allergen Reactions    Levaquin [Levofloxacin] Hives and Itching    Lidocaine Hives     LIDOCAINE INJECTED FOR SUTURE INSERTION    Promethazine Hives          FAMILY HISTORY     Family History   Problem Relation Age of Onset    Diabetes Mother         TYPE II    Hypertension Mother     Breast Cancer Mother 78    Arthritis Mother     Hypertension Father     Coronary Art Dis Father     Hypertension Brother     Diabetes Maternal Grandmother     Hypertension Maternal Grandmother     Hypertension Maternal Grandfather     Cancer Maternal Grandfather         COLON CA    Hypertension Paternal Grandmother     Hypertension Paternal Grandfather     Hypertension Brother     Hypertension Brother     Breast Cancer Other         maternal cousins    negative for cardiac disease       SOCIAL HISTORY     Social History     Socioeconomic History    Marital status: SINGLE   Tobacco Use    Smoking status: Never    Smokeless tobacco: Never   Substance and Sexual Activity    Alcohol use: Yes     Comment: RARE    Drug use: No         MEDICATIONS     Current Outpatient Medications   Medication Sig    traMADoL 300 mg TM24 Take  by mouth. naproxen (NAPROSYN) 500 mg tablet Take 500 mg by mouth two (2) times daily (with meals). venlafaxine-SR (EFFEXOR-XR) 150 mg capsule Take 150 mg by mouth daily. albuterol (PROVENTIL HFA, VENTOLIN HFA, PROAIR HFA) 90 mcg/actuation inhaler Take 1 Puff by inhalation every four (4) hours as needed for Wheezing. potassium chloride (KLOR-CON) 10 mEq tablet Take 10 mEq by mouth two (2) times a day. zafirlukast (ACCOLATE) 20 mg tablet Take 20 mg by mouth two (2) times a day. losartan-hydroCHLOROthiazide (HYZAAR) 100-25 mg per tablet Take 1 Tab by mouth daily. levothyroxine (SYNTHROID) 50 mcg tablet Take 50 mcg by mouth Daily (before breakfast). ON Sunday ONLY 1/2 TAB=50 MCG  Indications: sunday only    Cetirizine 10 mg cap Take 10 mg by mouth daily. omeprazole (PRILOSEC) 20 mg capsule Take 20 mg by mouth daily. estradiol (ESTRACE) 0.5 mg tablet Take 0.5 mg by mouth daily. atorvastatin (LIPITOR) 40 mg tablet Take 40 mg by mouth nightly.    gabapentin (NEURONTIN) 300 mg capsule Take 300 mg by mouth two (2) times a day. levothyroxine (SYNTHROID) 100 mcg tablet Take 100 mcg by mouth Daily (before breakfast). Indications: mon through sat     No current facility-administered medications for this visit. I have reviewed the nurses notes, vitals, problem list, allergy list, medical history, family, social history and medications. REVIEW OF SYMPTOMS    As per HPI  General: Pt denies excessive weight gain or loss. Pt is able to conduct ADL's  HEENT: Denies blurred vision, headaches, hearing loss, epistaxis and difficulty swallowing. Respiratory: Denies cough, congestion, shortness of breath, JACK, wheezing or stridor.   Cardiovascular: Denies precordial pain, palpitations, edema or PND  Gastrointestinal: Denies poor appetite, indigestion, abdominal pain or blood in stool  Genitourinary: Denies hematuria, dysuria, increased urinary frequency  Musculoskeletal: Denies joint pain or swelling from muscles or joints  Neurologic: Denies tremor, paresthesias, headache, or sensory motor disturbance  Psychiatric: Denies confusion, insomnia, depression  Integumentray: Denies rash, itching or ulcers. Hematologic: Denies easy bruising, bleeding     PHYSICAL EXAMINATION      Vitals:    02/06/23 1623   BP: 120/70   Pulse: 86   SpO2: 98%   Weight: 162 lb (73.5 kg)   Height: 5' 4.5\" (1.638 m)     General: Well developed, in no acute distress. HEENT: No jaundice, oral mucosa moist, no oral ulcers  Neck: Supple, no stiffness, no lymphadenopathy, supple  Heart:  Normal S1/S2 negative S3 or S4. Regular, no murmur, gallop or rub, no jugular venous distention  Respiratory: Clear bilaterally x 4, no wheezing or rales  Extremities:  No edema, normal cap refill, no cyanosis. Musculoskeletal: No clubbing, no deformities  Neuro: A&Ox3, speech clear, gait stable, cooperative, no focal neurologic deficits  Skin: Skin color is normal. No rashes or lesions. Non diaphoretic, moist.  Abdomen:   Soft, non-tender, bowel sounds are active. EKG: Date: (2/6/2023) sinus rhythm nonspecific ST-T changes     DIAGNOSTIC DATA     No specialty comments available. LABORATORY DATA       Lab Results   Component Value Date/Time    WBC 8.0 05/28/2019 04:13 PM    HGB 13.3 05/28/2019 04:13 PM    HCT 40.4 05/28/2019 04:13 PM    PLATELET 924 54/72/8846 04:13 PM    MCV 87.1 05/28/2019 04:13 PM      Lab Results   Component Value Date/Time    Sodium 139 05/28/2019 04:13 PM    Potassium 3.0 (L) 05/28/2019 04:13 PM    Chloride 101 05/28/2019 04:13 PM    CO2 30 05/28/2019 04:13 PM    Anion gap 8 05/28/2019 04:13 PM    Glucose 94 05/28/2019 04:13 PM    BUN 11 05/28/2019 04:13 PM    Creatinine 0.89 05/28/2019 04:13 PM    BUN/Creatinine ratio 12 05/28/2019 04:13 PM    GFR est AA >60 05/28/2019 04:13 PM    GFR est non-AA >60 05/28/2019 04:13 PM    Calcium 8.8 05/28/2019 04:13 PM    Bilirubin, total 0.3 10/14/2018 07:58 PM    Alk.  phosphatase 85 10/14/2018 07:58 PM    Protein, total 7.2 10/14/2018 07:58 PM    Albumin 4.0 10/14/2018 07:58 PM    Globulin 3.2 10/14/2018 07:58 PM    A-G Ratio 1.3 10/14/2018 07:58 PM    ALT (SGPT) 50 10/14/2018 07:58 PM            ICD-10-CM ICD-9-CM   1. Palpitations  R00.2 785.1        ASSESSMENT/RECOMMENDATIONS:.      1. Palpitations/SOB  -Palpitations sound as though they may be SVT  -Gave information on vagal maneuvers  -Asked that she wear an event monitor  -Check echocardiogram to look at LV function secondary to shortness of breath  -Also do a stress echo to ensure there is no underlying ischemia  2. Dyslipidemia  -Cholesterol profile was performed at her primary care's office and we will obtain those results  3. RC  4. T2DM  -HgbA1c is 5.2   5. Follow-up with me in 6 to 8 weeks    Orders Placed This Encounter    AMB POC EKG ROUTINE W/ 12 LEADS, INTER & REP     Order Specific Question:   Reason for Exam:     Answer:   PALPITATIONS    traMADoL 300 mg TM24     Sig: Take  by mouth. naproxen (NAPROSYN) 500 mg tablet     Sig: Take 500 mg by mouth two (2) times daily (with meals). We discussed the expected course, resolution and complications of the diagnosis(es) in detail. Medication risks, benefits, costs, interactions, and alternatives were discussed as indicated. I advised him to contact the office if his condition worsens, changes or fails to improve as anticipated. He expressed understanding with the diagnosis(es) and plan              I have discussed the diagnosis with  Blaze Velez and the intended plan as seen in the above orders. Questions were answered concerning future plans. I have discussed medication side effects and warnings with the patient as well. Thank you,  Ping Ashley MD for involving me in the care of  Blaze Velez. Please do not hesitate to contact me for further questions/concerns. Jh Arizmendi MD, [address] [facility] [facility], [address] [place] [address], [facility] [address]      [phone] / [phone] Fax

## 2023-02-06 NOTE — LETTER
2/6/2023    Patient: Petey De La Vega   YOB: 1960   Date of Visit: 2/6/2023     Steve Ott MD  [address]  [facility] [address]  Via Fax: [phone]    Dear Steve Ott MD,      Thank you for referring Ms. Ana Cristina Montez to [address] for evaluation. My notes for this consultation are attached. If you have questions, please do not hesitate to call me. I look forward to following your patient along with you.       Sincerely,    Cristina Patricio MD

## 2023-02-08 ENCOUNTER — TRANSCRIBE ORDER (OUTPATIENT)
Dept: SCHEDULING | Age: 63
End: 2023-02-08

## 2023-02-08 ENCOUNTER — DOCUMENTATION ONLY (OUTPATIENT)
Dept: CARDIOLOGY CLINIC | Age: 63
End: 2023-02-08

## 2023-02-08 DIAGNOSIS — Z12.31 VISIT FOR SCREENING MAMMOGRAM: Primary | ICD-10-CM

## 2023-02-13 ENCOUNTER — HOSPITAL ENCOUNTER (OUTPATIENT)
Dept: MAMMOGRAPHY | Age: 63
Discharge: HOME | End: 2023-02-13
Attending: INTERNAL MEDICINE
Payer: COMMERCIAL

## 2023-02-13 DIAGNOSIS — Z12.31 VISIT FOR SCREENING MAMMOGRAM: ICD-10-CM

## 2023-02-13 PROCEDURE — 77063 BREAST TOMOSYNTHESIS BI: CPT

## 2023-02-16 ENCOUNTER — TRANSCRIBE ORDER (OUTPATIENT)
Dept: MAMMOGRAPHY | Age: 63
End: 2023-02-16

## 2023-02-16 DIAGNOSIS — R92.8 ABNORMALITY OF RIGHT BREAST ON SCREENING MAMMOGRAM: Primary | ICD-10-CM

## 2023-02-20 ENCOUNTER — HOSPITAL ENCOUNTER (OUTPATIENT)
Dept: MAMMOGRAPHY | Age: 63
Discharge: HOME | End: 2023-02-20
Payer: COMMERCIAL

## 2023-02-20 DIAGNOSIS — R92.8 ABNORMALITY OF RIGHT BREAST ON SCREENING MAMMOGRAM: ICD-10-CM

## 2023-02-20 PROCEDURE — 76642 ULTRASOUND BREAST LIMITED: CPT

## 2023-02-20 PROCEDURE — 77061 BREAST TOMOSYNTHESIS UNI: CPT

## 2023-03-01 ENCOUNTER — ANCILLARY PROCEDURE (OUTPATIENT)
Dept: CARDIOLOGY CLINIC | Age: 63
End: 2023-03-01
Payer: COMMERCIAL

## 2023-03-01 VITALS
WEIGHT: 162 LBS | SYSTOLIC BLOOD PRESSURE: 120 MMHG | DIASTOLIC BLOOD PRESSURE: 78 MMHG | BODY MASS INDEX: 27.66 KG/M2 | HEIGHT: 64 IN

## 2023-03-01 DIAGNOSIS — R07.89 CHEST DISCOMFORT: ICD-10-CM

## 2023-03-01 DIAGNOSIS — R00.2 PALPITATIONS: ICD-10-CM

## 2023-03-01 LAB
ECHO AO ROOT DIAM: 3.4 CM
ECHO AO ROOT INDEX: 1.9 CM/M2
ECHO AV MEAN GRADIENT: 2 MMHG
ECHO AV MEAN VELOCITY: 0.7 M/S
ECHO AV PEAK GRADIENT: 4 MMHG
ECHO AV PEAK VELOCITY: 1 M/S
ECHO AV VELOCITY RATIO: 0.9
ECHO AV VTI: 16.6 CM
ECHO LA DIAMETER INDEX: 1.51 CM/M2
ECHO LA DIAMETER: 2.7 CM
ECHO LA TO AORTIC ROOT RATIO: 0.79
ECHO LA VOL 2C: 23 ML (ref 22–52)
ECHO LA VOL 4C: 21 ML (ref 22–52)
ECHO LA VOL BP: 24 ML (ref 22–52)
ECHO LA VOL/BSA BIPLANE: 13 ML/M2 (ref 16–34)
ECHO LA VOLUME AREA LENGTH: 26 ML
ECHO LA VOLUME INDEX A2C: 13 ML/M2 (ref 16–34)
ECHO LA VOLUME INDEX A4C: 12 ML/M2 (ref 16–34)
ECHO LA VOLUME INDEX AREA LENGTH: 15 ML/M2 (ref 16–34)
ECHO LV E' LATERAL VELOCITY: 8 CM/S
ECHO LV EDV A2C: 36 ML
ECHO LV EDV A4C: 53 ML
ECHO LV EDV BP: 47 ML (ref 56–104)
ECHO LV EDV INDEX A4C: 30 ML/M2
ECHO LV EDV INDEX BP: 26 ML/M2
ECHO LV EDV NDEX A2C: 20 ML/M2
ECHO LV EJECTION FRACTION A2C: 51 %
ECHO LV EJECTION FRACTION A4C: 62 %
ECHO LV EJECTION FRACTION BIPLANE: 58 % (ref 55–100)
ECHO LV ESV A2C: 18 ML
ECHO LV ESV A4C: 20 ML
ECHO LV ESV BP: 20 ML (ref 19–49)
ECHO LV ESV INDEX A2C: 10 ML/M2
ECHO LV ESV INDEX A4C: 11 ML/M2
ECHO LV ESV INDEX BP: 11 ML/M2
ECHO LV FRACTIONAL SHORTENING: 30 % (ref 28–44)
ECHO LV INTERNAL DIMENSION DIASTOLE INDEX: 1.51 CM/M2
ECHO LV INTERNAL DIMENSION DIASTOLIC: 2.7 CM (ref 3.9–5.3)
ECHO LV INTERNAL DIMENSION SYSTOLIC INDEX: 1.06 CM/M2
ECHO LV INTERNAL DIMENSION SYSTOLIC: 1.9 CM
ECHO LV IVSD: 1 CM (ref 0.6–0.9)
ECHO LV MASS 2D: 65.2 G (ref 67–162)
ECHO LV MASS INDEX 2D: 36.4 G/M2 (ref 43–95)
ECHO LV POSTERIOR WALL DIASTOLIC: 0.9 CM (ref 0.6–0.9)
ECHO LV RELATIVE WALL THICKNESS RATIO: 0.67
ECHO LVOT AV VTI INDEX: 0.84
ECHO LVOT MEAN GRADIENT: 1 MMHG
ECHO LVOT PEAK GRADIENT: 3 MMHG
ECHO LVOT PEAK VELOCITY: 0.9 M/S
ECHO LVOT VTI: 14 CM
ECHO MV A VELOCITY: 0.8 M/S
ECHO MV AREA PHT: 3.3 CM2
ECHO MV E DECELERATION TIME (DT): 229.5 MS
ECHO MV E VELOCITY: 0.54 M/S
ECHO MV E/A RATIO: 0.68
ECHO MV E/E' LATERAL: 6.75
ECHO MV PRESSURE HALF TIME (PHT): 66.6 MS
ECHO RV FREE WALL PEAK S': 10 CM/S
ECHO RV TAPSE: 1.6 CM (ref 1.7–?)

## 2023-03-01 PROCEDURE — 93306 TTE W/DOPPLER COMPLETE: CPT | Performed by: SPECIALIST

## 2023-03-01 NOTE — PROGRESS NOTES
Your echocardiogram reveals normal heart function and this is great news.      All the best,    Marcial Way

## 2023-03-23 ENCOUNTER — APPOINTMENT (OUTPATIENT)
Dept: CARDIOLOGY CLINIC | Age: 63
End: 2023-03-23

## 2023-03-23 ENCOUNTER — ANCILLARY PROCEDURE (OUTPATIENT)
Dept: CARDIOLOGY CLINIC | Age: 63
End: 2023-03-23

## 2023-03-23 ENCOUNTER — OFFICE VISIT (OUTPATIENT)
Dept: CARDIOLOGY CLINIC | Age: 63
End: 2023-03-23

## 2023-03-23 VITALS
HEART RATE: 96 BPM | HEIGHT: 64 IN | BODY MASS INDEX: 27.66 KG/M2 | DIASTOLIC BLOOD PRESSURE: 76 MMHG | WEIGHT: 162 LBS | SYSTOLIC BLOOD PRESSURE: 132 MMHG | OXYGEN SATURATION: 93 %

## 2023-03-23 VITALS — BODY MASS INDEX: 27.66 KG/M2 | WEIGHT: 162 LBS | HEIGHT: 64 IN

## 2023-03-23 DIAGNOSIS — R00.2 PALPITATIONS: Primary | ICD-10-CM

## 2023-03-23 DIAGNOSIS — R00.2 PALPITATIONS: ICD-10-CM

## 2023-03-23 DIAGNOSIS — R07.89 CHEST DISCOMFORT: ICD-10-CM

## 2023-03-23 LAB
STRESS ANGINA INDEX: 0
STRESS BASELINE DIAS BP: 70 MMHG
STRESS BASELINE HR: 104 BPM
STRESS BASELINE ST DEPRESSION: 0 MM
STRESS BASELINE SYS BP: 128 MMHG
STRESS ESTIMATED WORKLOAD: 7 METS
STRESS EXERCISE DUR MIN: 5 MIN
STRESS EXERCISE DUR SEC: 30 SEC
STRESS O2 SAT PEAK: 99 %
STRESS O2 SAT REST: 98 %
STRESS PEAK DIAS BP: 70 MMHG
STRESS PEAK SYS BP: 182 MMHG
STRESS PERCENT HR ACHIEVED: 90 %
STRESS POST PEAK HR: 142 BPM
STRESS RATE PRESSURE PRODUCT: NORMAL BPM*MMHG
STRESS SR DUKE TREADMILL SCORE: 6
STRESS ST DEPRESSION: 0 MM
STRESS TARGET HR: 157 BPM

## 2023-03-23 RX ORDER — SEMAGLUTIDE 1.34 MG/ML
INJECTION, SOLUTION SUBCUTANEOUS
COMMUNITY
Start: 2023-03-08

## 2023-03-23 NOTE — LETTER
3/23/2023    Patient: Esther Wyatt   YOB: 1960   Date of Visit: 3/23/2023     Lexus Dubose MD  [address]  [place] [address]  Via Fax: [phone]    Dear Lexus Dubose MD,      Thank you for referring Ms. Helen Killian to [address] [place] [address] for evaluation. My notes for this consultation are attached. If you have questions, please do not hesitate to call me. I look forward to following your patient along with you.       Sincerely,    Alonso Worrell MD

## 2023-03-23 NOTE — PATIENT INSTRUCTIONS

## 2023-03-23 NOTE — PROGRESS NOTES
CARDIOLOGY OFFICE NOTE    Jh Tavares MD, [address], [place], [address] [facility]  Phone [phone]; Fax [phone]  Mobile [phone]   Voice Mail [phone]    Primary care: Ari Myers MD       ATTENTION:   This medical record was transcribed using an electronic medical records/speech recognition system. Although proofread, it may and can contain electronic, spelling and other errors. Corrections may be executed at a later time. Please feel free to contact us for any clarifications as needed. Cedric Mayers is a 61 y.o. female with  referred for palpitations          Cardiac risk factors: family history, dyslipidemia, diabetes mellitus, hypertension, post-menopausal  I have personally obtained the history from the patient. HISTORY OF PRESENTING ILLNESS    Ms./Mr. Cedric Mayers  61 y.o. is  being evaluated for palpitations and SOB. She states she is doing well and is here today to discuss her echo and stress test that she had today. Stress test was normal echo was normal.  Occasionally has palpitations and just recently turned in her heart monitor. Think the monitor was being followed by her primary care so we have to get those results.           ACTIVE PROBLEM LIST     Patient Active Problem List    Diagnosis Date Noted    Herniated cervical disc 06/04/2019    Back pain 10/14/2018    Lumbar stenosis with neurogenic claudication 09/18/2018    Unspecified essential hypertension 03/28/2014    Cervical disc disorder with radiculopathy of cervical region 01/31/2012           PAST MEDICAL HISTORY     Past Medical History:   Diagnosis Date    Arthritis     DDD    Asthma     RESCUE INHALER WHEN NEEDED    Chronic pain     CHRONIC BACK AND NECK PAIN    Diabetes ([address]     GERD (gastroesophageal reflux disease)     Hyperlipemia     Hypertension     CONTROLLED WITH MEDS    Nausea & vomiting     Osteopenia     Other ill-defined conditions(799.89)     HX: PYELONEPHRITIS AND STONES    PUD (peptic ulcer disease)     Sleep apnea     USES CPAP    Thyroid disease     HYPOTHYROID           PAST SURGICAL HISTORY     Past Surgical History:   Procedure Laterality Date    HX BREAST BIOPSY Right 2003    Stereotactic    HX BREAST BIOPSY Right 2005    Surgical excision x2    HX BREAST REDUCTION Bilateral 2004    HX GI      COLONOSCOPY/ EGD    HX GI  1980s    choleysystectomy    HX GYN  2001    hysterectomy    HX HEENT      WISDOM TEETH    HX OPEN CHOLECYSTECTOMY      HX ORTHOPAEDIC      C4, C5, L4, L5, S1 FUSIONS    AK UNLISTED NEUROLOGICAL/NEUROMUSCULAR DX PX      C4, C5, L4, L5, S1 FUSIONS    AK UNLISTED NEUROLOGICAL/NEUROMUSCULAR DX PX  09/2018    C4-C7, L3-S1    AK UNLISTED PROCEDURE BREAST  1980    BENIGN MASS RIGHT BREAST          ALLERGIES     Allergies   Allergen Reactions    Levaquin [Levofloxacin] Hives and Itching    Lidocaine Hives     LIDOCAINE INJECTED FOR SUTURE INSERTION    Promethazine Hives          FAMILY HISTORY     Family History   Problem Relation Age of Onset    Diabetes Mother         TYPE II    Hypertension Mother     Breast Cancer Mother 78    Arthritis Mother     Hypertension Father     Coronary Art Dis Father     Hypertension Brother     Diabetes Maternal Grandmother     Hypertension Maternal Grandmother     Hypertension Maternal Grandfather     Cancer Maternal Grandfather         COLON CA    Hypertension Paternal Grandmother     Hypertension Paternal Grandfather     Hypertension Brother     Hypertension Brother     Breast Cancer Other         maternal cousins    negative for cardiac disease       SOCIAL HISTORY     Social History     Socioeconomic History    Marital status: SINGLE   Tobacco Use    Smoking status: Never    Smokeless tobacco: Never   Substance and Sexual Activity    Alcohol use: Yes     Comment: RARE    Drug use: No         MEDICATIONS     Current Outpatient Medications   Medication Sig    semaglutide (Ozempic) 0.25 mg or 0.5 mg/dose (2 mg/1.5 ml) subq pen INJECT 0.5MG SUBCUTANEOUSLYEVERY 7 DAYS    traMADoL 300 mg TM24 Take  by mouth. naproxen (NAPROSYN) 500 mg tablet Take 500 mg by mouth two (2) times daily (with meals). venlafaxine-SR (EFFEXOR-XR) 150 mg capsule Take 150 mg by mouth daily. albuterol (PROVENTIL HFA, VENTOLIN HFA, PROAIR HFA) 90 mcg/actuation inhaler Take 1 Puff by inhalation every four (4) hours as needed for Wheezing. potassium chloride (KLOR-CON) 10 mEq tablet Take 10 mEq by mouth two (2) times a day. zafirlukast (ACCOLATE) 20 mg tablet Take 20 mg by mouth two (2) times a day. losartan-hydroCHLOROthiazide (HYZAAR) 100-25 mg per tablet Take 1 Tab by mouth daily. levothyroxine (SYNTHROID) 50 mcg tablet Take 50 mcg by mouth Daily (before breakfast). ON Sunday ONLY 1/2 TAB=50 MCG  Indications: sunday only    Cetirizine 10 mg cap Take 10 mg by mouth daily. omeprazole (PRILOSEC) 20 mg capsule Take 20 mg by mouth daily. estradiol (ESTRACE) 0.5 mg tablet Take 0.5 mg by mouth daily. atorvastatin (LIPITOR) 40 mg tablet Take 40 mg by mouth nightly.    gabapentin (NEURONTIN) 300 mg capsule Take 300 mg by mouth two (2) times a day. levothyroxine (SYNTHROID) 100 mcg tablet Take 100 mcg by mouth Daily (before breakfast). Indications: mon through sat     No current facility-administered medications for this visit. I have reviewed the nurses notes, vitals, problem list, allergy list, medical history, family, social history and medications. REVIEW OF SYMPTOMS    As per HPI  General: Pt denies excessive weight gain or loss. Pt is able to conduct ADL's  HEENT: Denies blurred vision, headaches, hearing loss, epistaxis and difficulty swallowing. Respiratory: Denies cough, congestion, shortness of breath, JACK, wheezing or stridor.   Cardiovascular: Denies precordial pain, palpitations, edema or PND  Gastrointestinal: Denies poor appetite, indigestion, abdominal pain or blood in stool  Genitourinary: Denies hematuria, dysuria, increased urinary frequency  Musculoskeletal: Denies joint pain or swelling from muscles or joints  Neurologic: Denies tremor, paresthesias, headache, or sensory motor disturbance  Psychiatric: Denies confusion, insomnia, depression  Integumentray: Denies rash, itching or ulcers. Hematologic: Denies easy bruising, bleeding     PHYSICAL EXAMINATION      Vitals:    03/23/23 1418   BP: 132/76   Pulse: 96   SpO2: 93%   Weight: 162 lb (73.5 kg)   Height: 5' 4\" (1.626 m)       General: Well developed, in no acute distress. HEENT: No jaundice  Neck: Supple, no stiffness  Heart:  Normal S1/S2 negative S3 or S4. Regular, no murmur, gallop or rub, no jugular venous distention  Respiratory: Clear bilaterally x 4, no wheezing or rales  Extremities:  No edema, normal cap refill, no cyanosis. Musculoskeletal: No clubbing, no deformities  Neuro: A&Ox3, speech clear, gait stable, cooperative, no focal neurologic deficits  Skin: Skin color is normal. No rashes or lesions. Non diaphoretic, moist.  Abdomen:   Soft, non-tender, bowel sounds are active. EKG: Date: (2/6/2023) sinus rhythm nonspecific ST-T changes     DIAGNOSTIC DATA     1. Echo  3/1/23-EF 55 - 60%, Tricuspid AV, Mild sclerosis AV cusp, Mild annular calcification of MV    2.  Lipids  4/29/22- , HDL 46, LDL 89,          LABORATORY DATA       Lab Results   Component Value Date/Time    WBC 8.0 05/28/2019 04:13 PM    HGB 13.3 05/28/2019 04:13 PM    HCT 40.4 05/28/2019 04:13 PM    PLATELET 362 42/49/4172 04:13 PM    MCV 87.1 05/28/2019 04:13 PM      Lab Results   Component Value Date/Time    Sodium 139 05/28/2019 04:13 PM    Potassium 3.0 (L) 05/28/2019 04:13 PM    Chloride 101 05/28/2019 04:13 PM    CO2 30 05/28/2019 04:13 PM    Anion gap 8 05/28/2019 04:13 PM    Glucose 94 05/28/2019 04:13 PM    BUN 11 05/28/2019 04:13 PM    Creatinine 0.89 05/28/2019 04:13 PM    BUN/Creatinine ratio 12 05/28/2019 04:13 PM    GFR est AA >60 05/28/2019 04:13 PM    GFR est non-AA >60 05/28/2019 04:13 PM    Calcium 8.8 05/28/2019 04:13 PM    Bilirubin, total 0.3 10/14/2018 07:58 PM    Alk. phosphatase 85 10/14/2018 07:58 PM    Protein, total 7.2 10/14/2018 07:58 PM    Albumin 4.0 10/14/2018 07:58 PM    Globulin 3.2 10/14/2018 07:58 PM    A-G Ratio 1.3 10/14/2018 07:58 PM    ALT (SGPT) 50 10/14/2018 07:58 PM            ICD-10-CM ICD-9-CM   1. Palpitations  R00.2 785.1   2. Chest discomfort  R07.89 786.59        ASSESSMENT/RECOMMENDATIONS:.      1. Palpitations/SOB  -I believe she has a loop monitor was placed by her primary care some waiting on those results  -Asked that she wear an event monitor  -Echo reveals EF 55 to 60%  -She is an excellent job on her stress echo and all imaging was good no EKG changes and normal sleep study  2. Dyslipidemia  -Cholesterols followed by her primary care was last checked in April 2022 LDL was at goal under 100 and it was 89 on Lipitor 40 mg a day  3. RC  -Did not speak with her about sleep apnea  4. T2DM  -HgbA1c is 5.2 from last office visit  5. Hypertension is on lisinopril hydrochlorothiazide  5. Follow-up with me in 6 mo. Orders Placed This Encounter    semaglutide (Ozempic) 0.25 mg or 0.5 mg/dose (2 mg/1.5 ml) subq pen     Sig: INJECT 0.5MG SUBCUTANEOUSLYEVERY 7 DAYS         We discussed the expected course, resolution and complications of the diagnosis(es) in detail. Medication risks, benefits, costs, interactions, and alternatives were discussed as indicated. I advised him to contact the office if his condition worsens, changes or fails to improve as anticipated. He expressed understanding with the diagnosis(es) and plan          Follow-up and Dispositions    Return in about 6 months (around 9/23/2023). I have discussed the diagnosis with  Tila Mcclain and the intended plan as seen in the above orders. Questions were answered concerning future plans.   I have discussed medication side effects and warnings with the patient as well. Thank you,  Vannesa Silva MD for involving me in the care of  Gualberto Lockett. Please do not hesitate to contact me for further questions/concerns. Jh Arizmendi MD, [facility] [address]      [facility], [address] [place] [address] Drive      [phone] / [phone] Fax

## 2023-03-23 NOTE — PROGRESS NOTES
Ewelina Hillman is a 61 y.o. female    Chief Complaint   Patient presents with    Follow-up     Stress echo    Shortness of Breath    Palpitations       Vitals:    03/23/23 1418   BP: 132/76   BP 1 Location: Left upper arm   BP Patient Position: Sitting   Pulse: 96   Height: 5' 4\" (1.626 m)   Weight: 162 lb (73.5 kg)   SpO2: 93%     Patient sent monitor on the 17th    Chest pain no    SOB with exertions     Dizziness on treadmill heart rate on the 140 today    Swelling no    Refills no    Covid Vaccinated yes      1. Have you been to the ER, urgent care clinic since your last visit? Hospitalized since your last visit? No    2. Have you seen or consulted any other health care providers outside of the [address] [place] [address] since your last visit? Include any pap smears or colon screening.  No

## 2023-04-20 ENCOUNTER — HOSPITAL ENCOUNTER (OUTPATIENT)
Dept: MAMMOGRAPHY | Age: 63
Discharge: HOME | End: 2023-04-20
Payer: COMMERCIAL

## 2023-04-20 DIAGNOSIS — R92.8 FOLLOW-UP EXAMINATION OF ABNORMAL MAMMOGRAM: ICD-10-CM

## 2023-04-20 PROCEDURE — 76642 ULTRASOUND BREAST LIMITED: CPT

## 2023-04-23 DIAGNOSIS — R92.8 FOLLOW-UP EXAMINATION OF ABNORMAL MAMMOGRAM: Primary | ICD-10-CM

## 2023-04-26 NOTE — ROUTINE PROCESS
Care Transitions Program Week 2 Follow-up Call    Current Issues/Problems reviewed on call: Patient has completed lung biopsy. Follow up scheduled with PCP for review    Details of Interventions/Education completed on call: Discussed access to care and who to contact for a change in condition.     Review of Systems:   Care Transition System Evaluation:    BP- Patient Reported:     Pulse - Patient Reported :     Temp - Patient Reported :    Fever: is not present   Pain:     Pain Location:    Weight-Patient Reported :    Resp - Patient Reported :    SpO2% - Patient Reported :   Peakflow - Patient Reported :    General Symptoms: Dizziness  Neurologic/Neuromuscular Symptoms: Weakness  ENT Symptoms: WDL (absence of symptoms)  Respiratory Symptoms: Shortness of breath  Shortness of Breath: SOB with mild exertion; SOB with moderate exercise    Cardiovascular Symptoms: WDL (absence of symptoms)  GI Symptoms:           Symptoms: WDL (absence of symptoms)  Skin Symptoms: WDL (absence of symptoms)     Hours of Uninterrupted Sleep:   Sleeping Behaviors:Difficulty sleeping at night  Current Lines/Drains:   Line/Drain Type:   Line/Drain Status:   Description of Line/Drain Concern:   Feeding Tube Type:    Is patient confident in appropriate use and care of line/drain:   Description of patients lack of confidence in use of line/drain:     The patient istaking all medications as prescribed. The patient did not have questions or concerns regarding the medications prescribed.    Transitional Care Management (TCM) appointment was completed.  TCM appointment plan of care and upcoming appointments were reviewed with patient.  Transportation to upcoming appointments to be provided by patient.    Next Care Transitions follow-up call will be within 1 week.    Plan for next follow-up call: Week 3 call   Patient: Fior Hines MRN: [id number]  SSN: [id number] YOB: 1960  Age: 62 y.o. Sex: female Patient is status post Procedure(s): WOUND EXPLORATION , PLACEMENT OF LUMBAR DRAIN. Surgeon(s) and Role: Chary Krishnamurthy MD - Primary Local/Dose/Irrigation:  
 
             
Peripheral IV 10/14/18 Left Forearm (Active) Site Assessment Clean, dry, & intact 10/16/2018  8:38 AM  
Phlebitis Assessment 0 10/16/2018  8:38 AM  
Infiltration Assessment 0 10/16/2018  8:38 AM  
Dressing Status Clean, dry, & intact 10/15/2018  9:00 PM  
Dressing Type Transparent 10/16/2018  8:38 AM  
Hub Color/Line Status Infusing 10/16/2018  8:38 AM  
Action Taken Open ports on tubing capped 10/16/2018  8:38 AM  
Alcohol Cap Used Yes 10/16/2018  8:38 AM  
   
Peripheral IV 10/16/18 Left Hand (Active) Drain Hermetic Lumbar Catheter Closed Tip 10/16/18 Left;Superior Other (comment) (Active) Dressing/Packing:  Wound Back-DRESSING TYPE: Topical skin adhesive/glue;4 x 4 (ioban over lumbar dressing) (10/16/18 1300) Splint/Cast:  ] Other:

## 2023-09-21 ENCOUNTER — HOSPITAL ENCOUNTER (OUTPATIENT)
Facility: HOSPITAL | Age: 63
Discharge: HOME | End: 2023-09-21
Attending: NEUROLOGICAL SURGERY
Payer: COMMERCIAL

## 2023-09-21 ENCOUNTER — HOSPITAL ENCOUNTER (OUTPATIENT)
Facility: HOSPITAL | Age: 63
End: 2023-09-21
Attending: NEUROLOGICAL SURGERY
Payer: COMMERCIAL

## 2023-09-21 DIAGNOSIS — G95.9 MYELOPATHY (HCC): ICD-10-CM

## 2023-09-21 PROCEDURE — 72146 MRI CHEST SPINE W/O DYE: CPT

## 2023-09-21 PROCEDURE — 72148 MRI LUMBAR SPINE W/O DYE: CPT

## 2023-09-21 PROCEDURE — 72141 MRI NECK SPINE W/O DYE: CPT

## 2024-09-25 ENCOUNTER — HOSPITAL ENCOUNTER (EMERGENCY)
Facility: HOSPITAL | Age: 64
Discharge: HOME | End: 2024-09-26
Attending: EMERGENCY MEDICINE
Payer: COMMERCIAL

## 2024-09-25 ENCOUNTER — APPOINTMENT (OUTPATIENT)
Facility: HOSPITAL | Age: 64
End: 2024-09-25
Payer: COMMERCIAL

## 2024-09-25 DIAGNOSIS — R11.2 NAUSEA AND VOMITING, UNSPECIFIED VOMITING TYPE: Primary | ICD-10-CM

## 2024-09-25 LAB
ALBUMIN SERPL-MCNC: 3.9 G/DL (ref 3.5–5)
ALBUMIN/GLOB SERPL: 1.1 (ref 1.1–2.2)
ALP SERPL-CCNC: 99 U/L (ref 45–117)
ALT SERPL-CCNC: 48 U/L (ref 12–78)
ANION GAP SERPL CALC-SCNC: 8 MMOL/L (ref 2–12)
AST SERPL-CCNC: 33 U/L (ref 15–37)
BASOPHILS # BLD: 0 K/UL (ref 0–0.1)
BASOPHILS NFR BLD: 0 % (ref 0–1)
BILIRUB SERPL-MCNC: 0.5 MG/DL (ref 0.2–1)
BUN SERPL-MCNC: 10 MG/DL (ref 6–20)
BUN/CREAT SERPL: 11 (ref 12–20)
CALCIUM SERPL-MCNC: 8.6 MG/DL (ref 8.5–10.1)
CHLORIDE SERPL-SCNC: 106 MMOL/L (ref 97–108)
CO2 SERPL-SCNC: 25 MMOL/L (ref 21–32)
CREAT SERPL-MCNC: 0.91 MG/DL (ref 0.55–1.02)
DIFFERENTIAL METHOD BLD: ABNORMAL
EOSINOPHIL # BLD: 0.2 K/UL (ref 0–0.4)
EOSINOPHIL NFR BLD: 1 % (ref 0–7)
ERYTHROCYTE [DISTWIDTH] IN BLOOD BY AUTOMATED COUNT: 12.8 % (ref 11.5–14.5)
GLOBULIN SER CALC-MCNC: 3.7 G/DL (ref 2–4)
GLUCOSE BLD STRIP.AUTO-MCNC: 140 MG/DL (ref 65–117)
GLUCOSE SERPL-MCNC: 152 MG/DL (ref 65–100)
HCT VFR BLD AUTO: 45.3 % (ref 35–47)
HGB BLD-MCNC: 15.5 G/DL (ref 11.5–16)
IMM GRANULOCYTES # BLD AUTO: 0 K/UL (ref 0–0.04)
IMM GRANULOCYTES NFR BLD AUTO: 0 % (ref 0–0.5)
LIPASE SERPL-CCNC: 44 U/L (ref 13–75)
LYMPHOCYTES # BLD: 0.6 K/UL (ref 0.8–3.5)
LYMPHOCYTES NFR BLD: 3 % (ref 12–49)
MAGNESIUM SERPL-MCNC: 1.8 MG/DL (ref 1.6–2.4)
MCH RBC QN AUTO: 29.2 PG (ref 26–34)
MCHC RBC AUTO-ENTMCNC: 34.2 G/DL (ref 30–36.5)
MCV RBC AUTO: 85.5 FL (ref 80–99)
MONOCYTES # BLD: 1 K/UL (ref 0–1)
MONOCYTES NFR BLD: 5 % (ref 5–13)
NEUTS BAND NFR BLD MANUAL: 10 %
NEUTS SEG # BLD: 18.7 K/UL (ref 1.8–8)
NEUTS SEG NFR BLD: 81 % (ref 32–75)
NRBC # BLD: 0 K/UL (ref 0–0.01)
NRBC BLD-RTO: 0 PER 100 WBC
PLATELET # BLD AUTO: 398 K/UL (ref 150–400)
PMV BLD AUTO: 9.8 FL (ref 8.9–12.9)
POTASSIUM SERPL-SCNC: 3.3 MMOL/L (ref 3.5–5.1)
PROT SERPL-MCNC: 7.6 G/DL (ref 6.4–8.2)
RBC # BLD AUTO: 5.3 M/UL (ref 3.8–5.2)
RBC MORPH BLD: ABNORMAL
SERVICE CMNT-IMP: ABNORMAL
SODIUM SERPL-SCNC: 139 MMOL/L (ref 136–145)
TROPONIN I SERPL HS-MCNC: <4 NG/L (ref 0–51)
WBC # BLD AUTO: 20.5 K/UL (ref 3.6–11)
WBC MORPH BLD: ABNORMAL

## 2024-09-25 PROCEDURE — 6360000004 HC RX CONTRAST MEDICATION: Performed by: RADIOLOGY

## 2024-09-25 PROCEDURE — 2580000003 HC RX 258: Performed by: EMERGENCY MEDICINE

## 2024-09-25 PROCEDURE — 82962 GLUCOSE BLOOD TEST: CPT

## 2024-09-25 PROCEDURE — 85025 COMPLETE CBC W/AUTO DIFF WBC: CPT

## 2024-09-25 PROCEDURE — 80053 COMPREHEN METABOLIC PANEL: CPT

## 2024-09-25 PROCEDURE — 83735 ASSAY OF MAGNESIUM: CPT

## 2024-09-25 PROCEDURE — 99285 EMERGENCY DEPT VISIT HI MDM: CPT

## 2024-09-25 PROCEDURE — 74177 CT ABD & PELVIS W/CONTRAST: CPT

## 2024-09-25 PROCEDURE — 83690 ASSAY OF LIPASE: CPT

## 2024-09-25 PROCEDURE — 84484 ASSAY OF TROPONIN QUANT: CPT

## 2024-09-25 PROCEDURE — 6360000002 HC RX W HCPCS: Performed by: EMERGENCY MEDICINE

## 2024-09-25 PROCEDURE — 93005 ELECTROCARDIOGRAM TRACING: CPT | Performed by: EMERGENCY MEDICINE

## 2024-09-25 PROCEDURE — 71045 X-RAY EXAM CHEST 1 VIEW: CPT

## 2024-09-25 PROCEDURE — 96374 THER/PROPH/DIAG INJ IV PUSH: CPT

## 2024-09-25 PROCEDURE — 96361 HYDRATE IV INFUSION ADD-ON: CPT

## 2024-09-25 PROCEDURE — 36415 COLL VENOUS BLD VENIPUNCTURE: CPT

## 2024-09-25 RX ORDER — ONDANSETRON 2 MG/ML
4 INJECTION INTRAMUSCULAR; INTRAVENOUS
Status: COMPLETED | OUTPATIENT
Start: 2024-09-25 | End: 2024-09-25

## 2024-09-25 RX ORDER — IOPAMIDOL 755 MG/ML
100 INJECTION, SOLUTION INTRAVASCULAR
Status: COMPLETED | OUTPATIENT
Start: 2024-09-25 | End: 2024-09-25

## 2024-09-25 RX ORDER — MONTELUKAST SODIUM 10 MG/1
10 TABLET ORAL NIGHTLY
COMMUNITY

## 2024-09-25 RX ORDER — VALSARTAN 160 MG/1
160 TABLET ORAL DAILY
COMMUNITY

## 2024-09-25 RX ORDER — FLUTICASONE PROPIONATE AND SALMETEROL 100; 50 UG/1; UG/1
1 POWDER RESPIRATORY (INHALATION) EVERY 12 HOURS
COMMUNITY

## 2024-09-25 RX ORDER — 0.9 % SODIUM CHLORIDE 0.9 %
1000 INTRAVENOUS SOLUTION INTRAVENOUS
Status: COMPLETED | OUTPATIENT
Start: 2024-09-25 | End: 2024-09-26

## 2024-09-25 RX ADMIN — SODIUM CHLORIDE 1000 ML: 9 INJECTION, SOLUTION INTRAVENOUS at 22:34

## 2024-09-25 RX ADMIN — ONDANSETRON 4 MG: 2 INJECTION INTRAMUSCULAR; INTRAVENOUS at 22:34

## 2024-09-25 RX ADMIN — IOPAMIDOL 100 ML: 755 INJECTION, SOLUTION INTRAVENOUS at 23:48

## 2024-09-25 ASSESSMENT — PAIN - FUNCTIONAL ASSESSMENT: PAIN_FUNCTIONAL_ASSESSMENT: ACTIVITIES ARE NOT PREVENTED

## 2024-09-25 ASSESSMENT — PAIN DESCRIPTION - DESCRIPTORS: DESCRIPTORS: CRAMPING

## 2024-09-25 ASSESSMENT — PAIN DESCRIPTION - PAIN TYPE: TYPE: ACUTE PAIN

## 2024-09-25 ASSESSMENT — PAIN DESCRIPTION - ORIENTATION: ORIENTATION: MID;LEFT;UPPER

## 2024-09-25 ASSESSMENT — PAIN DESCRIPTION - ONSET: ONSET: SUDDEN

## 2024-09-25 ASSESSMENT — PAIN SCALES - GENERAL: PAINLEVEL_OUTOF10: 8

## 2024-09-25 ASSESSMENT — PAIN DESCRIPTION - LOCATION: LOCATION: ABDOMEN

## 2024-09-26 VITALS
SYSTOLIC BLOOD PRESSURE: 166 MMHG | WEIGHT: 152 LBS | OXYGEN SATURATION: 97 % | RESPIRATION RATE: 18 BRPM | DIASTOLIC BLOOD PRESSURE: 72 MMHG | TEMPERATURE: 97.9 F | HEIGHT: 64 IN | HEART RATE: 88 BPM | BODY MASS INDEX: 25.95 KG/M2

## 2024-09-26 LAB
EKG ATRIAL RATE: 101 BPM
EKG DIAGNOSIS: NORMAL
EKG P AXIS: 70 DEGREES
EKG P-R INTERVAL: 146 MS
EKG Q-T INTERVAL: 356 MS
EKG QRS DURATION: 84 MS
EKG QTC CALCULATION (BAZETT): 461 MS
EKG R AXIS: 62 DEGREES
EKG T AXIS: 82 DEGREES
EKG VENTRICULAR RATE: 101 BPM

## 2024-09-26 PROCEDURE — 93010 ELECTROCARDIOGRAM REPORT: CPT | Performed by: INTERNAL MEDICINE

## 2024-09-26 RX ORDER — ONDANSETRON 4 MG/1
4 TABLET, ORALLY DISINTEGRATING ORAL EVERY 8 HOURS PRN
Qty: 10 TABLET | Refills: 0 | Status: SHIPPED | OUTPATIENT
Start: 2024-09-26

## 2025-05-02 ENCOUNTER — TRANSCRIBE ORDERS (OUTPATIENT)
Facility: HOSPITAL | Age: 65
End: 2025-05-02

## 2025-05-02 DIAGNOSIS — Z12.31 OTHER SCREENING MAMMOGRAM: Primary | ICD-10-CM

## 2025-05-05 ENCOUNTER — HOSPITAL ENCOUNTER (OUTPATIENT)
Facility: HOSPITAL | Age: 65
Discharge: HOME | End: 2025-05-08
Payer: MEDICARE

## 2025-05-05 VITALS — WEIGHT: 149 LBS | HEIGHT: 64 IN | BODY MASS INDEX: 25.44 KG/M2

## 2025-05-05 DIAGNOSIS — Z12.31 OTHER SCREENING MAMMOGRAM: ICD-10-CM

## 2025-05-05 PROCEDURE — 77063 BREAST TOMOSYNTHESIS BI: CPT

## (undated) DEVICE — LAMINECTOMY RICHMOND-LF: Brand: MEDLINE INDUSTRIES, INC.

## (undated) DEVICE — DRAPE SURG W41XL74IN CLR FULL SZ C ARM 3 ADH POLY STRP E

## (undated) DEVICE — DRAPE C-ARMOUR C-ARM KIT --

## (undated) DEVICE — KENDALL SCD EXPRESS SLEEVES, KNEE LENGTH, MEDIUM: Brand: KENDALL SCD

## (undated) DEVICE — INTENDED FOR TISSUE SEPARATION, AND OTHER PROCEDURES THAT REQUIRE A SHARP SURGICAL BLADE TO PUNCTURE OR CUT.: Brand: BARD-PARKER ® CARBON RIB-BACK BLADES

## (undated) DEVICE — STERILE POLYISOPRENE POWDER-FREE SURGICAL GLOVES WITH EMOLLIENT COATING: Brand: PROTEXIS

## (undated) DEVICE — PREP SKN PREVAIL 40ML APPL --

## (undated) DEVICE — BONE WAX WHITE: Brand: BONE WAX WHITE

## (undated) DEVICE — ASTOUND STANDARD SURGICAL GOWN, XXL: Brand: CONVERTORS

## (undated) DEVICE — MEDI-VAC NON-CONDUCTIVE SUCTION TUBING: Brand: CARDINAL HEALTH

## (undated) DEVICE — SOLUTION IV 1000ML 0.9% SOD CHL

## (undated) DEVICE — DRAPE XR C ARM 41X74IN LF --

## (undated) DEVICE — HERMETIC™ LUMBAR CATHETER CLOSED TIP: Brand: HERMETIC™

## (undated) DEVICE — CODMAN® SURGICAL PATTIES 1/2" X 1/2" (1.27CM X 1.27CM): Brand: CODMAN®

## (undated) DEVICE — DEVON™ KNEE AND BODY STRAP 60" X 3" (1.5 M X 7.6 CM): Brand: DEVON

## (undated) DEVICE — BIPOLAR IRRIGATOR INTEGRATED TUBING AND BIPOLAR CORD SET, DISPOSABLE

## (undated) DEVICE — SUTURE MCRYL SZ 4-0 L27IN ABSRB UD L19MM PS-2 1/2 CIR PRIM Y426H

## (undated) DEVICE — PILLOW POS AD L7IN R FOAM HD REST INTUB SLOT DISP

## (undated) DEVICE — TRAY CATH OD16FR SIL URIN M STATLOK STBL DEV SURSTP

## (undated) DEVICE — TELFA ADHESIVE ISLAND DRESSING: Brand: TELFA

## (undated) DEVICE — NEEDLE SPNL 20GA L3.5IN YEL HUB S STL REG WALL FIT STYL W/

## (undated) DEVICE — CANNULA INJ L2.5IN BLNT TIP 3MM CLR BODY W/ 1 W VLV DLP

## (undated) DEVICE — SCREW EXT FIX L14MM FOR DISTRCTN

## (undated) DEVICE — INSULATED BLADE ELECTRODE: Brand: EDGE

## (undated) DEVICE — INFECTION CONTROL KIT SYS

## (undated) DEVICE — HANDLE LT SNAP ON ULT DURABLE LENS FOR TRUMPF ALC DISPOSABLE

## (undated) DEVICE — GAUZE SPONGES,12 PLY: Brand: CURITY

## (undated) DEVICE — SUTURE NRLN SZ 4-0 L18IN NONABSORBABLE BLK L13MM TF 1/2 CIR C584D

## (undated) DEVICE — DRAIN KT WND 10FR RND 400ML --

## (undated) DEVICE — MICROMYST APPLICATOR (14CM) 5 PACK - FOR USE WITH FLOW REGULATOR: Brand: MICROMYST

## (undated) DEVICE — 1200 GUARD II KIT W/5MM TUBE W/O VAC TUBE: Brand: GUARDIAN

## (undated) DEVICE — FLOSEAL MATRIX IS INDICATED IN SURGICAL PROCEDURES (OTHER THAN IN OPHTHALMIC) AS AN ADJUNCT TO HEMOSTASIS WHEN CONTROL OF BLEEDING BY LIGATURE OR CONVENTIONALPROCEDURES IS INEFFECTIVE OR IMPRACTICAL.: Brand: FLOSEAL HEMOSTATIC MATRIX

## (undated) DEVICE — SUTURE VCRL SZ 0 L18IN ABSRB VLT L36MM CT-1 1/2 CIR J740D

## (undated) DEVICE — THE MILL DISPOSABLE - MEDIUM

## (undated) DEVICE — DRAPE,REIN 53X77,STERILE: Brand: MEDLINE

## (undated) DEVICE — TOOL 14MH30 LEGEND 14CM 3MM: Brand: MIDAS REX ™

## (undated) DEVICE — DRAPE MICSCP W46XL120IN POLY DRAWSTRAP W STEREO OBS TB AND

## (undated) DEVICE — X-RAY SPONGES,16 PLY: Brand: DERMACEA

## (undated) DEVICE — SOLUTION IV 250ML 0.9% SOD CHL CLR INJ FLX BG CONT PRT CLSR

## (undated) DEVICE — SYRINGE MED 20ML STD CLR PLAS LUERLOCK TIP N CTRL DISP

## (undated) DEVICE — DURASEAL® DURAL SEALANT SYSTEM 5ML 5 PACK: Brand: DURASEAL®

## (undated) DEVICE — DRAPE,LAPAROTOMY,PED,STERILE: Brand: MEDLINE

## (undated) DEVICE — MASTISOL ADHESIVE LIQ 2/3ML

## (undated) DEVICE — IMPLANTABLE DEVICE
Type: IMPLANTABLE DEVICE | Site: SPINE LUMBAR | Status: NON-FUNCTIONAL
Removed: 2018-09-18

## (undated) DEVICE — COVER,TABLE,HEAVY DUTY,60"X90",STRL: Brand: MEDLINE

## (undated) DEVICE — GOWN,SLEEVE,STERILE,W/CSR WRAP,1/P: Brand: MEDLINE

## (undated) DEVICE — SUTURE VCRL SZ 2-0 L18IN ABSRB UD L26MM CP-2 1/2 CIR REV J762D

## (undated) DEVICE — NEEDLE HYPO 22GA L1.5IN BLK POLYPR HUB S STL REG BVL STR

## (undated) DEVICE — FLOSEAL HEMOSTATIC MATRIX, 10 ML: Brand: FLOSEAL

## (undated) DEVICE — SUTURE VCRL SZ 3-0 L18IN ABSRB UD CP-2 L26MM 1/2 CIR REV J761D

## (undated) DEVICE — SPONGE: SPECIALTY PEANUT XR 100/CS: Brand: MEDICAL ACTION INDUSTRIES

## (undated) DEVICE — STRAP,POSITIONING,KNEE/BODY,FOAM,4X60": Brand: MEDLINE